# Patient Record
Sex: FEMALE | Race: WHITE | NOT HISPANIC OR LATINO | Employment: PART TIME | URBAN - NONMETROPOLITAN AREA
[De-identification: names, ages, dates, MRNs, and addresses within clinical notes are randomized per-mention and may not be internally consistent; named-entity substitution may affect disease eponyms.]

---

## 2017-01-09 DIAGNOSIS — Z12.31 VISIT FOR SCREENING MAMMOGRAM: Primary | ICD-10-CM

## 2017-02-10 ENCOUNTER — OFFICE VISIT (OUTPATIENT)
Dept: FAMILY MEDICINE | Facility: OTHER | Age: 62
End: 2017-02-10
Attending: PHYSICIAN ASSISTANT
Payer: COMMERCIAL

## 2017-02-10 VITALS
TEMPERATURE: 98.4 F | HEIGHT: 65 IN | SYSTOLIC BLOOD PRESSURE: 108 MMHG | OXYGEN SATURATION: 98 % | DIASTOLIC BLOOD PRESSURE: 66 MMHG | BODY MASS INDEX: 25.66 KG/M2 | WEIGHT: 154 LBS | RESPIRATION RATE: 16 BRPM | HEART RATE: 90 BPM

## 2017-02-10 DIAGNOSIS — N95.2 ATROPHIC VAGINITIS: ICD-10-CM

## 2017-02-10 DIAGNOSIS — Z23 NEED FOR SHINGLES VACCINE: ICD-10-CM

## 2017-02-10 DIAGNOSIS — Z12.31 ENCOUNTER FOR SCREENING MAMMOGRAM FOR BREAST CANCER: ICD-10-CM

## 2017-02-10 DIAGNOSIS — F51.01 PRIMARY INSOMNIA: ICD-10-CM

## 2017-02-10 DIAGNOSIS — Z23 NEED FOR PROPHYLACTIC VACCINATION AND INOCULATION AGAINST INFLUENZA: ICD-10-CM

## 2017-02-10 DIAGNOSIS — Z71.89 ACP (ADVANCE CARE PLANNING): Chronic | ICD-10-CM

## 2017-02-10 DIAGNOSIS — Z01.419 WELL WOMAN EXAM: Primary | ICD-10-CM

## 2017-02-10 PROCEDURE — 90686 IIV4 VACC NO PRSV 0.5 ML IM: CPT | Performed by: PHYSICIAN ASSISTANT

## 2017-02-10 PROCEDURE — 99396 PREV VISIT EST AGE 40-64: CPT | Mod: 25 | Performed by: PHYSICIAN ASSISTANT

## 2017-02-10 PROCEDURE — 90471 IMMUNIZATION ADMIN: CPT | Performed by: PHYSICIAN ASSISTANT

## 2017-02-10 RX ORDER — AMITRIPTYLINE HYDROCHLORIDE 10 MG/1
TABLET ORAL
Qty: 90 TABLET | Refills: 0 | Status: ON HOLD | OUTPATIENT
Start: 2017-02-10 | End: 2017-05-15

## 2017-02-10 RX ORDER — ESTRADIOL 10 UG/1
10 INSERT VAGINAL
Qty: 8 TABLET | Refills: 12 | Status: SHIPPED | OUTPATIENT
Start: 2017-02-13 | End: 2018-09-27

## 2017-02-10 ASSESSMENT — PATIENT HEALTH QUESTIONNAIRE - PHQ9: 5. POOR APPETITE OR OVEREATING: SEVERAL DAYS

## 2017-02-10 ASSESSMENT — ANXIETY QUESTIONNAIRES
7. FEELING AFRAID AS IF SOMETHING AWFUL MIGHT HAPPEN: NOT AT ALL
6. BECOMING EASILY ANNOYED OR IRRITABLE: NOT AT ALL
IF YOU CHECKED OFF ANY PROBLEMS ON THIS QUESTIONNAIRE, HOW DIFFICULT HAVE THESE PROBLEMS MADE IT FOR YOU TO DO YOUR WORK, TAKE CARE OF THINGS AT HOME, OR GET ALONG WITH OTHER PEOPLE: NOT DIFFICULT AT ALL
2. NOT BEING ABLE TO STOP OR CONTROL WORRYING: SEVERAL DAYS
GAD7 TOTAL SCORE: 4
5. BEING SO RESTLESS THAT IT IS HARD TO SIT STILL: NOT AT ALL
1. FEELING NERVOUS, ANXIOUS, OR ON EDGE: SEVERAL DAYS
3. WORRYING TOO MUCH ABOUT DIFFERENT THINGS: SEVERAL DAYS

## 2017-02-10 ASSESSMENT — PAIN SCALES - GENERAL: PAINLEVEL: NO PAIN (0)

## 2017-02-10 NOTE — MR AVS SNAPSHOT
After Visit Summary   2/10/2017    Laney Guzman    MRN: [id number]           Patient Information     Date Of Birth          1955        Visit Information        Provider Department      2/10/2017 2:15 PM Lazara Tate PA [facility] [place]        Today's Diagnoses     Well woman exam    -  1     ACP (advance care planning)         Need for prophylactic vaccination and inoculation against influenza         Primary insomnia         Atrophic vaginitis         Need for shingles vaccine         Encounter for screening mammogram for breast cancer            Follow-ups after your visit        Future tests that were ordered for you today     Open Future Orders        Priority Expected Expires Ordered    Lipid Profile Routine  2/10/2018 2/10/2017    TSH Routine  2/10/2018 2/10/2017            Who to contact     If you have questions or need follow up information about today's clinic visit or your schedule please contact [facility] [place] directly at [phone].  Normal or non-critical lab and imaging results will be communicated to you by MyChart, letter or phone within 4 business days after the clinic has received the results. If you do not hear from us within 7 days, please contact the clinic through [employer] or phone. If you have a critical or abnormal lab result, we will notify you by phone as soon as possible.  Submit refill requests through [employer] or call your pharmacy and they will forward the refill request to us. Please allow 3 business days for your refill to be completed.          Additional Information About Your Visit        MyChart Information     [employer] gives you secure access to your electronic health record. If you see a primary care provider, you can also send messages to your care team and make appointments. If you have questions, please call your primary care clinic.  If you do not have a primary care provider, please call [phone] and they will assist  "you.        Care EveryWhere ID     This is your Care EveryWhere ID. This could be used by other organizations to access your [place] medical records  [id number]        Your Vitals Were     Pulse Temperature Respirations Height BMI (Body Mass Index) Pulse Oximetry    90 98.4  F (36.9  C) (Tympanic) 16 5' 4.5\" (1.638 m) 26.04 kg/m2 98%       Blood Pressure from Last 3 Encounters:   02/10/17 108/66   09/26/16 116/68   02/11/16 117/70    Weight from Last 3 Encounters:   02/10/17 154 lb (69.854 kg)   09/26/16 143 lb (64.864 kg)   02/11/16 142 lb (64.411 kg)              We Performed the Following     HC FLU VAC PRESRV FREE QUAD SPLIT VIR 3+YRS IM     MA Screening Digital Bilateral     Vaccine Administration, Initial [73467]          Today's Medication Changes          These changes are accurate as of: 2/10/17  2:40 PM.  If you have any questions, ask your nurse or doctor.               Start taking these medicines.        Dose/Directions    amitriptyline 10 MG tablet   Commonly known as:  ELAVIL   Used for:  Primary insomnia   Started by:  Lazara Tate PA        One by mouth at bedtime   Quantity:  90 tablet   Refills:  0       zoster vaccine live (PF) injection   Commonly known as:  ZOSTAVAX   Used for:  Need for shingles vaccine   Started by:  Lazara Tate PA        Dose:  1 each   Inject 0.65 mLs Subcutaneous once for 1 dose   Quantity:  0.65 mL   Refills:  0            Where to get your medicines      These medications were sent to [facility]  [address]  [address] [place] [address]     Phone:  [phone]    - amitriptyline 10 MG tablet  - estradiol 10 MCG Tabs vaginal tablet      Some of these will need a paper prescription and others can be bought over the counter.  Ask your nurse if you have questions.     Bring a paper prescription for each of these medications    - zoster vaccine live (PF) injection             Primary Care Provider Office Phone # Fax # "    EZEQUIEL Denson [phone]       [facility] [address]  [place] [address]        Thank you!     [facility]. Our goal is always to provide you with excellent care. Hearing back from our patients is one way we can continue to improve our services. Please take a few minutes to complete the written survey that you may receive in the mail after your visit with us. Thank you!             Your Updated Medication List - Protect others around you: Learn how to safely use, store and throw away your medicines at www.disposemymeds.org.          This list is accurate as of: 2/10/17  2:40 PM.  Always use your most recent med list.                   Brand Name Dispense Instructions for use    amitriptyline 10 MG tablet    ELAVIL    90 tablet    One by mouth at bedtime       CRANBERRY      Take 1 by mouth daily       DAILY MULTIVITAMIN PO      Take 1 tablet by oral route every day with food       diphenoxylate-atropine 2.5-0.025 MG per tablet    LOMOTIL    30 tablet    Take 2 tablets by mouth 4 times daily as needed for diarrhea       estradiol 10 MCG Tabs vaginal tablet   Start taking on:  2/13/2017    VAGIFEM    8 tablet    Place 1 tablet (10 mcg) vaginally twice a week Insert 1 tablet (10mcg) by vaginal route 2 times every week       fish oil-omega-3 fatty acids 1000 MG capsule      Take 1 by oral route every day       ginger root 550 MG Caps capsule      Take 550 mg by mouth daily       IMODIUM A-D PO      Take by mouth as needed       L-FORMULA LYSINE HCL PO      Take 1 tablet by mouth daily       UNABLE TO FIND      Take 1 tablet by mouth daily MEDICATION NAME: tumeric       UNKNOWN TO PATIENT      Eye drops for Glaucoma. Name unknown. 1 drop to each eye daily       VITAMIN D PO      Take 1 tablet by mouth daily       zoster vaccine live (PF) injection    ZOSTAVAX    0.65 mL    Inject 0.65 mLs Subcutaneous once for 1 dose

## 2017-02-11 ASSESSMENT — PATIENT HEALTH QUESTIONNAIRE - PHQ9: SUM OF ALL RESPONSES TO PHQ QUESTIONS 1-9: 0

## 2017-02-11 ASSESSMENT — ANXIETY QUESTIONNAIRES: GAD7 TOTAL SCORE: 4

## 2017-02-13 ENCOUNTER — ALLIED HEALTH/NURSE VISIT (OUTPATIENT)
Dept: FAMILY MEDICINE | Facility: OTHER | Age: 62
End: 2017-02-13
Attending: PHYSICIAN ASSISTANT
Payer: COMMERCIAL

## 2017-02-13 DIAGNOSIS — Z01.419 WELL WOMAN EXAM: ICD-10-CM

## 2017-02-13 DIAGNOSIS — Z23 NEED FOR SHINGLES VACCINE: Primary | ICD-10-CM

## 2017-02-13 LAB
CHOLEST SERPL-MCNC: 216 MG/DL
HDLC SERPL-MCNC: 82 MG/DL
LDLC SERPL CALC-MCNC: 108 MG/DL
NONHDLC SERPL-MCNC: 134 MG/DL
TRIGL SERPL-MCNC: 131 MG/DL
TSH SERPL DL<=0.05 MIU/L-ACNC: 2.61 MU/L (ref 0.4–4)

## 2017-02-13 PROCEDURE — 84443 ASSAY THYROID STIM HORMONE: CPT | Performed by: PHYSICIAN ASSISTANT

## 2017-02-13 PROCEDURE — 80061 LIPID PANEL: CPT | Performed by: PHYSICIAN ASSISTANT

## 2017-02-13 PROCEDURE — 36415 COLL VENOUS BLD VENIPUNCTURE: CPT | Performed by: PHYSICIAN ASSISTANT

## 2017-02-13 PROCEDURE — 90471 IMMUNIZATION ADMIN: CPT

## 2017-02-13 NOTE — NURSING NOTE
The following medication was given:     MEDICATION: Zostavax, patient brings own medication from the pharmacy.  ROUTE: SQ  SITE: Arm - Left  DOSE: 0.5 ml  LOT #: [id number]  :  [employer], [employer], [employer]  EXPIRATION DATE:  11/07/2017  Marely Serrano

## 2017-02-13 NOTE — MR AVS SNAPSHOT
After Visit Summary   2/13/2017    Laney Guzman    MRN: [id number]           Patient Information     Date Of Birth          1955        Visit Information        Provider Department      2/13/2017 3:15 PM HC FP NURSE [facility] [place]        Today's Diagnoses     Need for shingles vaccine    -  1       Follow-ups after your visit        Your next 10 appointments already scheduled     Feb 13, 2017  3:15 PM CST   (Arrive by 3:00 PM)   Nurse Only with HC FP NURSE   [facility] [place] ([facility] [place] [facility])    [address]  [place] [address]   [phone]            Feb 20, 2017  1:00 PM CST   MAMMOGRAM with HC MAMMOGRAM RM   [facility] [place] ([facility] [place] [facility])    [address]  [place] [address]   [phone]           Do not wear any body powder, lotions, deodorant or perfume the day of the exam. Bring a list of all medications, especially hormones.  If your last mammogram was not done at [place], please bring your mammogram films. We will need the name of your MD/PA to send a copy of your report.              Who to contact     If you have questions or need follow up information about today's clinic visit or your schedule please contact [facility] directly at [phone].  Normal or non-critical lab and imaging results will be communicated to you by B[employer], letter or phone within 4 business days after the clinic has received the results. If you do not hear from us within 7 days, please contact the clinic through [employer] or phone. If you have a critical or abnormal lab result, we will notify you by phone as soon as possible.  Submit refill requests through [employer] or call your pharmacy and they will forward the refill request to us. Please allow 3 business days for your refill to be completed.          Additional Information About Your Visit        [employer] Information     [employer] gives you secure access to your electronic health record. If you  see a primary care provider, you can also send messages to your care team and make appointments. If you have questions, please call your primary care clinic.  If you do not have a primary care provider, please call [phone] and they will assist you.        Care EveryWhere ID     This is your Care EveryWhere ID. This could be used by other organizations to access your [place] medical records  [id number]         Blood Pressure from Last 3 Encounters:   02/10/17 108/66   09/26/16 116/68   02/11/16 117/70    Weight from Last 3 Encounters:   02/10/17 154 lb (69.9 kg)   09/26/16 143 lb (64.9 kg)   02/11/16 142 lb (64.4 kg)              We Performed the Following     ADMIN 1st VACCINE        Primary Care Provider Office Phone # Fax #    EZEQUIEL Denson [phone]       [facility] [address] [facility] [address]  [facility] [address]        Thank you!     Thank you for choosing [facility]  for your care. Our goal is always to provide you with excellent care. Hearing back from our patients is one way we can continue to improve our services. Please take a few minutes to complete the written survey that you may receive in the mail after your visit with us. Thank you!             Your Updated Medication List - Protect others around you: Learn how to safely use, store and throw away your medicines at www.disposemymeds.org.          This list is accurate as of: 2/13/17  2:55 PM.  Always use your most recent med list.                   Brand Name Dispense Instructions for use    amitriptyline 10 MG tablet    ELAVIL    90 tablet    One by mouth at bedtime       CRANBERRY      Take 1 by mouth daily       DAILY MULTIVITAMIN PO      Take 1 tablet by oral route every day with food       diphenoxylate-atropine 2.5-0.025 MG per tablet    LOMOTIL    30 tablet    Take 2 tablets by mouth 4 times daily as needed for diarrhea       estradiol 10 MCG Tabs vaginal tablet    VAGIFEM    8 tablet    Place 1  tablet (10 mcg) vaginally twice a week Insert 1 tablet (10mcg) by vaginal route 2 times every week       fish oil-omega-3 fatty acids 1000 MG capsule      Take 1 by oral route every day       ginger root 550 MG Caps capsule      Take 550 mg by mouth daily       IMODIUM A-D PO      Take by mouth as needed       L-FORMULA LYSINE HCL PO      Take 1 tablet by mouth daily       UNABLE TO FIND      Take 1 tablet by mouth daily MEDICATION NAME: tumeric       UNKNOWN TO PATIENT      Eye drops for Glaucoma. Name unknown. 1 drop to each eye daily       VITAMIN D PO      Take 1 tablet by mouth daily

## 2017-02-13 NOTE — PROGRESS NOTES
The following medication was given:     MEDICATION: Zostavax, patient brings own medication from the pharmacy.  ROUTE: SQ  SITE: Arm - Left  DOSE: 0.5 ml  LOT #: [id number]  :  [employer], [employer], [employer]  EXPIRATION DATE:  11/07/2017  Marely Serrano

## 2017-02-22 DIAGNOSIS — Z12.31 VISIT FOR SCREENING MAMMOGRAM: Primary | ICD-10-CM

## 2017-02-22 PROCEDURE — 77063 BREAST TOMOSYNTHESIS BI: CPT | Mod: TC | Performed by: RADIOLOGY

## 2017-02-22 PROCEDURE — G0202 SCR MAMMO BI INCL CAD: HCPCS | Mod: TC | Performed by: RADIOLOGY

## 2017-02-23 ENCOUNTER — TELEPHONE (OUTPATIENT)
Dept: FAMILY MEDICINE | Facility: OTHER | Age: 62
End: 2017-02-23

## 2017-02-23 NOTE — TELEPHONE ENCOUNTER
8:26 AM    Reason for Call: Phone Call    Description: Laney had a mammogram yesterday and was told that tyeeds to get the results from her nurse. There was some type of problem. Please call Laney to advise.    Was an appointment offered for this call?  No    Preferred method for responding to this message: [phone] or [phone]    If we cannot reach you directly, may we leave a detailed response at the number you provided? yes        Martha Fong

## 2017-05-15 ENCOUNTER — ANESTHESIA (OUTPATIENT)
Dept: SURGERY | Facility: HOSPITAL | Age: 62
End: 2017-05-15
Payer: COMMERCIAL

## 2017-05-15 ENCOUNTER — TELEPHONE (OUTPATIENT)
Dept: FAMILY MEDICINE | Facility: OTHER | Age: 62
End: 2017-05-15

## 2017-05-15 ENCOUNTER — ANESTHESIA EVENT (OUTPATIENT)
Dept: SURGERY | Facility: HOSPITAL | Age: 62
End: 2017-05-15
Payer: COMMERCIAL

## 2017-05-15 ENCOUNTER — SURGERY (OUTPATIENT)
Age: 62
End: 2017-05-15

## 2017-05-15 ENCOUNTER — HOSPITAL ENCOUNTER (OUTPATIENT)
Facility: HOSPITAL | Age: 62
Discharge: HOME | End: 2017-05-15
Attending: INTERNAL MEDICINE | Admitting: INTERNAL MEDICINE
Payer: COMMERCIAL

## 2017-05-15 VITALS
OXYGEN SATURATION: 97 % | SYSTOLIC BLOOD PRESSURE: 144 MMHG | DIASTOLIC BLOOD PRESSURE: 96 MMHG | HEART RATE: 112 BPM | HEIGHT: 67 IN | BODY MASS INDEX: 21.5 KG/M2 | WEIGHT: 137 LBS | RESPIRATION RATE: 16 BRPM | TEMPERATURE: 97.5 F

## 2017-05-15 DIAGNOSIS — F51.01 PRIMARY INSOMNIA: ICD-10-CM

## 2017-05-15 PROCEDURE — 37000008 ZZH ANESTHESIA TECHNICAL FEE, 1ST 30 MIN: Performed by: INTERNAL MEDICINE

## 2017-05-15 PROCEDURE — 25000125 ZZHC RX 250: Performed by: NURSE ANESTHETIST, CERTIFIED REGISTERED

## 2017-05-15 PROCEDURE — 40000306 ZZH STATISTIC PRE PROC ASSESS II: Performed by: INTERNAL MEDICINE

## 2017-05-15 PROCEDURE — 27210794 ZZH OR GENERAL SUPPLY STERILE: Performed by: INTERNAL MEDICINE

## 2017-05-15 PROCEDURE — 71000027 ZZH RECOVERY PHASE 2 EACH 15 MINS: Performed by: INTERNAL MEDICINE

## 2017-05-15 PROCEDURE — 45380 COLONOSCOPY AND BIOPSY: CPT | Performed by: ANESTHESIOLOGY

## 2017-05-15 PROCEDURE — 25800025 ZZH RX 258: Performed by: ANESTHESIOLOGY

## 2017-05-15 PROCEDURE — 25000128 H RX IP 250 OP 636: Performed by: NURSE ANESTHETIST, CERTIFIED REGISTERED

## 2017-05-15 PROCEDURE — 36000050 ZZH SURGERY LEVEL 2 1ST 30 MIN: Performed by: INTERNAL MEDICINE

## 2017-05-15 PROCEDURE — 88305 TISSUE EXAM BY PATHOLOGIST: CPT | Mod: TC | Performed by: INTERNAL MEDICINE

## 2017-05-15 PROCEDURE — 01999 UNLISTED ANES PROCEDURE: CPT | Performed by: NURSE ANESTHETIST, CERTIFIED REGISTERED

## 2017-05-15 RX ORDER — SODIUM CHLORIDE, SODIUM LACTATE, POTASSIUM CHLORIDE, CALCIUM CHLORIDE 600; 310; 30; 20 MG/100ML; MG/100ML; MG/100ML; MG/100ML
INJECTION, SOLUTION INTRAVENOUS CONTINUOUS
Status: DISCONTINUED | OUTPATIENT
Start: 2017-05-15 | End: 2017-05-15 | Stop reason: HOSPADM

## 2017-05-15 RX ORDER — MEPERIDINE HYDROCHLORIDE 25 MG/ML
12.5 INJECTION INTRAMUSCULAR; INTRAVENOUS; SUBCUTANEOUS
Status: DISCONTINUED | OUTPATIENT
Start: 2017-05-15 | End: 2017-05-15 | Stop reason: HOSPADM

## 2017-05-15 RX ORDER — PROPOFOL 10 MG/ML
INJECTION, EMULSION INTRAVENOUS PRN
Status: DISCONTINUED | OUTPATIENT
Start: 2017-05-15 | End: 2017-05-15

## 2017-05-15 RX ORDER — FENTANYL CITRATE 50 UG/ML
25-50 INJECTION, SOLUTION INTRAMUSCULAR; INTRAVENOUS
Status: DISCONTINUED | OUTPATIENT
Start: 2017-05-15 | End: 2017-05-15 | Stop reason: HOSPADM

## 2017-05-15 RX ORDER — FENTANYL CITRATE 50 UG/ML
INJECTION, SOLUTION INTRAMUSCULAR; INTRAVENOUS PRN
Status: DISCONTINUED | OUTPATIENT
Start: 2017-05-15 | End: 2017-05-15

## 2017-05-15 RX ORDER — PROMETHAZINE HYDROCHLORIDE 25 MG/ML
12.5 INJECTION, SOLUTION INTRAMUSCULAR; INTRAVENOUS
Status: DISCONTINUED | OUTPATIENT
Start: 2017-05-15 | End: 2017-05-15 | Stop reason: HOSPADM

## 2017-05-15 RX ORDER — LABETALOL HYDROCHLORIDE 5 MG/ML
10 INJECTION, SOLUTION INTRAVENOUS
Status: DISCONTINUED | OUTPATIENT
Start: 2017-05-15 | End: 2017-05-15 | Stop reason: HOSPADM

## 2017-05-15 RX ORDER — KETOROLAC TROMETHAMINE 30 MG/ML
30 INJECTION, SOLUTION INTRAMUSCULAR; INTRAVENOUS EVERY 6 HOURS PRN
Status: DISCONTINUED | OUTPATIENT
Start: 2017-05-15 | End: 2017-05-15 | Stop reason: HOSPADM

## 2017-05-15 RX ORDER — DEXAMETHASONE SODIUM PHOSPHATE 4 MG/ML
4 INJECTION, SOLUTION INTRA-ARTICULAR; INTRALESIONAL; INTRAMUSCULAR; INTRAVENOUS; SOFT TISSUE EVERY 10 MIN PRN
Status: DISCONTINUED | OUTPATIENT
Start: 2017-05-15 | End: 2017-05-15 | Stop reason: HOSPADM

## 2017-05-15 RX ORDER — ONDANSETRON 2 MG/ML
4 INJECTION INTRAMUSCULAR; INTRAVENOUS EVERY 30 MIN PRN
Status: DISCONTINUED | OUTPATIENT
Start: 2017-05-15 | End: 2017-05-15 | Stop reason: HOSPADM

## 2017-05-15 RX ORDER — ONDANSETRON 4 MG/1
4 TABLET, ORALLY DISINTEGRATING ORAL EVERY 30 MIN PRN
Status: DISCONTINUED | OUTPATIENT
Start: 2017-05-15 | End: 2017-05-15 | Stop reason: HOSPADM

## 2017-05-15 RX ORDER — ONDANSETRON 2 MG/ML
4 INJECTION INTRAMUSCULAR; INTRAVENOUS
Status: DISCONTINUED | OUTPATIENT
Start: 2017-05-15 | End: 2017-05-15 | Stop reason: HOSPADM

## 2017-05-15 RX ORDER — NALOXONE HYDROCHLORIDE 0.4 MG/ML
.1-.4 INJECTION, SOLUTION INTRAMUSCULAR; INTRAVENOUS; SUBCUTANEOUS
Status: DISCONTINUED | OUTPATIENT
Start: 2017-05-15 | End: 2017-05-15 | Stop reason: HOSPADM

## 2017-05-15 RX ORDER — ALBUTEROL SULFATE 0.83 MG/ML
2.5 SOLUTION RESPIRATORY (INHALATION) EVERY 4 HOURS PRN
Status: DISCONTINUED | OUTPATIENT
Start: 2017-05-15 | End: 2017-05-15 | Stop reason: HOSPADM

## 2017-05-15 RX ORDER — HYDRALAZINE HYDROCHLORIDE 20 MG/ML
2.5-5 INJECTION INTRAMUSCULAR; INTRAVENOUS EVERY 10 MIN PRN
Status: DISCONTINUED | OUTPATIENT
Start: 2017-05-15 | End: 2017-05-15 | Stop reason: HOSPADM

## 2017-05-15 RX ORDER — LIDOCAINE 40 MG/G
CREAM TOPICAL
Status: DISCONTINUED | OUTPATIENT
Start: 2017-05-15 | End: 2017-05-15 | Stop reason: HOSPADM

## 2017-05-15 RX ADMIN — SODIUM CHLORIDE, POTASSIUM CHLORIDE, SODIUM LACTATE AND CALCIUM CHLORIDE: 600; 310; 30; 20 INJECTION, SOLUTION INTRAVENOUS at 09:40

## 2017-05-15 RX ADMIN — FENTANYL CITRATE 50 MCG: 50 INJECTION, SOLUTION INTRAMUSCULAR; INTRAVENOUS at 10:01

## 2017-05-15 RX ADMIN — PROPOFOL 20 MG: 10 INJECTION, EMULSION INTRAVENOUS at 10:14

## 2017-05-15 RX ADMIN — FENTANYL CITRATE 50 MCG: 50 INJECTION, SOLUTION INTRAMUSCULAR; INTRAVENOUS at 09:57

## 2017-05-15 RX ADMIN — MIDAZOLAM HYDROCHLORIDE 1 MG: 1 INJECTION, SOLUTION INTRAMUSCULAR; INTRAVENOUS at 10:01

## 2017-05-15 RX ADMIN — PROPOFOL 20 MG: 10 INJECTION, EMULSION INTRAVENOUS at 10:08

## 2017-05-15 RX ADMIN — PROPOFOL 20 MG: 10 INJECTION, EMULSION INTRAVENOUS at 10:11

## 2017-05-15 RX ADMIN — PROPOFOL 30 MG: 10 INJECTION, EMULSION INTRAVENOUS at 10:05

## 2017-05-15 RX ADMIN — MIDAZOLAM HYDROCHLORIDE 1 MG: 1 INJECTION, SOLUTION INTRAMUSCULAR; INTRAVENOUS at 09:57

## 2017-05-15 RX ADMIN — PROPOFOL 50 MG: 10 INJECTION, EMULSION INTRAVENOUS at 10:02

## 2017-05-15 NOTE — ANESTHESIA CARE TRANSFER NOTE
Patient: Laney Guzman    Procedure(s):  COLONOSCOPY WITH BIOPSIES - Wound Class: II-Clean Contaminated    Diagnosis: DIARRHEA, ABD PAIN  Diagnosis Additional Information: No value filed.    Anesthesia Type:   MAC     Note:  Airway :Nasal Cannula  Patient transferred to:Phase II        Vitals: (Last set prior to Anesthesia Care Transfer)    CRNA VITALS  5/15/2017 0952 - 5/15/2017 1024      5/15/2017             Resp Rate (set): 8                Electronically Signed By: KEO David CRNA  May 15, 2017  10:24 AM

## 2017-05-15 NOTE — OP NOTE
Brief Operative Note - Endoscopy    Pre-operative diagnosis: DIARRHEA, ABD PAIN   Post-operative diagnosis Diverticulosis coli   Procedure: Procedure(s):  COLONOSCOPY - Wound Class: II-Clean Contaminated   Surgeon: David Vieira MD, MD   Assistants(s): None   Estimated blood loss: Minimal    Specimens: Random biopsies for history of diarrhea   Findings: See dictation.

## 2017-05-15 NOTE — ANESTHESIA POSTPROCEDURE EVALUATION
Patient: Laney Guzman    Procedure(s):  COLONOSCOPY WITH BIOPSIES - Wound Class: II-Clean Contaminated    Diagnosis:DIARRHEA, ABD PAIN  Diagnosis Additional Information: No value filed.    Anesthesia Type:  MAC    Note:  Anesthesia Post Evaluation    Patient location during evaluation: Phase 2 and Bedside  Patient participation: Able to fully participate in evaluation  Level of consciousness: awake and alert  Pain management: adequate  Airway patency: patent  Cardiovascular status: acceptable  Respiratory status: acceptable  Hydration status: stable  PONV: none     Anesthetic complications: None          Last vitals:  Vitals:    05/15/17 0932 05/15/17 1025 05/15/17 1045   BP: 144/96     Pulse: 112     Resp: 18 16 16   Temp: 97.5  F (36.4  C)     SpO2: 97%           Electronically Signed By: Rivrea Ty MD  May 15, 2017  11:48 AM

## 2017-05-15 NOTE — IP AVS SNAPSHOT
[facility] Preop/Phase II    [address] [address]    Phone:  [phone]                                       After Visit Summary   5/15/2017    Laney Guzman    MRN: [id number]           After Visit Summary Signature Page     I have received my discharge instructions, and my questions have been answered. I have discussed any challenges I see with this plan with the nurse or doctor.    ..........................................................................................................................................  Patient/Patient Representative Signature      ..........................................................................................................................................  Patient Representative Print Name and Relationship to Patient    ..................................................               ................................................  Date                                            Time    ..........................................................................................................................................  Reviewed by Signature/Title    ...................................................              ..............................................  Date                                                            Time

## 2017-05-15 NOTE — TELEPHONE ENCOUNTER
Reason for call:  RESULTS    1. What is the test that was ordered? Celiacs disease  2. Who ordered your test? Lazara Tate  3. When was the test performed?  Oct 2016  Description: Pt states she has test for celiacs disease in Oct 2016 and would like call back. She states she doesn't remember getting the results.  Was an appointment offered for this a call? No  Preferred method for responding to this message: Telephone Call - can call cell [phone] or home [phone]  If we cannot reach you directly, may we leave a detailed response at the number you provided? Yes  Can this message wait until your PCP/Provider returns if not available today? N/a provider is in today

## 2017-05-15 NOTE — ANESTHESIA PREPROCEDURE EVALUATION
Anesthesia Evaluation     . Pt has had prior anesthetic.     No history of anesthetic complications          ROS/MED HX    ENT/Pulmonary:  - neg pulmonary ROS     Neurologic:     (+)other neuro Glaucoma    Cardiovascular:  - neg cardiovascular ROS       METS/Exercise Tolerance:     Hematologic:  - neg hematologic  ROS       Musculoskeletal:  - neg musculoskeletal ROS       GI/Hepatic:     (+) bowel prep,       Renal/Genitourinary:  - ROS Renal section negative       Endo:  - neg endo ROS       Psychiatric:  - neg psychiatric ROS       Infectious Disease:  - neg infectious disease ROS       Malignancy:      - no malignancy   Other:                     Physical Exam  Normal systems: cardiovascular and pulmonary    Airway   Mallampati: II  TM distance: <3 FB  Neck ROM: full    Dental   Comment: Crowns, Bridgework    Cardiovascular   Rhythm and rate: regular and normal      Pulmonary    breath sounds clear to auscultation                    Anesthesia Plan      History & Physical Review  History and physical reviewed and following examination; no interval change.    ASA Status:  2 .    NPO Status:  > 8 hours    Plan for MAC with Intravenous and Propofol induction. Maintenance will be TIVA.    PONV prophylaxis:  Ondansetron (or other 5HT-3)       Postoperative Care  Postoperative pain management:  Multi-modal analgesia.      Consents  Anesthetic plan, risks, benefits and alternatives discussed with:  Patient..                          .

## 2017-05-15 NOTE — OR NURSING
Patient and responsible adult given discharge instructions with no questions regarding instructions. Marj score 20. Pain level 0/10.  Discharged from unit via amb. Patient discharged to home.

## 2017-05-15 NOTE — CONSULTS
Pre-Endoscopy History and Physical     Laney Guzman MRN# [id number]   YOB: 1955 Age: 62 year old     Date of Procedure: (Not on file)  Primary care provider: Lazara Tate  Type of Endoscopy: Colonoscopy with possible biopsy, possible polypectomy  Reason for Procedure: Diarrhea and abdominal pain  Type of Anesthesia Anticipated: MAC    HPI:    Laney is a 62 year old female who will be undergoing the above procedure.      A history and physical has been performed. The patient's medications and allergies have been reviewed. The risks and benefits of the procedure and the sedation options and risks were discussed with the patient.  All questions were answered and informed consent was obtained.      She denies a personal or family history of anesthesia complications or bleeding disorders.     Patient Active Problem List   Diagnosis     ACP (advance care planning)        Past Medical History:   Diagnosis Date     Glaucoma      Menopausal or female climacteric states 08/21/2000     Routine general medical examination at [facility] 08/18/2000        Past Surgical History:   Procedure Laterality Date     COLONOSCOPY  3-     excision of ganglion cyst       phlebectomies x11 R leg,x12 L leg      Bilateral, varicose veins       Social History   Substance Use Topics     Smoking status: Never Smoker     Smokeless tobacco: Not on file      Comment: no passive exposure     Alcohol use Yes      Comment: socially       Family History   Problem Relation Age of Onset     CANCER Father      Other - See Comments Father 82     Emphysema, cause of death     Arthritis Mother 62     Rheumatoid Arthritis, cause of death     Other - See Comments Sister      Stomach aneurysm       Prior to Admission medications    Medication Sig Start Date End Date Taking? Authorizing Provider   Loperamide HCl (IMODIUM A-D PO) Take by mouth as needed    Reported, Patient   amitriptyline (ELAVIL) 10 MG tablet One by mouth at  "bedtime 2/10/17   Lazara Tate PA   estradiol (VAGIFEM) 10 MCG TABS vaginal tablet Place 1 tablet (10 mcg) vaginally twice a week Insert 1 tablet (10mcg) by vaginal route 2 times every week 2/13/17   Lazara Tate PA   diphenoxylate-atropine (LOMOTIL) 2.5-0.025 MG per tablet Take 2 tablets by mouth 4 times daily as needed for diarrhea 10/19/16   Lazara Tate PA   L-FORMULA LYSINE HCL PO Take 1 tablet by mouth daily    Reported, Patient   Ginger, Zingiber officinalis, (GINGER ROOT) 550 MG CAPS Take 550 mg by mouth daily    Reported, Patient   UNABLE TO FIND Take 1 tablet by mouth daily MEDICATION NAME: tumeric    Reported, Patient   UNKNOWN TO PATIENT Eye drops for Glaucoma. Name unknown. 1 drop to each eye daily    Reported, Patient   Cholecalciferol (VITAMIN D PO) Take 1 tablet by mouth daily    Reported, Patient   CRANBERRY Take 1 by mouth daily    Reported, Patient   fish oil-omega-3 fatty acids (FISH OIL) 1000 MG capsule Take 1 by oral route every day    Reported, Patient   Multiple Vitamin (DAILY MULTIVITAMIN PO) Take 1 tablet by oral route every day with food    Reported, Patient       Allergies   Allergen Reactions     Augmentin Cramps        REVIEW OF SYSTEMS:   5 point ROS negative except as noted above in HPI, including Gen., Resp., CV, GI &  system review.    PHYSICAL EXAM:   There were no vitals taken for this visit. Estimated body mass index is 26.03 kg/(m^2) as calculated from the following:    Height as of 2/10/17: 1.638 m (5' 4.5\").    Weight as of 2/10/17: 69.9 kg (154 lb).   GENERAL APPEARANCE: alert, and oriented  MENTAL STATUS: alert  AIRWAY EXAM: Mallampatti Class II (visualization of the soft palate, fauces, and uvula)  RESP: lungs clear to auscultation - no rales, rhonchi or wheezes  CV: regular rates and rhythm  DIAGNOSTICS:    Not indicated    IMPRESSION   ASA Class 2 - Mild systemic disease    PLAN:   Plan for Colonoscopy with possible biopsy, possible polypectomy. We " discussed the risks, benefits and alternatives and the patient wished to proceed.    The above has been forwarded to the consulting provider.      Signed Electronically by: David Vieira MD  May 15, 2017

## 2017-05-15 NOTE — PROCEDURES
DATE OF SERVICE:  05/15/2017      PROCEDURE:  Colonoscopy.      PREOPERATIVE DIAGNOSIS:  Chronic diarrhea.      POSTOPERATIVE DIAGNOSIS:  Essentially normal colonoscopy with diverticulosis coli scattered throughout the colon.      ANESTHESIA:  See anesthesia notes for details of monitored anesthesia care.      DESCRIPTION OF PROCEDURE:  After informed consent was obtained from the patient, he was brought to the operative suite and placed in the left lateral decubitus position.  All appropriate cardiopulmonary monitoring was put in place.  After adequate sedation with propofol, careful digital rectal exam was performed which was normal.  After this an Olympus GIF-190 colonoscope was introduced through the anorectum and advanced to the level of the terminal ileum.  Slow careful withdrawal was performed.  The terminal ileal mucosa is normal.  Cecum is normal.  The colon through the rectum is normal.  Patient has scattered diverticulosis coli present.  No polyps were seen on today's exam.  Random colon biopsies were taken to rule out microscopic colitis.      FINAL DIAGNOSIS:  Normal colonoscopy.      RECOMMENDATIONS:  The patient currently on amitriptyline.  We will increase to 25 mg at bedtime from 10 mg.  Consider if biopsies negative, treatment for IBS/D.  Possibilities include viberzi, cholestyramine, etc.  Follow up in the office in 2-4 weeks.         DANIEL CARVER MD             D: 05/15/2017 10:24   T: 05/15/2017 10:40   MT: SK      Name:     DEEPTI LIVINGSTON   MRN:      [id number]        Account:        [id number]   :      1955           Procedure Date: 05/15/2017      Document: [id number]

## 2017-05-15 NOTE — DISCHARGE INSTRUCTIONS
INSTRUCTIONS AFTER COLONOSCOPY    WHEN YOU ARE BACK HOME:    Plan to rest for an hour or two after you get home.    You may have some cramping or pressure until you pass gas.    You may resume your regular medications.    Eat a small, light meal at first, and then gradually return to normal meal sizes.  If you had a polyp removed:    Slight bleeding may occur.  You may have a slight blood stain on the toilet paper after a bowel movement.    To lessen the chance of bleeding, avoid heavy exercise for ONE WEEK.  This includes heavy lifting, vigorous sport activities, and heavy physical labor.  You may resume your normal sexual activity.      Avoid aspirin or aspirin products if instructed by your doctor.    WHAT TO WATCH FOR:  Problems rarely occur after the exam; however, it is important for you to watch for early signs of possible problems.  If you have     Unusual pain in your abdomen    Nausea and vomiting that persists    Excessive bleeding    Black or bloody bowel movements    Fever or temperature above 100.6 F  Please call your doctor ([facility] [phone]) or go to the nearest hospital emergency room.    Post-Anesthesia Patient Instructions    IMMEDIATELY FOLLOWING SURGERY:  Do not drive or operate machinery for the first twenty four hours after surgery.  Do not make any important decisions for twenty four hours after surgery or while taking narcotic pain medications or sedatives.  If you develop intractable nausea and vomiting or a severe headache please notify your doctor immediately.    FOLLOW-UP:  Please make an appointment with your surgeon as instructed. You do not need to follow up with anesthesia unless specifically instructed to do so.    WOUND CARE INSTRUCTIONS (if applicable):  Keep a dry clean dressing on the anesthesia/puncture wound site if there is drainage.  Once the wound has quit draining you may leave it open to air.  Generally you should leave the bandage intact for twenty four  hours unless there is drainage.  If the epidural site drains for more than 36-48 hours please call the anesthesia department.    QUESTIONS?:  Please feel free to call your physician or the hospital  if you have any questions, and they will be happy to assist you.

## 2017-05-16 LAB — COPATH REPORT: NORMAL

## 2017-05-26 ENCOUNTER — TELEPHONE (OUTPATIENT)
Dept: FAMILY MEDICINE | Facility: OTHER | Age: 62
End: 2017-05-26

## 2017-05-26 DIAGNOSIS — B37.31 CANDIDIASIS OF VULVA AND VAGINA: Primary | ICD-10-CM

## 2017-05-26 RX ORDER — FLUCONAZOLE 150 MG/1
150 TABLET ORAL
Qty: 4 TABLET | Refills: 0 | Status: SHIPPED | OUTPATIENT
Start: 2017-05-26 | End: 2018-03-12

## 2017-05-26 NOTE — TELEPHONE ENCOUNTER
12:55 PM    Reason for Call: Phone Call    Description: Juve is now taking 25 mg of Amitriptyline instead of 10 mg.  Laney has been getting a yeast infection on the 25 mg dosage. Could the increase been causing this and is there a pill she can take for the yeast infection. Please call Laney to advise    Was an appointment offered for this call?  No    Preferred method for responding to this message: [phone]    If we cannot reach you directly, may we leave a detailed response at the number you provided?   Yes    Martha Fong

## 2018-01-28 ENCOUNTER — HEALTH MAINTENANCE LETTER (OUTPATIENT)
Age: 63
End: 2018-01-28

## 2018-02-02 ENCOUNTER — OFFICE VISIT (OUTPATIENT)
Dept: FAMILY MEDICINE | Facility: OTHER | Age: 63
End: 2018-02-02
Attending: PHYSICIAN ASSISTANT
Payer: COMMERCIAL

## 2018-02-02 VITALS
TEMPERATURE: 98.8 F | HEART RATE: 99 BPM | SYSTOLIC BLOOD PRESSURE: 128 MMHG | DIASTOLIC BLOOD PRESSURE: 84 MMHG | BODY MASS INDEX: 24.59 KG/M2 | WEIGHT: 157 LBS | OXYGEN SATURATION: 99 %

## 2018-02-02 DIAGNOSIS — J01.00 ACUTE MAXILLARY SINUSITIS, RECURRENCE NOT SPECIFIED: Primary | ICD-10-CM

## 2018-02-02 PROCEDURE — 99213 OFFICE O/P EST LOW 20 MIN: CPT | Performed by: PHYSICIAN ASSISTANT

## 2018-02-02 RX ORDER — CEFPROZIL 500 MG/1
500 TABLET, FILM COATED ORAL 2 TIMES DAILY
Qty: 28 TABLET | Refills: 0 | Status: SHIPPED | OUTPATIENT
Start: 2018-02-02 | End: 2018-02-16

## 2018-02-02 ASSESSMENT — ANXIETY QUESTIONNAIRES
4. TROUBLE RELAXING: NOT AT ALL
6. BECOMING EASILY ANNOYED OR IRRITABLE: NOT AT ALL
1. FEELING NERVOUS, ANXIOUS, OR ON EDGE: NOT AT ALL
GAD7 TOTAL SCORE: 0
3. WORRYING TOO MUCH ABOUT DIFFERENT THINGS: NOT AT ALL
7. FEELING AFRAID AS IF SOMETHING AWFUL MIGHT HAPPEN: NOT AT ALL
5. BEING SO RESTLESS THAT IT IS HARD TO SIT STILL: NOT AT ALL
2. NOT BEING ABLE TO STOP OR CONTROL WORRYING: NOT AT ALL

## 2018-02-02 ASSESSMENT — PAIN SCALES - GENERAL: PAINLEVEL: SEVERE PAIN (6)

## 2018-02-02 NOTE — NURSING NOTE
"Chief Complaint   Patient presents with     Sinus Problem     cough and congestion. started out 8 days ago with slight fever and body aches. has not had a fever since saturday. just facial pressure and cough and congestion now.       Initial /84  Pulse 99  Temp 98.8  F (37.1  C) (Tympanic)  Wt 157 lb (71.2 kg)  SpO2 99%  BMI 24.59 kg/m2 Estimated body mass index is 24.59 kg/(m^2) as calculated from the following:    Height as of 5/15/17: 5' 7\" (1.702 m).    Weight as of this encounter: 157 lb (71.2 kg).  Medication Reconciliation: complete     Kelly Alvarez  "

## 2018-02-02 NOTE — PROGRESS NOTES
Chief complaint:   Chief Complaint   Patient presents with     Sinus Problem     cough and congestion. started out 8 days ago with slight fever and body aches. has not had a fever since saturday. just facial pressure and cough and congestion now.       Subjective: Laney Guzman is a 62 year old female with a 8 day history of nasal congestion, PND, sore throat, earache, cough, fever. Denies nausea, vomiting positive for diarrhea    Past Medical History:   Diagnosis Date     Glaucoma      Menopausal or female climacteric states 08/21/2000     Routine general medical examination at [facility] 08/18/2000     Past Surgical History:   Procedure Laterality Date     COLONOSCOPY  3-     COLONOSCOPY N/A 5/15/2017    Procedure: COLONOSCOPY;  COLONOSCOPY WITH BIOPSIES;  Surgeon: David Vieira MD;  Location: [facility]     excision of ganglion cyst       phlebectomies x11 R leg,x12 L leg      Bilateral, varicose veins     Current Outpatient Prescriptions   Medication Sig Dispense Refill     fluconazole (DIFLUCAN) 150 MG tablet Take 1 tablet (150 mg) by mouth every 3 days 4 tablet 0     amitriptyline (ELAVIL) 25 MG tablet One by mouth at bedtime 90 tablet 1     Loperamide HCl (IMODIUM A-D PO) Take by mouth as needed       estradiol (VAGIFEM) 10 MCG TABS vaginal tablet Place 1 tablet (10 mcg) vaginally twice a week Insert 1 tablet (10mcg) by vaginal route 2 times every week 8 tablet 12     diphenoxylate-atropine (LOMOTIL) 2.5-0.025 MG per tablet Take 2 tablets by mouth 4 times daily as needed for diarrhea 30 tablet 0     L-FORMULA LYSINE HCL PO Take 1 tablet by mouth daily       Ginger, Zingiber officinalis, (GINGER ROOT) 550 MG CAPS Take 550 mg by mouth daily       UNABLE TO FIND Take 1 tablet by mouth daily MEDICATION NAME: tumeric       UNKNOWN TO PATIENT Eye drops for Glaucoma. Name unknown. 1 drop to each eye daily       Cholecalciferol (VITAMIN D PO) Take 1 tablet by mouth daily       CRANBERRY Take 1 by mouth  daily       fish oil-omega-3 fatty acids (FISH OIL) 1000 MG capsule Take 1 by oral route every day       Multiple Vitamin (DAILY MULTIVITAMIN PO) Take 1 tablet by oral route every day with food        Allergies   Allergen Reactions     Augmentin Cramps       Family and Social History are reviewed.    Review Of Systems  Skin: Denies rash  Eyes: Denies redness or discharge   Ears/Nose/Throat: as above  Respiratory: as above  Cardiovascular: Denies chest pain or palpitations   Gastrointestinal:Denies nausea, vomiting, diarrhea   Musculoskeletal: No myalgias    Objective:   B/P: 128/84, T: 98.8, P: 99, R: Data Unavailable    Physical Exam  General: Alert orientated. NAD  Skin is unremarkable.  HEENT:Posterior pharynx erythematous. EAC's clear. TM's intact. Nasal mucosa edematous, erythematous. TTP maxillary sinuses. Neck supple. No lymphadenopathy  Lungs: Clear to auscultation  Cardiac: RRR    Assessment:   (J01.00) Acute maxillary sinusitis, recurrence not specified  (primary encounter diagnosis)  Plan: cefPROZIL (CEFZIL) 500 MG tablet              Rest. Increase fluids. Tylenol for fever or discomfort. Return if symptoms persist or worsen.    Kimberly Forrester PA-C

## 2018-02-02 NOTE — MR AVS SNAPSHOT
After Visit Summary   2/2/2018    Laney Guzman    MRN: [id number]           Patient Information     Date Of Birth          1955        Visit Information        Provider Department      2/2/2018 2:15 PM Kimberly Forrester PA [facility]        Today's Diagnoses     Acute maxillary sinusitis, recurrence not specified    -  1       Follow-ups after your visit        Who to contact     If you have questions or need follow up information about today's clinic visit or your schedule please contact [facility] directly at [phone].  Normal or non-critical lab and imaging results will be communicated to you by [employer] Outdoor[employer], letter or phone within 4 business days after the clinic has received the results. If you do not hear from us within 7 days, please contact the clinic through [employer]door[employer] or phone. If you have a critical or abnormal lab result, we will notify you by phone as soon as possible.  Submit refill requests through MymCart or call your pharmacy and they will forward the refill request to us. Please allow 3 business days for your refill to be completed.          Additional Information About Your Visit        MyChart Information     M[employer] gives you secure access to your electronic health record. If you see a primary care provider, you can also send messages to your care team and make appointments. If you have questions, please call your primary care clinic.  If you do not have a primary care provider, please call [phone] and they will assist you.        Care EveryWhere ID     This is your Care EveryWhere ID. This could be used by other organizations to access your [place] medical records  [id number]        Your Vitals Were     Pulse Temperature Pulse Oximetry BMI (Body Mass Index)          99 98.8  F (37.1  C) (Tympanic) 99% 24.59 kg/m2         Blood Pressure from Last 3 Encounters:   02/02/18 128/84   05/15/17 144/96   02/10/17 108/66    Weight from Last 3  Encounters:   02/02/18 157 lb (71.2 kg)   05/15/17 137 lb (62.1 kg)   02/10/17 154 lb (69.9 kg)              Today, you had the following     No orders found for display         Today's Medication Changes          These changes are accurate as of 2/2/18  2:23 PM.  If you have any questions, ask your nurse or doctor.               Start taking these medicines.        Dose/Directions    cefPROZIL 500 MG tablet   Commonly known as:  CEFZIL   Used for:  Acute maxillary sinusitis, recurrence not specified   Started by:  Kimberly Forrester PA        Dose:  500 mg   Take 1 tablet (500 mg) by mouth 2 times daily for 14 days   Quantity:  28 tablet   Refills:  0            Where to get your medicines      These medications were sent to [facility] - [facility] - [address]  [address] [place] [address]     Phone:  [phone]     cefPROZIL 500 MG tablet                Primary Care Provider Office Phone # Fax #    EZEQUIEL Denson [phone]       [facility] [address]  [place] [address]        Equal Access to Services     [facility] HospitalSUHAIL AH: Hadii aad ku hadasho Soomaali, waaxda luqadaha, qaybta kaalmada adeegyada, waxay idiin hayaan adesoto franklinarakaycee ramirez . So [facility] [phone].    ATENCIÓN: Si habla español, tiene a delacruz disposición servicios gratuitos de asistencia lingüística. Llame al [phone].    We comply with applicable federal civil rights laws and [place] laws. We do not discriminate on the basis of race, color, national origin, age, disability, sex, sexual orientation, or gender identity.            Thank you!     Thank you for choosing [facility]  for your care. Our goal is always to provide you with excellent care. Hearing back from our patients is one way we can continue to improve our services. Please take a few minutes to complete the written survey that you may receive in the mail after your visit with us. Thank you!              Your Updated Medication List - Protect others around you: Learn how to safely use, store and throw away your medicines at www.disposemymeds.org.          This list is accurate as of 2/2/18  2:23 PM.  Always use your most recent med list.                   Brand Name Dispense Instructions for use Diagnosis    amitriptyline 25 MG tablet    ELAVIL    90 tablet    One by mouth at bedtime    Primary insomnia       cefPROZIL 500 MG tablet    CEFZIL    28 tablet    Take 1 tablet (500 mg) by mouth 2 times daily for 14 days    Acute maxillary sinusitis, recurrence not specified       CRANBERRY      Take 1 by mouth daily        DAILY MULTIVITAMIN PO      Take 1 tablet by oral route every day with food        diphenoxylate-atropine 2.5-0.025 MG per tablet    LOMOTIL    30 tablet    Take 2 tablets by mouth 4 times daily as needed for diarrhea    Functional diarrhea       estradiol 10 MCG Tabs vaginal tablet    VAGIFEM    8 tablet    Place 1 tablet (10 mcg) vaginally twice a week Insert 1 tablet (10mcg) by vaginal route 2 times every week    Atrophic vaginitis       fish oil-omega-3 fatty acids 1000 MG capsule      Take 1 by oral route every day        fluconazole 150 MG tablet    DIFLUCAN    4 tablet    Take 1 tablet (150 mg) by mouth every 3 days    Candidiasis of vulva and vagina       ginger root 550 MG Caps capsule      Take 550 mg by mouth daily        IMODIUM A-D PO      Take by mouth as needed        L-FORMULA LYSINE HCL PO      Take 1 tablet by mouth daily        UNABLE TO FIND      Take 1 tablet by mouth daily MEDICATION NAME: tumeric        UNKNOWN TO PATIENT      Eye drops for Glaucoma. Name unknown. 1 drop to each eye daily        VITAMIN D PO      Take 1 tablet by mouth daily    Routine gynecological examination

## 2018-02-03 ASSESSMENT — ANXIETY QUESTIONNAIRES: GAD7 TOTAL SCORE: 0

## 2018-02-03 ASSESSMENT — PATIENT HEALTH QUESTIONNAIRE - PHQ9: SUM OF ALL RESPONSES TO PHQ QUESTIONS 1-9: 1

## 2018-03-12 ENCOUNTER — OFFICE VISIT (OUTPATIENT)
Dept: FAMILY MEDICINE | Facility: OTHER | Age: 63
End: 2018-03-12
Attending: PHYSICIAN ASSISTANT
Payer: COMMERCIAL

## 2018-03-12 VITALS
DIASTOLIC BLOOD PRESSURE: 64 MMHG | HEIGHT: 62 IN | BODY MASS INDEX: 29.3 KG/M2 | WEIGHT: 159.2 LBS | TEMPERATURE: 98.5 F | SYSTOLIC BLOOD PRESSURE: 110 MMHG | HEART RATE: 87 BPM | OXYGEN SATURATION: 98 %

## 2018-03-12 DIAGNOSIS — H40.89 OTHER GLAUCOMA OF BOTH EYES: ICD-10-CM

## 2018-03-12 DIAGNOSIS — Z01.419 WELL WOMAN EXAM WITH ROUTINE GYNECOLOGICAL EXAM: Primary | ICD-10-CM

## 2018-03-12 DIAGNOSIS — Z11.59 ENCOUNTER FOR HEPATITIS C SCREENING TEST FOR LOW RISK PATIENT: ICD-10-CM

## 2018-03-12 DIAGNOSIS — Z12.31 ENCOUNTER FOR SCREENING MAMMOGRAM FOR BREAST CANCER: ICD-10-CM

## 2018-03-12 DIAGNOSIS — Z78.0 ASYMPTOMATIC POSTMENOPAUSAL ESTROGEN DEFICIENCY: ICD-10-CM

## 2018-03-12 PROBLEM — K58.0 IRRITABLE BOWEL SYNDROME WITH DIARRHEA: Status: ACTIVE | Noted: 2018-03-12

## 2018-03-12 PROCEDURE — G0123 SCREEN CERV/VAG THIN LAYER: HCPCS | Performed by: PHYSICIAN ASSISTANT

## 2018-03-12 PROCEDURE — 99396 PREV VISIT EST AGE 40-64: CPT | Performed by: PHYSICIAN ASSISTANT

## 2018-03-12 PROCEDURE — 99000 SPECIMEN HANDLING OFFICE-LAB: CPT | Performed by: PHYSICIAN ASSISTANT

## 2018-03-12 PROCEDURE — 87624 HPV HI-RISK TYP POOLED RSLT: CPT | Mod: 90 | Performed by: PHYSICIAN ASSISTANT

## 2018-03-12 ASSESSMENT — ANXIETY QUESTIONNAIRES
7. FEELING AFRAID AS IF SOMETHING AWFUL MIGHT HAPPEN: NOT AT ALL
1. FEELING NERVOUS, ANXIOUS, OR ON EDGE: NOT AT ALL
3. WORRYING TOO MUCH ABOUT DIFFERENT THINGS: NOT AT ALL
GAD7 TOTAL SCORE: 0
5. BEING SO RESTLESS THAT IT IS HARD TO SIT STILL: NOT AT ALL
6. BECOMING EASILY ANNOYED OR IRRITABLE: NOT AT ALL
2. NOT BEING ABLE TO STOP OR CONTROL WORRYING: NOT AT ALL
4. TROUBLE RELAXING: NOT AT ALL

## 2018-03-12 ASSESSMENT — PAIN SCALES - GENERAL: PAINLEVEL: NO PAIN (0)

## 2018-03-12 NOTE — PROGRESS NOTES
SUBJECTIVE:   CC: Laney Guzman is an 62 year old woman who presents for preventive health visit.     Healthy Habits:    Do you get at least three servings of calcium containing foods daily (dairy, green leafy vegetables, etc.)? No Taking a Vit D Supplement    Amount of exercise or daily activities, outside of work: An hour and a half doing walking 6 out of 7 days a week    Problems taking medications regularly No    Medication side effects: No    Have you had an eye exam in the past two years? yes    Do you see a dentist twice per year? yes    Do you have sleep apnea, excessive snoring or daytime drowsiness?no          Today's PHQ-2 Score:   PHQ-2 ( 1999 Pfizer) 11/21/2013   Q1: Little interest or pleasure in doing things 0   Q2: Feeling down, depressed or hopeless 0   PHQ-2 Score 0       Abuse: Current or Past(Physical, Sexual or Emotional)- No  Do you feel safe in your environment - Yes    Social History   Substance Use Topics     Smoking status: Never Smoker     Smokeless tobacco: Never Used      Comment: no passive exposure     Alcohol use Yes      Comment: socially     If you drink alcohol do you typically have >3 drinks per day or >7 drinks per week? Yes - AUDIT SCORE:     No flowsheet data found. Social drinker-wine                     Reviewed orders with patient.  Reviewed health maintenance and updated orders accordingly - Yes  Labs reviewed in EPIC  BP Readings from Last 3 Encounters:   03/12/18 110/64   02/02/18 128/84   05/15/17 144/96    Wt Readings from Last 3 Encounters:   03/12/18 159 lb 3.2 oz (72.2 kg)   02/02/18 157 lb (71.2 kg)   05/15/17 137 lb (62.1 kg)                  Patient Active Problem List   Diagnosis     ACP (advance care planning)     Other specified glaucoma     Irritable bowel syndrome with diarrhea     Past Surgical History:   Procedure Laterality Date     COLONOSCOPY  3-     COLONOSCOPY N/A 5/15/2017    Procedure: COLONOSCOPY;  COLONOSCOPY WITH BIOPSIES;   Surgeon: David Vieira MD;  Location: [facility]     excision of ganglion cyst       phlebectomies x11 R leg,x12 L leg      Bilateral, varicose veins       Social History   Substance Use Topics     Smoking status: Never Smoker     Smokeless tobacco: Never Used      Comment: no passive exposure     Alcohol use Yes      Comment: socially     Family History   Problem Relation Age of Onset     CANCER Father      Other - See Comments Father 82     Emphysema, cause of death     Arthritis Mother 62     Rheumatoid Arthritis, cause of death     Other - See Comments Sister      Stomach aneurysm         Current Outpatient Prescriptions   Medication Sig Dispense Refill     amitriptyline (ELAVIL) 25 MG tablet One by mouth at bedtime 90 tablet 1     Loperamide HCl (IMODIUM A-D PO) Take by mouth as needed       estradiol (VAGIFEM) 10 MCG TABS vaginal tablet Place 1 tablet (10 mcg) vaginally twice a week Insert 1 tablet (10mcg) by vaginal route 2 times every week 8 tablet 12     diphenoxylate-atropine (LOMOTIL) 2.5-0.025 MG per tablet Take 2 tablets by mouth 4 times daily as needed for diarrhea 30 tablet 0     L-FORMULA LYSINE HCL PO Take 1 tablet by mouth daily       Ginger, Zingiber officinalis, (GINGER ROOT) 550 MG CAPS Take 550 mg by mouth daily       UNABLE TO FIND Take 1 tablet by mouth daily MEDICATION NAME: tumeric       UNKNOWN TO PATIENT Eye drops for Glaucoma. Name unknown. 1 drop to each eye daily       Cholecalciferol (VITAMIN D PO) Take 1 tablet by mouth daily       CRANBERRY Take 1 by mouth daily       fish oil-omega-3 fatty acids (FISH OIL) 1000 MG capsule Take 1 by oral route every day       Multiple Vitamin (DAILY MULTIVITAMIN PO) Take 1 tablet by oral route every day with food       Allergies   Allergen Reactions     Augmentin Cramps       Patient over age 50, mutual decision to screen reflected in health maintenance.    Pertinent mammograms are reviewed under the imaging tab.  History of abnormal Pap smear:  "  Last 3 Pap Results:   PAP (no units)   Date Value   11/25/2014 NIL       Reviewed and updated as needed this visit by clinical staff  Tobacco  Allergies  Meds  Med Hx  Surg Hx  Fam Hx  Soc Hx        Reviewed and updated as needed this visit by Provider          Past Medical History:   Diagnosis Date     Glaucoma      Menopausal or female climacteric states 08/21/2000     Routine general medical examination at [facility] 08/18/2000      Past Surgical History:   Procedure Laterality Date     COLONOSCOPY  3-     COLONOSCOPY N/A 5/15/2017    Procedure: COLONOSCOPY;  COLONOSCOPY WITH BIOPSIES;  Surgeon: David Vieira MD;  Location: [facility]     excision of ganglion cyst       phlebectomies x11 R leg,x12 L leg      Bilateral, varicose veins     Obstetric History     No data available          ROS:  C: NEGATIVE for fever, chills, change in weight  I: NEGATIVE for worrisome rashes, moles or lesions  E: NEGATIVE for vision changes or irritation  ENT: NEGATIVE for ear, mouth and throat problems  R: NEGATIVE for significant cough or SOB  B: NEGATIVE for masses, tenderness or discharge  CV: NEGATIVE for chest pain, palpitations or peripheral edema  GI: NEGATIVE for nausea, abdominal pain, heartburn, or change in bowel habits  : NEGATIVE for unusual urinary or vaginal symptoms. No vaginal bleeding.  M: NEGATIVE for significant arthralgias or myalgia  N: NEGATIVE for weakness, dizziness or paresthesias  NEURO: NEGATIVE for weakness, dizziness or paresthesias  P: NEGATIVE for changes in mood or affect     OBJECTIVE:   /64  Pulse 87  Temp 98.5  F (36.9  C)  Ht 5' 1.5\" (1.562 m)  Wt 159 lb 3.2 oz (72.2 kg)  SpO2 98%  Breastfeeding? No  BMI 29.59 kg/m2  EXAM:  GENERAL: healthy, alert and no distress  EYES: Eyes grossly normal to inspection, PERRL and conjunctivae and sclerae normal  HENT: ear canals and TM's normal, nose and mouth without ulcers or lesions  NECK: no adenopathy, no asymmetry, masses, or " scars and thyroid normal to palpation  RESP: lungs clear to auscultation - no rales, rhonchi or wheezes  BREAST: normal without masses, tenderness or nipple discharge and no palpable axillary masses or adenopathy  CV: regular rate and rhythm, normal S1 S2, no S3 or S4, no murmur, click or rub, no peripheral edema and peripheral pulses strong  ABDOMEN: soft, nontender, no hepatosplenomegaly, no masses and bowel sounds normal   (female): normal female external genitalia, normal urethral meatus, vaginal mucosa pink, moist, well rugated, and normal cervix/adnexa/uterus without masses or discharge  RECTAL: normal sphincter tone, no rectal masses  MS: no gross musculoskeletal defects noted, no edema  SKIN: no suspicious lesions or rashes  NEURO: Normal strength and tone, mentation intact and speech normal  PSYCH: mentation appears normal, affect normal/bright  LYMPH: no cervical, supraclavicular, axillary, or inguinal adenopathy  .labs    ASSESSMENT/PLAN:   1. Well woman exam with routine gynecological exam  She is due for pap smear and labs.  Mammogram is ordered. And DEXA up to date on immunizations and her Colon was done last year.  [facility] will be having fasting labs done in [place].   - TSH; Future  - MA Screening Digital Bilateral; Future  - Lipid Profile; Future  - CBC with platelets; Future  - Comprehensive metabolic panel; Future  - A pap thin layer screen with  HPV - recommended age 30 - 65 years (select HPV order below)  - HPV High Risk Types DNA Cervical    2. Encounter for screening mammogram for breast cancer    - MA Screening Digital Bilateral; Future    3. Other glaucoma of both eyes  Pressures around 15 now has been on eye drops over 2 years. No hypertension at all in our exam.   She is feeling well and seeing Dr. Noonan.     4. Asymptomatic postmenopausal estrogen deficiency  Due for dexa. Calcium intake is good with food.   - DX Hip/Pelvis/Spine; Future    COUNSELING:   Reviewed preventive health  "counseling, as reflected in patient instructions       Regular exercise       Healthy diet/nutrition       Vision screening       Hearing screening       Osteoporosis Prevention/Bone Health       Colon cancer screening       Advance Care Planning         reports that she has never smoked. She has never used smokeless tobacco.    Estimated body mass index is 29.59 kg/(m^2) as calculated from the following:    Height as of this encounter: 5' 1.5\" (1.562 m).    Weight as of this encounter: 159 lb 3.2 oz (72.2 kg).       Counseling Resources:  ATP IV Guidelines  Pooled Cohorts Equation Calculator  Breast Cancer Risk Calculator  FRAX Risk Assessment  ICSI Preventive Guidelines  Dietary Guidelines for Americans, 2010  USDA's MyPlate  ASA Prophylaxis  Lung CA Screening    EZEQUIEL Luther  [facility] [place]  "

## 2018-03-12 NOTE — MR AVS SNAPSHOT
After Visit Summary   3/12/2018    Laney Guzman    MRN: [id number]           Patient Information     Date Of Birth          1955        Visit Information        Provider Department      3/12/2018 1:15 PM Lazara Tate PA [facility]        Today's Diagnoses     Well woman exam with routine gynecological exam    -  1    Encounter for screening mammogram for breast cancer        Other glaucoma of both eyes        Asymptomatic postmenopausal estrogen deficiency        Encounter for hepatitis C screening test for low risk patient          Care Instructions      Preventive Health Recommendations  Female Ages 50 - 64    Yearly exam: See your health care provider every year in order to  o Review health changes.   o Discuss preventive care.    o Review your medicines if your doctor has prescribed any.      Get a Pap test every three years (unless you have an abnormal result and your provider advises testing more often).    If you get Pap tests with HPV test, you only need to test every 5 years, unless you have an abnormal result.     You do not need a Pap test if your uterus was removed (hysterectomy) and you have not had cancer.    You should be tested each year for STDs (sexually transmitted diseases) if you're at risk.     Have a mammogram every 1 to 2 years.    Have a colonoscopy at age 50, or have a yearly FIT test (stool test). These exams screen for colon cancer.      Have a cholesterol test every 5 years, or more often if advised.    Have a diabetes test (fasting glucose) every three years. If you are at risk for diabetes, you should have this test more often.     If you are at risk for osteoporosis (brittle bone disease), think about having a bone density scan (DEXA).    Shots: Get a flu shot each year. Get a tetanus shot every 10 years.    Nutrition:     Eat at least 5 servings of fruits and vegetables each day.    Eat whole-grain bread, whole-wheat pasta and brown rice  instead of white grains and rice.    Talk to your provider about Calcium and Vitamin D.     Lifestyle    Exercise at least 150 minutes a week (30 minutes a day, 5 days a week). This will help you control your weight and prevent disease.    Limit alcohol to one drink per day.    No smoking.     Wear sunscreen to prevent skin cancer.     See your dentist every six months for an exam and cleaning.    See your eye doctor every 1 to 2 years.            Follow-ups after your visit        Future tests that were ordered for you today     Open Future Orders        Priority Expected Expires Ordered    Hepatitis C Screen Reflex to HCV RNA Quant and Genotype Routine  3/12/2019 3/12/2018    DX Hip/Pelvis/Spine Routine  3/12/2019 3/12/2018    TSH Routine  3/12/2019 3/12/2018    MA Screening Digital Bilateral Routine  3/12/2019 3/12/2018    Lipid Profile Routine  3/12/2019 3/12/2018    CBC with platelets Routine  3/12/2019 3/12/2018    Comprehensive metabolic panel Routine  3/12/2019 3/12/2018            Who to contact     If you have questions or need follow up information about today's clinic visit or your schedule please contact [facility] directly at [phone].  Normal or non-critical lab and imaging results will be communicated to you by MyChart, letter or phone within 4 business days after the clinic has received the results. If you do not hear from us within 7 days, please contact the clinic through [employer] or phone. If you have a critical or abnormal lab result, we will notify you by phone as soon as possible.  Submit refill requests through [employer] or call your pharmacy and they will forward the refill request to us. Please allow 3 business days for your refill to be completed.          Additional Information About Your Visit        [employer] Information     [employer] gives you secure access to your electronic health record. If you see a primary care provider, you can also send messages to your care team and  "make appointments. If you have questions, please call your primary care clinic.  If you do not have a primary care provider, please call [phone] and they will assist you.        Care EveryWhere ID     This is your Care EveryWhere ID. This could be used by other organizations to access your [facility] medical records  [id number]        Your Vitals Were     Pulse Temperature Height Pulse Oximetry Breastfeeding? BMI (Body Mass Index)    87 98.5  F (36.9  C) 5' 1.5\" (1.562 m) 98% No 29.59 kg/m2       Blood Pressure from Last 3 Encounters:   03/12/18 110/64   02/02/18 128/84   05/15/17 144/96    Weight from Last 3 Encounters:   03/12/18 159 lb 3.2 oz (72.2 kg)   02/02/18 157 lb (71.2 kg)   05/15/17 137 lb (62.1 kg)              We Performed the Following     A pap thin layer screen with  HPV - recommended age 30 - 65 years (select HPV order below)     HPV High Risk Types DNA Cervical        Primary Care Provider Office Phone # Fax #    EZEQUIEL Denson [phone]       [facility] [address] [facility] [address]  [place] [address]        Equal Access to Services     JOSE MORROW AH: Hadii aad ku hadasho Soomaali, waaxda luqadaha, qaybta kaalmada adeegyada, waxay idiin haymichaeln ericeg ruperto lalouisa ah. So [facility] [phone].    ATENCIÓN: Si habla español, tiene a delacruz disposición servicios gratuitos de asistencia lingüística. Llame al [phone].    We comply with applicable federal civil rights laws and [place] laws. We do not discriminate on the basis of race, color, national origin, age, disability, sex, sexual orientation, or gender identity.            Thank you!     Thank you for choosing [facility] [place]  for your care. Our goal is always to provide you with excellent care. Hearing back from our patients is one way we can continue to improve our services. Please take a few minutes to complete the written survey that you may receive in the mail after your visit with us. Thank you!      "        Your Updated Medication List - Protect others around you: Learn how to safely use, store and throw away your medicines at www.disposemymeds.org.          This list is accurate as of 3/12/18  1:53 PM.  Always use your most recent med list.                   Brand Name Dispense Instructions for use Diagnosis    amitriptyline 25 MG tablet    ELAVIL    90 tablet    One by mouth at bedtime    Primary insomnia       CRANBERRY      Take 1 by mouth daily        DAILY MULTIVITAMIN PO      Take 1 tablet by oral route every day with food        diphenoxylate-atropine 2.5-0.025 MG per tablet    LOMOTIL    30 tablet    Take 2 tablets by mouth 4 times daily as needed for diarrhea    Functional diarrhea       estradiol 10 MCG Tabs vaginal tablet    VAGIFEM    8 tablet    Place 1 tablet (10 mcg) vaginally twice a week Insert 1 tablet (10mcg) by vaginal route 2 times every week    Atrophic vaginitis       fish oil-omega-3 fatty acids 1000 MG capsule      Take 1 by oral route every day        ginger root 550 MG Caps capsule      Take 550 mg by mouth daily        IMODIUM A-D PO      Take by mouth as needed        L-FORMULA LYSINE HCL PO      Take 1 tablet by mouth daily        UNABLE TO FIND      Take 1 tablet by mouth daily MEDICATION NAME: tumeric        UNKNOWN TO PATIENT      Eye drops for Glaucoma. Name unknown. 1 drop to each eye daily        VITAMIN D PO      Take 1 tablet by mouth daily    Routine gynecological examination

## 2018-03-12 NOTE — NURSING NOTE
"Chief Complaint   Patient presents with     Physical       Initial /64  Pulse 87  Temp 98.5  F (36.9  C)  Ht 5' 1.5\" (1.562 m)  Wt 159 lb 3.2 oz (72.2 kg)  SpO2 98%  Breastfeeding? No  BMI 29.59 kg/m2 Estimated body mass index is 29.59 kg/(m^2) as calculated from the following:    Height as of this encounter: 5' 1.5\" (1.562 m).    Weight as of this encounter: 159 lb 3.2 oz (72.2 kg).  Medication Reconciliation: complete   [facility]   Medical Assistant      "

## 2018-03-13 ASSESSMENT — ANXIETY QUESTIONNAIRES: GAD7 TOTAL SCORE: 0

## 2018-03-13 ASSESSMENT — PATIENT HEALTH QUESTIONNAIRE - PHQ9: SUM OF ALL RESPONSES TO PHQ QUESTIONS 1-9: 0

## 2018-03-14 DIAGNOSIS — Z01.419 WELL WOMAN EXAM WITH ROUTINE GYNECOLOGICAL EXAM: ICD-10-CM

## 2018-03-14 DIAGNOSIS — Z11.59 ENCOUNTER FOR HEPATITIS C SCREENING TEST FOR LOW RISK PATIENT: ICD-10-CM

## 2018-03-14 LAB
ALBUMIN SERPL-MCNC: 3.7 G/DL (ref 3.4–5)
ALP SERPL-CCNC: 64 U/L (ref 40–150)
ALT SERPL W P-5'-P-CCNC: 40 U/L (ref 0–50)
ANION GAP SERPL CALCULATED.3IONS-SCNC: 9 MMOL/L (ref 3–14)
AST SERPL W P-5'-P-CCNC: 25 U/L (ref 0–45)
BILIRUB SERPL-MCNC: 0.5 MG/DL (ref 0.2–1.3)
BUN SERPL-MCNC: 13 MG/DL (ref 7–30)
CALCIUM SERPL-MCNC: 8.9 MG/DL (ref 8.5–10.1)
CHLORIDE SERPL-SCNC: 103 MMOL/L (ref 94–109)
CHOLEST SERPL-MCNC: 246 MG/DL
CO2 SERPL-SCNC: 26 MMOL/L (ref 20–32)
CREAT SERPL-MCNC: 0.64 MG/DL (ref 0.52–1.04)
ERYTHROCYTE [DISTWIDTH] IN BLOOD BY AUTOMATED COUNT: 14.3 % (ref 10–15)
GFR SERPL CREATININE-BSD FRML MDRD: >90 ML/MIN/1.7M2
GLUCOSE SERPL-MCNC: 93 MG/DL (ref 70–99)
HCT VFR BLD AUTO: 40.1 % (ref 35–47)
HDLC SERPL-MCNC: 83 MG/DL
HGB BLD-MCNC: 13.3 G/DL (ref 11.7–15.7)
LDLC SERPL CALC-MCNC: 137 MG/DL
MCH RBC QN AUTO: 30.7 PG (ref 26.5–33)
MCHC RBC AUTO-ENTMCNC: 33.2 G/DL (ref 31.5–36.5)
MCV RBC AUTO: 93 FL (ref 78–100)
NONHDLC SERPL-MCNC: 163 MG/DL
PLATELET # BLD AUTO: 272 10E9/L (ref 150–450)
POTASSIUM SERPL-SCNC: 4 MMOL/L (ref 3.4–5.3)
PROT SERPL-MCNC: 7.5 G/DL (ref 6.8–8.8)
RBC # BLD AUTO: 4.33 10E12/L (ref 3.8–5.2)
SODIUM SERPL-SCNC: 138 MMOL/L (ref 133–144)
TRIGL SERPL-MCNC: 132 MG/DL
TSH SERPL DL<=0.005 MIU/L-ACNC: 6.57 MU/L (ref 0.4–4)
WBC # BLD AUTO: 7.4 10E9/L (ref 4–11)

## 2018-03-14 PROCEDURE — 80053 COMPREHEN METABOLIC PANEL: CPT | Performed by: PHYSICIAN ASSISTANT

## 2018-03-14 PROCEDURE — 86803 HEPATITIS C AB TEST: CPT | Mod: 90 | Performed by: PHYSICIAN ASSISTANT

## 2018-03-14 PROCEDURE — 99000 SPECIMEN HANDLING OFFICE-LAB: CPT | Performed by: PHYSICIAN ASSISTANT

## 2018-03-14 PROCEDURE — 84443 ASSAY THYROID STIM HORMONE: CPT | Performed by: PHYSICIAN ASSISTANT

## 2018-03-14 PROCEDURE — 36415 COLL VENOUS BLD VENIPUNCTURE: CPT | Performed by: PHYSICIAN ASSISTANT

## 2018-03-14 PROCEDURE — 85027 COMPLETE CBC AUTOMATED: CPT | Performed by: PHYSICIAN ASSISTANT

## 2018-03-14 PROCEDURE — 80061 LIPID PANEL: CPT | Performed by: PHYSICIAN ASSISTANT

## 2018-03-15 LAB — HCV AB SERPL QL IA: NONREACTIVE

## 2018-03-16 LAB
COPATH REPORT: NORMAL
PAP: NORMAL

## 2018-03-20 LAB
FINAL DIAGNOSIS: NORMAL
HPV HR 12 DNA CVX QL NAA+PROBE: NEGATIVE
HPV16 DNA SPEC QL NAA+PROBE: NEGATIVE
HPV18 DNA SPEC QL NAA+PROBE: NEGATIVE
SPECIMEN DESCRIPTION: NORMAL
SPECIMEN SOURCE CVX/VAG CYTO: NORMAL

## 2018-03-21 ENCOUNTER — HOSPITAL ENCOUNTER (OUTPATIENT)
Dept: BONE DENSITY | Facility: HOSPITAL | Age: 63
Discharge: HOME | End: 2018-03-21
Attending: PHYSICIAN ASSISTANT | Admitting: PHYSICIAN ASSISTANT
Payer: COMMERCIAL

## 2018-03-21 ENCOUNTER — RADIANT APPOINTMENT (OUTPATIENT)
Dept: MAMMOGRAPHY | Facility: OTHER | Age: 63
End: 2018-03-21
Attending: PHYSICIAN ASSISTANT
Payer: COMMERCIAL

## 2018-03-21 DIAGNOSIS — Z01.419 WELL WOMAN EXAM WITH ROUTINE GYNECOLOGICAL EXAM: ICD-10-CM

## 2018-03-21 DIAGNOSIS — Z12.31 ENCOUNTER FOR SCREENING MAMMOGRAM FOR BREAST CANCER: ICD-10-CM

## 2018-03-21 DIAGNOSIS — Z78.0 ASYMPTOMATIC POSTMENOPAUSAL ESTROGEN DEFICIENCY: ICD-10-CM

## 2018-03-21 PROCEDURE — 77067 SCR MAMMO BI INCL CAD: CPT | Mod: TC

## 2018-03-21 PROCEDURE — 77080 DXA BONE DENSITY AXIAL: CPT | Mod: TC

## 2018-03-21 PROCEDURE — 77063 BREAST TOMOSYNTHESIS BI: CPT | Mod: TC

## 2018-03-23 ENCOUNTER — OFFICE VISIT (OUTPATIENT)
Dept: FAMILY MEDICINE | Facility: OTHER | Age: 63
End: 2018-03-23
Attending: PHYSICIAN ASSISTANT
Payer: COMMERCIAL

## 2018-03-23 VITALS
OXYGEN SATURATION: 95 % | WEIGHT: 158.2 LBS | DIASTOLIC BLOOD PRESSURE: 80 MMHG | BODY MASS INDEX: 29.11 KG/M2 | TEMPERATURE: 98.5 F | HEART RATE: 76 BPM | HEIGHT: 62 IN | SYSTOLIC BLOOD PRESSURE: 100 MMHG

## 2018-03-23 DIAGNOSIS — R79.89 ELEVATED TSH: Primary | ICD-10-CM

## 2018-03-23 DIAGNOSIS — L57.0 AK (ACTINIC KERATOSIS): ICD-10-CM

## 2018-03-23 LAB
T4 FREE SERPL-MCNC: 0.92 NG/DL (ref 0.76–1.46)
TSH SERPL DL<=0.005 MIU/L-ACNC: 8.87 MU/L (ref 0.4–4)

## 2018-03-23 PROCEDURE — 84439 ASSAY OF FREE THYROXINE: CPT | Performed by: PHYSICIAN ASSISTANT

## 2018-03-23 PROCEDURE — 17000 DESTRUCT PREMALG LESION: CPT | Performed by: PHYSICIAN ASSISTANT

## 2018-03-23 PROCEDURE — 99000 SPECIMEN HANDLING OFFICE-LAB: CPT | Performed by: PHYSICIAN ASSISTANT

## 2018-03-23 PROCEDURE — 17003 DESTRUCT PREMALG LES 2-14: CPT | Performed by: PHYSICIAN ASSISTANT

## 2018-03-23 PROCEDURE — 84481 FREE ASSAY (FT-3): CPT | Mod: 90 | Performed by: PHYSICIAN ASSISTANT

## 2018-03-23 PROCEDURE — 99213 OFFICE O/P EST LOW 20 MIN: CPT | Mod: 25 | Performed by: PHYSICIAN ASSISTANT

## 2018-03-23 PROCEDURE — 84443 ASSAY THYROID STIM HORMONE: CPT | Performed by: PHYSICIAN ASSISTANT

## 2018-03-23 PROCEDURE — 36415 COLL VENOUS BLD VENIPUNCTURE: CPT | Performed by: PHYSICIAN ASSISTANT

## 2018-03-23 PROCEDURE — 86800 THYROGLOBULIN ANTIBODY: CPT | Mod: 90 | Performed by: PHYSICIAN ASSISTANT

## 2018-03-23 ASSESSMENT — PAIN SCALES - GENERAL: PAINLEVEL: NO PAIN (0)

## 2018-03-23 ASSESSMENT — ANXIETY QUESTIONNAIRES
1. FEELING NERVOUS, ANXIOUS, OR ON EDGE: NOT AT ALL
4. TROUBLE RELAXING: NOT AT ALL
5. BEING SO RESTLESS THAT IT IS HARD TO SIT STILL: NOT AT ALL
3. WORRYING TOO MUCH ABOUT DIFFERENT THINGS: NOT AT ALL
2. NOT BEING ABLE TO STOP OR CONTROL WORRYING: NOT AT ALL
6. BECOMING EASILY ANNOYED OR IRRITABLE: NOT AT ALL
GAD7 TOTAL SCORE: 0
7. FEELING AFRAID AS IF SOMETHING AWFUL MIGHT HAPPEN: NOT AT ALL

## 2018-03-23 NOTE — PATIENT INSTRUCTIONS
Symptoms of too little thyroid hormone:  Fatigue  Weight gain  Fluid in extremities  Constipation  Irregular menses  Depression  Coarseness or loss of hair  Decreased concentration  Hoarseness    Symptoms of too much thyroid hormone:  Weight loss  Sweating  Tremor  Diarrhea  Irregular menses  Nervousness, irritability  Insomnia

## 2018-03-23 NOTE — MR AVS SNAPSHOT
After Visit Summary   3/23/2018    Laney Guzman    MRN: [id number]           Patient Information     Date Of Birth          1955        Visit Information        Provider Department      3/23/2018 2:30 PM Lazara Tate PA [facility]        Today's Diagnoses     Elevated TSH    -  1    AK (actinic keratosis)          Care Instructions    Symptoms of too little thyroid hormone:  Fatigue  Weight gain  Fluid in extremities  Constipation  Irregular menses  Depression  Coarseness or loss of hair  Decreased concentration  Hoarseness    Symptoms of too much thyroid hormone:  Weight loss  Sweating  Tremor  Diarrhea  Irregular menses  Nervousness, irritability  Insomnia            Follow-ups after your visit        Who to contact     If you have questions or need follow up information about today's clinic visit or your schedule please contact [facility] directly at [phone].  Normal or non-critical lab and imaging results will be communicated to you by MyChart, letter or phone within 4 business days after the clinic has received the results. If you do not hear from us within 7 days, please contact the clinic through MyChart or phone. If you have a critical or abnormal lab result, we will notify you by phone as soon as possible.  Submit refill requests through "[employer]" or call your pharmacy and they will forward the refill request to us. Please allow 3 business days for your refill to be completed.          Additional Information About Your Visit        MyChart Information     "[employer]" gives you secure access to your electronic health record. If you see a primary care provider, you can also send messages to your care team and make appointments. If you have questions, please call your primary care clinic.  If you do not have a primary care provider, please call [phone] and they will assist you.        Care EveryWhere ID     This is your Care EveryWhere ID. This  "could be used by other organizations to access your [facility] medical records  [id number]        Your Vitals Were     Pulse Temperature Height Pulse Oximetry BMI (Body Mass Index)       76 98.5  F (36.9  C) 5' 1.5\" (1.562 m) 95% 29.41 kg/m2        Blood Pressure from Last 3 Encounters:   03/23/18 100/80   03/12/18 110/64   02/02/18 128/84    Weight from Last 3 Encounters:   03/23/18 158 lb 3.2 oz (71.8 kg)   03/12/18 159 lb 3.2 oz (72.2 kg)   02/02/18 157 lb (71.2 kg)              We Performed the Following     Anti thyroglobulin antibody     DESTRUCT PREMALIGNANT LESION, 2-14     DESTRUCT PREMALIGNANT LESION, FIRST     T3 Free     T4, free     TSH        Primary Care Provider Office Phone # Fax #    EZEQUIEL Denson [phone]       [facility] [address] [place] [address]  [place] [address]        Equal Access to Services     [facility] : Hadii aad ku hadasho Soomaali, waaxda luqadaha, qaybta kaalmada adeegyada, waxay vishalin yoselin ramirez . So [facility] [phone].    ATENCIÓN: Si habla español, tiene a delacruz disposición servicios gratuitos de asistencia lingüística. Llame al [phone].    We comply with applicable federal civil rights laws and [place] laws. We do not discriminate on the basis of race, color, national origin, age, disability, sex, sexual orientation, or gender identity.            Thank you!     Thank you for choosing [facility] [place]  for your care. Our goal is always to provide you with excellent care. Hearing back from our patients is one way we can continue to improve our services. Please take a few minutes to complete the written survey that you may receive in the mail after your visit with us. Thank you!             Your Updated Medication List - Protect others around you: Learn how to safely use, store and throw away your medicines at www.disposemymeds.org.          This list is accurate as of 3/23/18  3:33 PM.  Always use your most recent " med list.                   Brand Name Dispense Instructions for use Diagnosis    amitriptyline 25 MG tablet    ELAVIL    90 tablet    One by mouth at bedtime    Primary insomnia       CRANBERRY      Take 1 by mouth daily        DAILY MULTIVITAMIN PO      Take 1 tablet by oral route every day with food        diphenoxylate-atropine 2.5-0.025 MG per tablet    LOMOTIL    30 tablet    Take 2 tablets by mouth 4 times daily as needed for diarrhea    Functional diarrhea       estradiol 10 MCG Tabs vaginal tablet    VAGIFEM    8 tablet    Place 1 tablet (10 mcg) vaginally twice a week Insert 1 tablet (10mcg) by vaginal route 2 times every week    Atrophic vaginitis       fish oil-omega-3 fatty acids 1000 MG capsule      Take 1 by oral route every day        ginger root 550 MG Caps capsule      Take 550 mg by mouth daily        IMODIUM A-D PO      Take by mouth as needed        L-FORMULA LYSINE HCL PO      Take 1 tablet by mouth daily        UNABLE TO FIND      Take 1 tablet by mouth daily MEDICATION NAME: tumeric        UNKNOWN TO PATIENT      Eye drops for Glaucoma. Name unknown. 1 drop to each eye daily        VITAMIN D PO      Take 1 tablet by mouth daily    Routine gynecological examination

## 2018-03-23 NOTE — NURSING NOTE
"Chief Complaint   Patient presents with     Derm Problem       Initial /80  Pulse 76  Temp 98.5  F (36.9  C)  Ht 5' 1.5\" (1.562 m)  Wt 158 lb 3.2 oz (71.8 kg)  SpO2 95%  BMI 29.41 kg/m2 Estimated body mass index is 29.41 kg/(m^2) as calculated from the following:    Height as of this encounter: 5' 1.5\" (1.562 m).    Weight as of this encounter: 158 lb 3.2 oz (71.8 kg).  Medication Reconciliation: complete   [facility]   Medical Assistant      "

## 2018-03-23 NOTE — PROGRESS NOTES
SUBJECTIVE:   Laney Guzman is a 63 year old female who presents to clinic today for the following health issues:      Moles      Duration: For a long time    Description (location/character/radiation): Chest ,Back,Left groin Area    Intensity:  0/10    Accompanying signs and symptoms: Some raised,,brown in color.    History (similar episodes/previous evaluation): Going on for a long time    Precipitating or alleviating factors: None    Therapies tried and outcome: Had some removed a long time ago    Dr Moeller and some removed from [place].-that went fine per pt.           Abnormal thyroid labs.       Duration: has fatigue     Description (location/character/radiation): elevated TSH    Intensity:  moderate    Accompanying signs and symptoms: none.     History (similar episodes/previous evaluation): None    Precipitating or alleviating factors: doing further testing today.     Therapies tried and outcome: once we get testing back we will be going to add in some levothyroxine.        Problem list and histories reviewed & adjusted, as indicated.  Additional history: as documented    Patient Active Problem List   Diagnosis     ACP (advance care planning)     Other specified glaucoma     Irritable bowel syndrome with diarrhea     Past Surgical History:   Procedure Laterality Date     COLONOSCOPY  3-     COLONOSCOPY N/A 5/15/2017    Procedure: COLONOSCOPY;  COLONOSCOPY WITH BIOPSIES;  Surgeon: David Vieira MD;  Location: [facility] OR     excision of ganglion cyst       phlebectomies x11 R leg,x12 L leg      Bilateral, varicose veins       Social History   Substance Use Topics     Smoking status: Never Smoker     Smokeless tobacco: Never Used      Comment: no passive exposure     Alcohol use Yes      Comment: socially     Family History   Problem Relation Age of Onset     CANCER Father      Other - See Comments Father 82     Emphysema, cause of death     Arthritis Mother 62     Rheumatoid Arthritis, cause of  death     Other - See Comments Sister      Stomach aneurysm         Current Outpatient Prescriptions   Medication Sig Dispense Refill     amitriptyline (ELAVIL) 25 MG tablet One by mouth at bedtime 90 tablet 1     Loperamide HCl (IMODIUM A-D PO) Take by mouth as needed       estradiol (VAGIFEM) 10 MCG TABS vaginal tablet Place 1 tablet (10 mcg) vaginally twice a week Insert 1 tablet (10mcg) by vaginal route 2 times every week 8 tablet 12     diphenoxylate-atropine (LOMOTIL) 2.5-0.025 MG per tablet Take 2 tablets by mouth 4 times daily as needed for diarrhea 30 tablet 0     L-FORMULA LYSINE HCL PO Take 1 tablet by mouth daily       Ginger, Zingiber officinalis, (GINGER ROOT) 550 MG CAPS Take 550 mg by mouth daily       UNABLE TO FIND Take 1 tablet by mouth daily MEDICATION NAME: tumeric       UNKNOWN TO PATIENT Eye drops for Glaucoma. Name unknown. 1 drop to each eye daily       Cholecalciferol (VITAMIN D PO) Take 1 tablet by mouth daily       CRANBERRY Take 1 by mouth daily       fish oil-omega-3 fatty acids (FISH OIL) 1000 MG capsule Take 1 by oral route every day       Multiple Vitamin (DAILY MULTIVITAMIN PO) Take 1 tablet by oral route every day with food       Allergies   Allergen Reactions     Augmentin Cramps     Recent Labs   Lab Test  03/14/18   0819  02/13/17   0903  09/26/16   1619  02/05/16   0839  11/26/14   0808   LDL  137*  108*   --   110*  142*   HDL  83  82   --   94  70   TRIG  132  131   --   85  136   ALT  40   --    --   31  27   CR  0.64   --   0.68  0.63  0.62   GFRESTIMATED  >90   --   88  >90  Non  GFR Calc    >90  Non  GFR Calc     GFRESTBLACK  >90   --   >90   GFR Calc    >90   GFR Calc    >90   GFR Calc     POTASSIUM  4.0   --   3.7  3.9  3.8   TSH  6.57*  2.61   --   3.72  3.77      BP Readings from Last 3 Encounters:   03/23/18 100/80   03/12/18 110/64   02/02/18 128/84    Wt Readings from Last 3 Encounters:  "  03/23/18 158 lb 3.2 oz (71.8 kg)   03/12/18 159 lb 3.2 oz (72.2 kg)   02/02/18 157 lb (71.2 kg)                    Reviewed and updated as needed this visit by clinical staff  Tobacco  Allergies  Meds  Med Hx  Surg Hx  Fam Hx  Soc Hx      Reviewed and updated as needed this visit by Provider         ROS:  Constitutional, HEENT, cardiovascular, pulmonary, gi and gu systems are negative, except as otherwise noted.    OBJECTIVE:                                                    /80  Pulse 76  Temp 98.5  F (36.9  C)  Ht 5' 1.5\" (1.562 m)  Wt 158 lb 3.2 oz (71.8 kg)  SpO2 95%  BMI 29.41 kg/m2  Body mass index is 29.41 kg/(m^2).  GENERAL APPEARANCE: healthy, alert and no distress  SKIN: there are 15 moles on her neck upper back mid back and anterior chest taht are frozen and thaw x3.  They were all over 6 mm and largest was 15mm.   Varied in size and ware waxy and irritated along bra and neck line.  Frozen and thaw x3 per protocol.   PSYCH: mentation appears normal and affect normal/bright         ASSESSMENT/PLAN:                                                    1. Elevated TSH  Check labs was elevated.   - TSH  - T4, free  - T3 Free  - Anti thyroglobulin antibody        2. AK (actinic keratosis)  Frozen per protocol and band aides.  Will use triple abx ointment on this for cover.  Notify if any  concern and we will recheck in one month.   - DESTRUCT PREMALIGNANT LESION, FIRST  - DESTRUCT PREMALIGNANT LESION, 2-14    See Patient Instructions    EZEQUIEL Luther  [facility] [place]  "

## 2018-03-24 ASSESSMENT — ANXIETY QUESTIONNAIRES: GAD7 TOTAL SCORE: 0

## 2018-03-24 ASSESSMENT — PATIENT HEALTH QUESTIONNAIRE - PHQ9: SUM OF ALL RESPONSES TO PHQ QUESTIONS 1-9: 0

## 2018-03-26 LAB — T3FREE SERPL-MCNC: 3.1 PG/ML (ref 2.3–4.2)

## 2018-03-27 LAB — THYROGLOB AB SERPL IA-ACNC: <20 IU/ML (ref 0–40)

## 2018-03-29 ENCOUNTER — TELEPHONE (OUTPATIENT)
Dept: FAMILY MEDICINE | Facility: OTHER | Age: 63
End: 2018-03-29

## 2018-03-29 DIAGNOSIS — R79.89 ELEVATED TSH: Primary | ICD-10-CM

## 2018-03-29 NOTE — TELEPHONE ENCOUNTER
Reason for call:  RESULTS    1. What is the test that was ordered? Thyroid  2. Who ordered your test? Dr. Tate  3. When was the test performed?  3/23/18  Description: Patient would like results from the thyroid tests.   Was an appointment offered for this a call? No  If Yes :  Appointment type                 Date    Preferred method for responding to this message: Telephone Call  What is your phone number ? [phone] or [phone]    If we cannot reach you directly, may we leave a detailed response at the number you provided? Yes  Can this message wait until your PCP/Provider returns if not available today? N/A

## 2018-03-29 NOTE — TELEPHONE ENCOUNTER
She is not in favor of taking medication. Can let her know that will having to continue to follow us or see Endocrine to see what they recommend in [place]

## 2018-03-29 NOTE — TELEPHONE ENCOUNTER
Lab results sent via [employer]; pt questions the TSH.  Start on thyroid rx?- or continue to watch?  Called pt; she has never taken thyroid rx, is not symptomatic.  Wait for a 3 month recheck?  Message to Cande.  Francy Mathis

## 2018-03-30 NOTE — TELEPHONE ENCOUNTER
Pt would like to be referred to endocrinology.  She would prefer to see someone in the area, if available.  Not sure if Dr Alves goes to [facility].  Francy Mathis

## 2018-03-30 NOTE — TELEPHONE ENCOUNTER
Pt called and stated nurse was suppose to call her back regarding test results. Please call her back at [phone]

## 2018-04-02 NOTE — TELEPHONE ENCOUNTER
Returned pt phone call.  Informed her of endocrinology referral pending with Dr Alves at [facility] in [facility], if possible.  They will call her with appt.  Pt verbalized understanding.  Francy Mathis

## 2018-04-03 ENCOUNTER — TELEPHONE (OUTPATIENT)
Dept: FAMILY MEDICINE | Facility: OTHER | Age: 63
End: 2018-04-03

## 2018-04-03 NOTE — TELEPHONE ENCOUNTER
8:07 AM    Reason for Call: Phone Call    Description: Pt has a question about her referral. She was supposed to be referred to an endocrinologist that visits [facility], but it has not been done yet. Please review and see if you can get that done for her. Thank you!    Was an appointment offered for this call? No  If yes : Appointment type              Date    Preferred method for responding to this message: Telephone Call  What is your phone number ? [phone]    If we cannot reach you directly, may we leave a detailed response at the number you provided? Yes    Can this message wait until your PCP/provider returns, if available today? YES, aware provider is out today    Jong Matthew

## 2018-04-04 ENCOUNTER — TELEPHONE (OUTPATIENT)
Dept: FAMILY MEDICINE | Facility: OTHER | Age: 63
End: 2018-04-04

## 2018-04-04 DIAGNOSIS — R94.6 THYROID FUNCTION TEST ABNORMAL: Primary | ICD-10-CM

## 2018-04-04 NOTE — TELEPHONE ENCOUNTER
Pt called back; states that she called [facility] regarding referral to see Dr Alves- and if he goes to the [facility].  They informed her that they did not get a referral yet.  Pt would like scheduled as soon as possible so she may schedule her vacation around it.  Order pended.  Francy Mathis

## 2018-09-27 ENCOUNTER — TELEPHONE (OUTPATIENT)
Dept: FAMILY MEDICINE | Facility: OTHER | Age: 63
End: 2018-09-27

## 2018-09-27 DIAGNOSIS — N95.2 ATROPHIC VAGINITIS: ICD-10-CM

## 2018-09-27 RX ORDER — ESTRADIOL 10 UG/1
10 INSERT VAGINAL
Qty: 8 TABLET | Refills: 12 | Status: SHIPPED | OUTPATIENT
Start: 2018-09-27 | End: 2019-03-26

## 2019-01-01 NOTE — TELEPHONE ENCOUNTER
Patient called and is needing Vagifem medication. Patient states she has not has the medication for a while but would like to have a supply on hand. Please advise.  [place], CMA   
Yes

## 2019-01-04 DIAGNOSIS — E03.9 HYPOTHYROIDISM, ADULT: Primary | ICD-10-CM

## 2019-01-04 PROCEDURE — 36415 COLL VENOUS BLD VENIPUNCTURE: CPT | Performed by: INTERNAL MEDICINE

## 2019-01-04 PROCEDURE — 84443 ASSAY THYROID STIM HORMONE: CPT | Performed by: INTERNAL MEDICINE

## 2019-01-07 ENCOUNTER — TRANSFERRED RECORDS (OUTPATIENT)
Dept: HEALTH INFORMATION MANAGEMENT | Facility: CLINIC | Age: 64
End: 2019-01-07

## 2019-01-07 LAB — TSH SERPL DL<=0.005 MIU/L-ACNC: 3.99 MU/L (ref 0.4–4)

## 2019-03-21 NOTE — PROGRESS NOTES
SUBJECTIVE:   CC: Laney Guzman is an 64 year old woman who presents for preventive health visit.     Healthy Habits:    Do you get at least three servings of calcium containing foods daily (dairy, green leafy vegetables, etc.)? yes    Amount of exercise or daily activities, outside of work: 5 day(s) per week    Problems taking medications regularly No    Medication side effects: No    Have you had an eye exam in the past two years? yes    Do you see a dentist twice per year? yes    Do you have sleep apnea, excessive snoring or daytime drowsiness?no          Today's PHQ-2 Score:   PHQ-2 ( 1999 Pfizer) 3/26/2019 11/21/2013   Q1: Little interest or pleasure in doing things 0 0   Q2: Feeling down, depressed or hopeless 0 0   PHQ-2 Score 0 0       Abuse: Current or Past(Physical, Sexual or Emotional)- No  Do you feel safe in your environment? Yes    Social History     Tobacco Use     Smoking status: Never Smoker     Smokeless tobacco: Never Used     Tobacco comment: no passive exposure   Substance Use Topics     Alcohol use: Yes     Comment: socially     If you drink alcohol do you typically have >3 drinks per day or >7 drinks per week? No                     Reviewed orders with patient.  Reviewed health maintenance and updated orders accordingly - Yes  Labs reviewed in EPIC  BP Readings from Last 3 Encounters:   03/26/19 110/70   03/23/18 100/80   03/12/18 110/64    Wt Readings from Last 3 Encounters:   03/26/19 68.9 kg (152 lb)   03/23/18 71.8 kg (158 lb 3.2 oz)   03/12/18 72.2 kg (159 lb 3.2 oz)                  Patient Active Problem List   Diagnosis     ACP (advance care planning)     Other specified glaucoma     Irritable bowel syndrome with diarrhea     Atrophic vaginitis     Past Surgical History:   Procedure Laterality Date     COLONOSCOPY  3-     COLONOSCOPY N/A 5/15/2017    Procedure: COLONOSCOPY;  COLONOSCOPY WITH BIOPSIES;  Surgeon: David Vieira MD;  Location: [facility]     excision of  ganglion cyst       phlebectomies x11 R leg,x12 L leg      Bilateral, varicose veins       Social History     Tobacco Use     Smoking status: Never Smoker     Smokeless tobacco: Never Used     Tobacco comment: no passive exposure   Substance Use Topics     Alcohol use: Yes     Comment: socially     Family History   Problem Relation Age of Onset     Cancer Father      Other - See Comments Father 82        Emphysema, cause of death     Arthritis Mother 62        Rheumatoid Arthritis, cause of death     Other - See Comments Sister         Stomach aneurysm         Current Outpatient Medications   Medication Sig Dispense Refill     Cholecalciferol (VITAMIN D PO) Take 1 tablet by mouth daily       CRANBERRY Take 1 by mouth daily       diphenoxylate-atropine (LOMOTIL) 2.5-0.025 MG per tablet Take 2 tablets by mouth 4 times daily as needed for diarrhea 30 tablet 0     fish oil-omega-3 fatty acids (FISH OIL) 1000 MG capsule Take 1 by oral route every day       Ginger, Zingiber officinalis, (GINGER ROOT) 550 MG CAPS Take 550 mg by mouth daily       L-FORMULA LYSINE HCL PO Take 1 tablet by mouth daily       Loperamide HCl (IMODIUM A-D PO) Take by mouth as needed       Multiple Vitamin (DAILY MULTIVITAMIN PO) Take 1 tablet by oral route every day with food       UNABLE TO FIND Take 1 tablet by mouth daily MEDICATION NAME: tumeric       UNKNOWN TO PATIENT Eye drops for Glaucoma. Name unknown. 1 drop to each eye daily       Allergies   Allergen Reactions     Augmentin Cramps       Mammogram Screening: Patient over age 50, mutual decision to screen reflected in health maintenance.    Pertinent mammograms are reviewed under the imaging tab.  History of abnormal Pap smear:   Last 3 Pap and HPV Results:   PAP / HPV Latest Ref Rng & Units 3/12/2018 11/25/2014   PAP - NIL NIL   HPV 16 DNA NEG:Negative Negative -   HPV 18 DNA NEG:Negative Negative -   OTHER HR HPV NEG:Negative Negative -     PAP / HPV Latest Ref Rng & Units 3/12/2018  "11/25/2014   PAP - NIL NIL   HPV 16 DNA NEG:Negative Negative -   HPV 18 DNA NEG:Negative Negative -   OTHER HR HPV NEG:Negative Negative -     Reviewed and updated as needed this visit by clinical staff  Tobacco  Allergies         Reviewed and updated as needed this visit by Provider          Past Medical History:   Diagnosis Date     Glaucoma      Menopausal or female climacteric states 08/21/2000     Routine general medical examination at [facility] 08/18/2000      Past Surgical History:   Procedure Laterality Date     COLONOSCOPY  3-     COLONOSCOPY N/A 5/15/2017    Procedure: COLONOSCOPY;  COLONOSCOPY WITH BIOPSIES;  Surgeon: David Vieira MD;  Location: [facility]     excision of ganglion cyst       phlebectomies x11 R leg,x12 L leg      Bilateral, varicose veins     Obstetric History     No data available          ROS:  CONSTITUTIONAL: NEGATIVE for fever, chills, change in weight  INTEGUMENTARY/SKIN: NEGATIVE for worrisome rashes, moles or lesions  EYES: NEGATIVE for vision changes or irritation  ENT: NEGATIVE for ear, mouth and throat problems  RESP: NEGATIVE for significant cough or SOB  BREAST: NEGATIVE for masses, tenderness or discharge  CV: NEGATIVE for chest pain, palpitations or peripheral edema  GI: NEGATIVE for nausea, abdominal pain, heartburn, or change in bowel habits  : NEGATIVE for unusual urinary or vaginal symptoms. No vaginal bleeding.  MUSCULOSKELETAL: NEGATIVE for significant arthralgias or myalgia  NEURO: NEGATIVE for weakness, dizziness or paresthesias  ENDOCRINE: NEGATIVE for temperature intolerance, skin/hair changes  PSYCHIATRIC: NEGATIVE for changes in mood or affect     OBJECTIVE:   /70 (BP Location: Left arm, Patient Position: Sitting, Cuff Size: Adult Regular)   Pulse 73   Temp 98.4  F (36.9  C) (Tympanic)   Ht 1.562 m (5' 1.5\")   Wt 68.9 kg (152 lb)   SpO2 98%   BMI 28.26 kg/m    EXAM:  GENERAL: healthy, alert and no distress  EYES: Eyes grossly normal to " "inspection, PERRL and conjunctivae and sclerae normal  HENT: ear canals and TM's normal, nose and mouth without ulcers or lesions  NECK: no adenopathy, no asymmetry, masses, or scars and thyroid normal to palpation  RESP: lungs clear to auscultation - no rales, rhonchi or wheezes  BREAST: normal without masses, tenderness or nipple discharge and no palpable axillary masses or adenopathy  CV: regular rate and rhythm, normal S1 S2, no S3 or S4, no murmur, click or rub, no peripheral edema and peripheral pulses strong  ABDOMEN: soft, nontender, no hepatosplenomegaly, no masses and bowel sounds normal. Labia are pale and thin. Vaginal walls are thin,  MS: no gross musculoskeletal defects noted, no edema  SKIN: no suspicious lesions or rashes  NEURO: Normal strength and tone, mentation intact and speech normal  PSYCH: mentation appears normal, affect normal/bright    Diagnostic Test Results:  No results found for this or any previous visit (from the past 24 hour(s)).  Orders Only on 2019   Component Date Value Ref Range Status     TSH 2019 3.99  0.40 - 4.00 mU/L Final       ASSESSMENT/PLAN:   1. Well woman exam  She is doing well. No need for pap smear. Mammogram done. Immunizations reviewed. Feeling well. Has worked on weight loss and exercise.     2. Atrophic vaginitis  She is going to change to a formulary alternative. Let me know how this is working.       COUNSELING:   Reviewed preventive health counseling, as reflected in patient instructions       Regular exercise       Healthy diet/nutrition       Vision screening       HIV screeninx in teen years, 1x in adult years, and at intervals if high risk       Advance Care Planning    BP Readings from Last 1 Encounters:   19 110/70     Estimated body mass index is 28.26 kg/m  as calculated from the following:    Height as of this encounter: 1.562 m (5' 1.5\").    Weight as of this encounter: 68.9 kg (152 lb).     reports that  has never smoked. she " has never used smokeless tobacco.      Counseling Resources:  ATP IV Guidelines  Pooled Cohorts Equation Calculator  Breast Cancer Risk Calculator  FRAX Risk Assessment  ICSI Preventive Guidelines  Dietary Guidelines for Americans, 2010  USDA's MyPlate  ASA Prophylaxis  Lung CA Screening    Lazara Tate PA-C  [facility] - [place]

## 2019-03-26 ENCOUNTER — OFFICE VISIT (OUTPATIENT)
Dept: FAMILY MEDICINE | Facility: OTHER | Age: 64
End: 2019-03-26
Attending: PHYSICIAN ASSISTANT
Payer: COMMERCIAL

## 2019-03-26 VITALS
HEIGHT: 62 IN | OXYGEN SATURATION: 98 % | BODY MASS INDEX: 27.97 KG/M2 | SYSTOLIC BLOOD PRESSURE: 110 MMHG | DIASTOLIC BLOOD PRESSURE: 70 MMHG | HEART RATE: 73 BPM | WEIGHT: 152 LBS | TEMPERATURE: 98.4 F

## 2019-03-26 DIAGNOSIS — Z01.419 WELL WOMAN EXAM: Primary | ICD-10-CM

## 2019-03-26 DIAGNOSIS — N95.2 ATROPHIC VAGINITIS: ICD-10-CM

## 2019-03-26 DIAGNOSIS — Z12.31 VISIT FOR SCREENING MAMMOGRAM: ICD-10-CM

## 2019-03-26 PROCEDURE — 99396 PREV VISIT EST AGE 40-64: CPT | Performed by: PHYSICIAN ASSISTANT

## 2019-03-26 RX ORDER — ESTRADIOL 10 UG/1
10 INSERT VAGINAL
Qty: 24 TABLET | Refills: 3 | Status: SHIPPED | OUTPATIENT
Start: 2019-03-28 | End: 2020-08-19

## 2019-03-26 ASSESSMENT — ANXIETY QUESTIONNAIRES
7. FEELING AFRAID AS IF SOMETHING AWFUL MIGHT HAPPEN: NOT AT ALL
5. BEING SO RESTLESS THAT IT IS HARD TO SIT STILL: NOT AT ALL
2. NOT BEING ABLE TO STOP OR CONTROL WORRYING: NOT AT ALL
6. BECOMING EASILY ANNOYED OR IRRITABLE: NOT AT ALL
1. FEELING NERVOUS, ANXIOUS, OR ON EDGE: NOT AT ALL
3. WORRYING TOO MUCH ABOUT DIFFERENT THINGS: NOT AT ALL
GAD7 TOTAL SCORE: 0

## 2019-03-26 ASSESSMENT — PAIN SCALES - GENERAL: PAINLEVEL: NO PAIN (0)

## 2019-03-26 ASSESSMENT — MIFFLIN-ST. JEOR: SCORE: 1194.78

## 2019-03-26 ASSESSMENT — PATIENT HEALTH QUESTIONNAIRE - PHQ9
SUM OF ALL RESPONSES TO PHQ QUESTIONS 1-9: 0
5. POOR APPETITE OR OVEREATING: NOT AT ALL

## 2019-03-26 NOTE — NURSING NOTE
"Chief Complaint   Patient presents with     Physical       Initial /70 (BP Location: Left arm, Patient Position: Sitting, Cuff Size: Adult Regular)   Pulse 73   Temp 98.4  F (36.9  C) (Tympanic)   Ht 1.562 m (5' 1.5\")   Wt 68.9 kg (152 lb)   SpO2 98%   BMI 28.26 kg/m   Estimated body mass index is 28.26 kg/m  as calculated from the following:    Height as of this encounter: 1.562 m (5' 1.5\").    Weight as of this encounter: 68.9 kg (152 lb).  Medication Reconciliation: complete    Renuka Ansari LPN  "

## 2019-03-27 ENCOUNTER — TELEPHONE (OUTPATIENT)
Dept: FAMILY MEDICINE | Facility: OTHER | Age: 64
End: 2019-03-27

## 2019-03-27 ASSESSMENT — ANXIETY QUESTIONNAIRES: GAD7 TOTAL SCORE: 0

## 2019-03-27 NOTE — TELEPHONE ENCOUNTER
03/27/2019 - received PA request from [facility] for vagifem.  Submitted thru [facility].  Waiting for response.  Lazara Berry, [facility] Specialist.

## 2019-03-28 ENCOUNTER — TELEPHONE (OUTPATIENT)
Dept: FAMILY MEDICINE | Facility: OTHER | Age: 64
End: 2019-03-28

## 2019-03-28 DIAGNOSIS — Z01.419 WELL WOMAN EXAM: ICD-10-CM

## 2019-03-28 LAB
ALBUMIN SERPL-MCNC: 4.2 G/DL (ref 3.4–5)
ALBUMIN UR-MCNC: NEGATIVE MG/DL
ALP SERPL-CCNC: 58 U/L (ref 40–150)
ALT SERPL W P-5'-P-CCNC: 30 U/L (ref 0–50)
ANION GAP SERPL CALCULATED.3IONS-SCNC: 6 MMOL/L (ref 3–14)
APPEARANCE UR: CLEAR
AST SERPL W P-5'-P-CCNC: 21 U/L (ref 0–45)
BILIRUB SERPL-MCNC: 0.5 MG/DL (ref 0.2–1.3)
BILIRUB UR QL STRIP: NEGATIVE
BUN SERPL-MCNC: 10 MG/DL (ref 7–30)
CALCIUM SERPL-MCNC: 8.9 MG/DL (ref 8.5–10.1)
CHLORIDE SERPL-SCNC: 102 MMOL/L (ref 94–109)
CHOLEST SERPL-MCNC: 238 MG/DL
CO2 SERPL-SCNC: 27 MMOL/L (ref 20–32)
COLOR UR AUTO: YELLOW
CREAT SERPL-MCNC: 0.69 MG/DL (ref 0.52–1.04)
ERYTHROCYTE [DISTWIDTH] IN BLOOD BY AUTOMATED COUNT: 13.1 % (ref 10–15)
GFR SERPL CREATININE-BSD FRML MDRD: >90 ML/MIN/{1.73_M2}
GLUCOSE SERPL-MCNC: 100 MG/DL (ref 70–99)
GLUCOSE UR STRIP-MCNC: NEGATIVE MG/DL
HCT VFR BLD AUTO: 41.7 % (ref 35–47)
HDLC SERPL-MCNC: 81 MG/DL
HGB BLD-MCNC: 13.8 G/DL (ref 11.7–15.7)
HGB UR QL STRIP: ABNORMAL
KETONES UR STRIP-MCNC: NEGATIVE MG/DL
LDLC SERPL CALC-MCNC: 127 MG/DL
LEUKOCYTE ESTERASE UR QL STRIP: NEGATIVE
MCH RBC QN AUTO: 30.7 PG (ref 26.5–33)
MCHC RBC AUTO-ENTMCNC: 33.1 G/DL (ref 31.5–36.5)
MCV RBC AUTO: 93 FL (ref 78–100)
NITRATE UR QL: NEGATIVE
NON-SQ EPI CELLS #/AREA URNS LPF: ABNORMAL /LPF
NONHDLC SERPL-MCNC: 157 MG/DL
PH UR STRIP: 6.5 PH (ref 5–7)
PLATELET # BLD AUTO: 276 10E9/L (ref 150–450)
POTASSIUM SERPL-SCNC: 4.3 MMOL/L (ref 3.4–5.3)
PROT SERPL-MCNC: 7.5 G/DL (ref 6.8–8.8)
RBC # BLD AUTO: 4.49 10E12/L (ref 3.8–5.2)
RBC #/AREA URNS AUTO: ABNORMAL /HPF
SODIUM SERPL-SCNC: 135 MMOL/L (ref 133–144)
SOURCE: ABNORMAL
SP GR UR STRIP: <=1.005 (ref 1–1.03)
TRIGL SERPL-MCNC: 150 MG/DL
UROBILINOGEN UR STRIP-ACNC: 0.2 EU/DL (ref 0.2–1)
WBC # BLD AUTO: 6.6 10E9/L (ref 4–11)
WBC #/AREA URNS AUTO: ABNORMAL /HPF

## 2019-03-28 PROCEDURE — 80061 LIPID PANEL: CPT | Performed by: PHYSICIAN ASSISTANT

## 2019-03-28 PROCEDURE — 80053 COMPREHEN METABOLIC PANEL: CPT | Performed by: PHYSICIAN ASSISTANT

## 2019-03-28 PROCEDURE — 85027 COMPLETE CBC AUTOMATED: CPT | Performed by: PHYSICIAN ASSISTANT

## 2019-03-28 PROCEDURE — 36415 COLL VENOUS BLD VENIPUNCTURE: CPT | Performed by: PHYSICIAN ASSISTANT

## 2019-03-28 PROCEDURE — 81001 URINALYSIS AUTO W/SCOPE: CPT | Performed by: PHYSICIAN ASSISTANT

## 2019-03-28 NOTE — TELEPHONE ENCOUNTER
DENIAL - 03/28/2019 - received denial from [facility] for vagifem 10 mg.  Denial reason:  Patient should use the preferred bioequivalent generic product.  Pharmacy advised.  Documentation scanned to Epic.  Lazara Berry, [facility] Specialist.

## 2019-04-11 ENCOUNTER — ANCILLARY PROCEDURE (OUTPATIENT)
Dept: MAMMOGRAPHY | Facility: OTHER | Age: 64
End: 2019-04-11
Attending: PHYSICIAN ASSISTANT
Payer: COMMERCIAL

## 2019-04-11 DIAGNOSIS — Z12.31 VISIT FOR SCREENING MAMMOGRAM: ICD-10-CM

## 2019-04-11 PROCEDURE — 77063 BREAST TOMOSYNTHESIS BI: CPT | Mod: TC

## 2019-04-11 PROCEDURE — 77067 SCR MAMMO BI INCL CAD: CPT | Mod: TC

## 2019-09-19 ENCOUNTER — TELEPHONE (OUTPATIENT)
Dept: FAMILY MEDICINE | Facility: OTHER | Age: 64
End: 2019-09-19

## 2019-09-19 DIAGNOSIS — F51.01 PRIMARY INSOMNIA: Primary | ICD-10-CM

## 2019-09-19 NOTE — TELEPHONE ENCOUNTER
Pt calls, is taking a road trip soon, requesting order for Amitriptyline 25mg at [facility]  Seems to calm her stomach   Received from , GI in the past  Will make follow-up appt after the trip.  Pt thinks primary has also ordered in the past.    Please call pt.    Francy Capellan LPN

## 2020-01-13 ENCOUNTER — TELEPHONE (OUTPATIENT)
Dept: FAMILY MEDICINE | Facility: OTHER | Age: 65
End: 2020-01-13

## 2020-01-13 DIAGNOSIS — R79.89 ELEVATED TSH: ICD-10-CM

## 2020-01-13 DIAGNOSIS — R79.89 ELEVATED TSH: Primary | ICD-10-CM

## 2020-01-13 LAB
T4 FREE SERPL-MCNC: 0.85 NG/DL (ref 0.76–1.46)
TSH SERPL DL<=0.005 MIU/L-ACNC: 5.34 MU/L (ref 0.4–4)

## 2020-01-13 PROCEDURE — 84443 ASSAY THYROID STIM HORMONE: CPT | Performed by: PHYSICIAN ASSISTANT

## 2020-01-13 PROCEDURE — 84439 ASSAY OF FREE THYROXINE: CPT | Performed by: PHYSICIAN ASSISTANT

## 2020-01-13 PROCEDURE — 36415 COLL VENOUS BLD VENIPUNCTURE: CPT | Performed by: PHYSICIAN ASSISTANT

## 2020-01-13 NOTE — TELEPHONE ENCOUNTER
Pt's endocrinologist retired, , sent letter to provider to order thyroid labs for pt.    Please call pt.      Francy Capellan LPN

## 2020-03-10 ENCOUNTER — TRANSFERRED RECORDS (OUTPATIENT)
Dept: HEALTH INFORMATION MANAGEMENT | Facility: CLINIC | Age: 65
End: 2020-03-10

## 2020-07-31 ENCOUNTER — ANCILLARY PROCEDURE (OUTPATIENT)
Dept: MAMMOGRAPHY | Facility: OTHER | Age: 65
End: 2020-07-31
Attending: PHYSICIAN ASSISTANT
Payer: COMMERCIAL

## 2020-07-31 DIAGNOSIS — Z12.31 VISIT FOR SCREENING MAMMOGRAM: ICD-10-CM

## 2020-07-31 PROCEDURE — 77067 SCR MAMMO BI INCL CAD: CPT | Mod: TC

## 2020-07-31 PROCEDURE — 77063 BREAST TOMOSYNTHESIS BI: CPT | Mod: TC

## 2020-08-17 NOTE — PROGRESS NOTES
SUBJECTIVE:   CC: Laney Guzman is an 65 year old woman who presents for preventive health visit.     Healthy Habits:    Do you get at least three servings of calcium containing foods daily (dairy, green leafy vegetables, etc.)? yes    Amount of exercise or daily activities, outside of work: 7 day(s) per week    Problems taking medications regularly No    Medication side effects: No    Have you had an eye exam in the past two years? yes    Do you see a dentist twice per year? yes    Do you have sleep apnea, excessive snoring or daytime drowsiness?no          Today's PHQ-2 Score:   PHQ-2 ( 1999 Pfizer) 3/26/2019 11/21/2013   Q1: Little interest or pleasure in doing things 0 0   Q2: Feeling down, depressed or hopeless 0 0   PHQ-2 Score 0 0       Abuse: Current or Past(Physical, Sexual or Emotional)- No  Do you feel safe in your environment? Yes        Social History     Tobacco Use     Smoking status: Never Smoker     Smokeless tobacco: Never Used     Tobacco comment: no passive exposure   Substance Use Topics     Alcohol use: Yes     Comment: socially     If you drink alcohol do you typically have >3 drinks per day or >7 drinks per week? No                     Reviewed orders with patient.  Reviewed health maintenance and updated orders accordingly - No  Lab work is in process  Labs reviewed in EPIC  BP Readings from Last 3 Encounters:   08/19/20 102/60   03/26/19 110/70   03/23/18 100/80    Wt Readings from Last 3 Encounters:   08/19/20 68.9 kg (152 lb)   03/26/19 68.9 kg (152 lb)   03/23/18 71.8 kg (158 lb 3.2 oz)                  Patient Active Problem List   Diagnosis     ACP (advance care planning)     Other specified glaucoma     Irritable bowel syndrome with diarrhea     Atrophic vaginitis     Past Surgical History:   Procedure Laterality Date     COLONOSCOPY  3-     COLONOSCOPY N/A 5/15/2017    Procedure: COLONOSCOPY;  COLONOSCOPY WITH BIOPSIES;  Surgeon: David Vieira MD;  Location: [facility]      excision of ganglion cyst       phlebectomies x11 R leg,x12 L leg      Bilateral, varicose veins       Social History     Tobacco Use     Smoking status: Never Smoker     Smokeless tobacco: Never Used     Tobacco comment: no passive exposure   Substance Use Topics     Alcohol use: Yes     Comment: socially     Family History   Problem Relation Age of Onset     Cancer Father      Other - See Comments Father 82        Emphysema, cause of death     Arthritis Mother 62        Rheumatoid Arthritis, cause of death     Other - See Comments Sister         Stomach aneurysm         Current Outpatient Medications   Medication Sig Dispense Refill     amitriptyline (ELAVIL) 25 MG tablet Take 1 tablet (25 mg) by mouth At Bedtime 30 tablet 0     Cholecalciferol (VITAMIN D PO) Take 1 tablet by mouth daily       CRANBERRY Take 1 by mouth daily       diphenoxylate-atropine (LOMOTIL) 2.5-0.025 MG per tablet Take 2 tablets by mouth 4 times daily as needed for diarrhea 30 tablet 0     estradiol (VAGIFEM) 10 MCG TABS vaginal tablet Place 1 tablet (10 mcg) vaginally twice a week 24 tablet 3     fish oil-omega-3 fatty acids (FISH OIL) 1000 MG capsule Take 1 by oral route every day       Ginger, Zingiber officinalis, (GINGER ROOT) 550 MG CAPS Take 550 mg by mouth daily       L-FORMULA LYSINE HCL PO Take 1 tablet by mouth daily       Loperamide HCl (IMODIUM A-D PO) Take by mouth as needed       Multiple Vitamin (DAILY MULTIVITAMIN PO) Take 1 tablet by oral route every day with food       UNABLE TO FIND Take 1 tablet by mouth daily MEDICATION NAME: tumeric       UNKNOWN TO PATIENT Eye drops for Glaucoma. Name unknown. 1 drop to each eye daily       colestipol (COLESTID) 1 g tablet        Allergies   Allergen Reactions     Augmentin Cramps     Recent Labs   Lab Test 01/13/20  1457 03/28/19  0839 01/04/19  1324  03/14/18  0819 02/13/17  0903  02/05/16  0839   LDL  --  127*  --   --  137* 108*  --  110*   HDL  --  81  --   --  83 82  --  94    TRIG  --  150*  --   --  132 131  --  85   ALT  --  30  --   --  40  --   --  31   CR  --  0.69  --   --  0.64  --    < > 0.63   GFRESTIMATED  --  >90  --   --  >90  --    < > >90  Non  GFR Calc     GFRESTBLACK  --  >90  --   --  >90  --    < > >90   GFR Calc     POTASSIUM  --  4.3  --   --  4.0  --    < > 3.9   TSH 5.34*  --  3.99   < > 6.57* 2.61  --  3.72    < > = values in this interval not displayed.        Mammogram Screening: Patient over age 50, mutual decision to screen reflected in health maintenance.    Pertinent mammograms are reviewed under the imaging tab.  History of abnormal Pap smear:   Last 3 Pap and HPV Results:   PAP / HPV Latest Ref Rng & Units 3/12/2018 11/25/2014   PAP - NIL NIL   HPV 16 DNA NEG:Negative Negative -   HPV 18 DNA NEG:Negative Negative -   OTHER HR HPV NEG:Negative Negative -     PAP / HPV Latest Ref Rng & Units 3/12/2018 11/25/2014   PAP - NIL NIL   HPV 16 DNA NEG:Negative Negative -   HPV 18 DNA NEG:Negative Negative -   OTHER HR HPV NEG:Negative Negative -     Reviewed and updated as needed this visit by clinical staff  Tobacco  Allergies  Meds  Med Hx  Surg Hx  Fam Hx  Soc Hx        Reviewed and updated as needed this visit by Provider          Past Medical History:   Diagnosis Date     Glaucoma      Menopausal or female climacteric states 08/21/2000     Routine general medical examination at [facility] 08/18/2000      Past Surgical History:   Procedure Laterality Date     COLONOSCOPY  3-     COLONOSCOPY N/A 5/15/2017    Procedure: COLONOSCOPY;  COLONOSCOPY WITH BIOPSIES;  Surgeon: David Vieira MD;  Location: [facility]     excision of ganglion cyst       phlebectomies x11 R leg,x12 L leg      Bilateral, varicose veins     OB History   No obstetric history on file.       ROS:  CONSTITUTIONAL: NEGATIVE for fever, chills, change in weight  INTEGUMENTARU/SKIN: NEGATIVE for worrisome rashes, moles or lesions  EYES: NEGATIVE for  vision changes or irritation  ENT: NEGATIVE for ear, mouth and throat problems  RESP: NEGATIVE for significant cough or SOB  BREAST: NEGATIVE for masses, tenderness or discharge  CV: NEGATIVE for chest pain, palpitations or peripheral edema  GI: NEGATIVE for nausea, abdominal pain, heartburn, or change in bowel habits  : NEGATIVE for unusual urinary or vaginal symptoms. Periods are regular.  MUSCULOSKELETAL: NEGATIVE for significant arthralgias or myalgia  NEURO: NEGATIVE for weakness, dizziness or paresthesias  ENDOCRINE: NEGATIVE for temperature intolerance, skin/hair changes  HEME/ALLERGY/IMMUNE: NEGATIVE for bleeding problems  PSYCHIATRIC: NEGATIVE for changes in mood or affect    OBJECTIVE:   /60   Pulse 79   Temp 97.7  F (36.5  C)   Wt 68.9 kg (152 lb)   SpO2 99%   BMI 28.26 kg/m    EXAM:  GENERAL APPEARANCE: healthy, alert and no distress  EYES: Eyes grossly normal to inspection, PERRL and conjunctivae and sclerae normal  HENT: ear canals and TM's normal, nose and mouth without ulcers or lesions, oropharynx clear and oral mucous membranes moist  NECK: no adenopathy, no asymmetry, masses, or scars and thyroid normal to palpation  RESP: lungs clear to auscultation - no rales, rhonchi or wheezes  BREAST: normal without masses, tenderness or nipple discharge and no palpable axillary masses or adenopathy  CV: regular rate and rhythm, normal S1 S2, no S3 or S4, no murmur, click or rub, no peripheral edema and peripheral pulses strong  ABDOMEN: soft, nontender, no hepatosplenomegaly, no masses and bowel sounds normal  MS: no musculoskeletal defects are noted and gait is age appropriate without ataxia  SKIN: no suspicious lesions or rashes  NEURO: Normal strength and tone, sensory exam grossly normal, mentation intact and speech normal  PSYCH: mentation appears normal and affect normal/bright    Diagnostic Test Results:  Labs reviewed in Epic  No results found for this or any previous visit (from the  "past 24 hour(s)).    ASSESSMENT/PLAN:   1. Routine general medical examination at a health care facility  She has hx of glaucoma.  Is seeing her eye  Every 6 months.  She is seeing well. Has mild eye injection.  No issues with her bladder and TSH is managed by her Endocrine.   - Lipid Profile; Future  - Comprehensive metabolic panel; Future  - CBC with platelets differential; Future  - UA with Microscopic reflex to Culture; Future    COUNSELING:   Reviewed preventive health counseling, as reflected in patient instructions       Regular exercise       Healthy diet/nutrition       Vision screening       Hearing screening       Osteoporosis Prevention/Bone Health       ([place])menopause management       Advance Care Planning    Estimated body mass index is 28.26 kg/m  as calculated from the following:    Height as of 3/26/19: 1.562 m (5' 1.5\").    Weight as of this encounter: 68.9 kg (152 lb).         reports that she has never smoked. She has never used smokeless tobacco.      Counseling Resources:  ATP IV Guidelines  Pooled Cohorts Equation Calculator  Breast Cancer Risk Calculator  FRAX Risk Assessment  ICSI Preventive Guidelines  Dietary Guidelines for Americans, 2010  USDA's MyPlate  ASA Prophylaxis  Lung CA Screening    EZEQUIEL Luther  [facility] - [place]  "

## 2020-08-19 ENCOUNTER — OFFICE VISIT (OUTPATIENT)
Dept: FAMILY MEDICINE | Facility: OTHER | Age: 65
End: 2020-08-19
Attending: PHYSICIAN ASSISTANT
Payer: COMMERCIAL

## 2020-08-19 VITALS
BODY MASS INDEX: 28.26 KG/M2 | SYSTOLIC BLOOD PRESSURE: 102 MMHG | WEIGHT: 152 LBS | DIASTOLIC BLOOD PRESSURE: 60 MMHG | OXYGEN SATURATION: 99 % | TEMPERATURE: 97.7 F | HEART RATE: 79 BPM

## 2020-08-19 DIAGNOSIS — Z00.00 ROUTINE GENERAL MEDICAL EXAMINATION AT A HEALTH CARE FACILITY: Primary | ICD-10-CM

## 2020-08-19 DIAGNOSIS — N95.2 ATROPHIC VAGINITIS: ICD-10-CM

## 2020-08-19 DIAGNOSIS — Z86.19 H/O COLD SORES: ICD-10-CM

## 2020-08-19 PROCEDURE — 99396 PREV VISIT EST AGE 40-64: CPT | Performed by: PHYSICIAN ASSISTANT

## 2020-08-19 RX ORDER — MONTELUKAST SODIUM 4 MG/1
TABLET, CHEWABLE ORAL
COMMUNITY
Start: 2019-10-15 | End: 2021-09-15

## 2020-08-19 RX ORDER — ESTRADIOL 10 UG/1
10 INSERT VAGINAL
Qty: 24 TABLET | Refills: 3 | Status: SHIPPED | OUTPATIENT
Start: 2020-08-20 | End: 2021-09-15

## 2020-08-19 RX ORDER — VALACYCLOVIR HYDROCHLORIDE 1 G/1
2000 TABLET, FILM COATED ORAL 2 TIMES DAILY
Qty: 12 TABLET | Refills: 4 | Status: SHIPPED | OUTPATIENT
Start: 2020-08-19 | End: 2021-09-15

## 2020-08-19 ASSESSMENT — PAIN SCALES - GENERAL: PAINLEVEL: NO PAIN (0)

## 2020-08-19 NOTE — NURSING NOTE
"Chief Complaint   Patient presents with     Physical       Initial /60   Pulse 79   Temp 97.7  F (36.5  C)   Wt 68.9 kg (152 lb)   SpO2 99%   BMI 28.26 kg/m   Estimated body mass index is 28.26 kg/m  as calculated from the following:    Height as of 3/26/19: 1.562 m (5' 1.5\").    Weight as of this encounter: 68.9 kg (152 lb).  Medication Reconciliation: complete  Mitzi Gates LPN    "

## 2020-08-20 DIAGNOSIS — Z00.00 ROUTINE GENERAL MEDICAL EXAMINATION AT A HEALTH CARE FACILITY: ICD-10-CM

## 2020-08-20 LAB
ALBUMIN SERPL-MCNC: 3.8 G/DL (ref 3.4–5)
ALBUMIN UR-MCNC: NEGATIVE MG/DL
ALP SERPL-CCNC: 68 U/L (ref 40–150)
ALT SERPL W P-5'-P-CCNC: 30 U/L (ref 0–50)
ANION GAP SERPL CALCULATED.3IONS-SCNC: 3 MMOL/L (ref 3–14)
APPEARANCE UR: CLEAR
AST SERPL W P-5'-P-CCNC: 15 U/L (ref 0–45)
BACTERIA #/AREA URNS HPF: ABNORMAL /HPF
BASOPHILS # BLD AUTO: 0.1 10E9/L (ref 0–0.2)
BASOPHILS NFR BLD AUTO: 0.9 %
BILIRUB SERPL-MCNC: 0.6 MG/DL (ref 0.2–1.3)
BILIRUB UR QL STRIP: NEGATIVE
BUN SERPL-MCNC: 9 MG/DL (ref 7–30)
CALCIUM SERPL-MCNC: 9 MG/DL (ref 8.5–10.1)
CHLORIDE SERPL-SCNC: 105 MMOL/L (ref 94–109)
CHOLEST SERPL-MCNC: 247 MG/DL
CO2 SERPL-SCNC: 29 MMOL/L (ref 20–32)
COLOR UR AUTO: ABNORMAL
CREAT SERPL-MCNC: 0.7 MG/DL (ref 0.52–1.04)
DIFFERENTIAL METHOD BLD: NORMAL
EOSINOPHIL # BLD AUTO: 0.2 10E9/L (ref 0–0.7)
EOSINOPHIL NFR BLD AUTO: 2.1 %
ERYTHROCYTE [DISTWIDTH] IN BLOOD BY AUTOMATED COUNT: 13.2 % (ref 10–15)
GFR SERPL CREATININE-BSD FRML MDRD: >90 ML/MIN/{1.73_M2}
GLUCOSE SERPL-MCNC: 99 MG/DL (ref 70–99)
GLUCOSE UR STRIP-MCNC: NEGATIVE MG/DL
HCT VFR BLD AUTO: 40.5 % (ref 35–47)
HDLC SERPL-MCNC: 70 MG/DL
HGB BLD-MCNC: 13.3 G/DL (ref 11.7–15.7)
HGB UR QL STRIP: ABNORMAL
IMM GRANULOCYTES # BLD: 0 10E9/L (ref 0–0.4)
IMM GRANULOCYTES NFR BLD: 0.4 %
KETONES UR STRIP-MCNC: NEGATIVE MG/DL
LDLC SERPL CALC-MCNC: 140 MG/DL
LEUKOCYTE ESTERASE UR QL STRIP: NEGATIVE
LYMPHOCYTES # BLD AUTO: 3.3 10E9/L (ref 0.8–5.3)
LYMPHOCYTES NFR BLD AUTO: 44 %
MCH RBC QN AUTO: 30 PG (ref 26.5–33)
MCHC RBC AUTO-ENTMCNC: 32.8 G/DL (ref 31.5–36.5)
MCV RBC AUTO: 91 FL (ref 78–100)
MONOCYTES # BLD AUTO: 0.6 10E9/L (ref 0–1.3)
MONOCYTES NFR BLD AUTO: 8.6 %
NEUTROPHILS # BLD AUTO: 3.3 10E9/L (ref 1.6–8.3)
NEUTROPHILS NFR BLD AUTO: 44 %
NITRATE UR QL: NEGATIVE
NONHDLC SERPL-MCNC: 177 MG/DL
NRBC # BLD AUTO: 0 10*3/UL
NRBC BLD AUTO-RTO: 0 /100
PH UR STRIP: 7 PH (ref 4.7–8)
PLATELET # BLD AUTO: 258 10E9/L (ref 150–450)
POTASSIUM SERPL-SCNC: 4.1 MMOL/L (ref 3.4–5.3)
PROT SERPL-MCNC: 7.3 G/DL (ref 6.8–8.8)
RBC # BLD AUTO: 4.44 10E12/L (ref 3.8–5.2)
RBC #/AREA URNS AUTO: 1 /HPF (ref 0–2)
SODIUM SERPL-SCNC: 137 MMOL/L (ref 133–144)
SOURCE: ABNORMAL
SP GR UR STRIP: 1 (ref 1–1.03)
SQUAMOUS #/AREA URNS AUTO: 1 /HPF (ref 0–1)
TRIGL SERPL-MCNC: 183 MG/DL
UROBILINOGEN UR STRIP-MCNC: NORMAL MG/DL (ref 0–2)
WBC # BLD AUTO: 7.5 10E9/L (ref 4–11)
WBC #/AREA URNS AUTO: 1 /HPF (ref 0–5)

## 2020-08-20 PROCEDURE — 81001 URINALYSIS AUTO W/SCOPE: CPT | Performed by: PHYSICIAN ASSISTANT

## 2020-08-20 PROCEDURE — 80061 LIPID PANEL: CPT | Performed by: PHYSICIAN ASSISTANT

## 2020-08-20 PROCEDURE — 80053 COMPREHEN METABOLIC PANEL: CPT | Performed by: PHYSICIAN ASSISTANT

## 2020-08-20 PROCEDURE — 85025 COMPLETE CBC W/AUTO DIFF WBC: CPT | Performed by: PHYSICIAN ASSISTANT

## 2020-08-20 PROCEDURE — 36415 COLL VENOUS BLD VENIPUNCTURE: CPT | Performed by: PHYSICIAN ASSISTANT

## 2020-11-08 ENCOUNTER — HOSPITAL ENCOUNTER (EMERGENCY)
Facility: HOSPITAL | Age: 65
Discharge: HOME | End: 2020-11-08
Attending: NURSE PRACTITIONER | Admitting: NURSE PRACTITIONER
Payer: COMMERCIAL

## 2020-11-08 VITALS
SYSTOLIC BLOOD PRESSURE: 141 MMHG | RESPIRATION RATE: 16 BRPM | OXYGEN SATURATION: 98 % | DIASTOLIC BLOOD PRESSURE: 89 MMHG | HEART RATE: 93 BPM | TEMPERATURE: 98.1 F

## 2020-11-08 DIAGNOSIS — Z86.19 HX OF LYME DISEASE: ICD-10-CM

## 2020-11-08 DIAGNOSIS — S40.861A TICK BITE OF RIGHT AXILLARY REGION, INITIAL ENCOUNTER: ICD-10-CM
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
## 2020-11-08 DIAGNOSIS — S40.869A: Primary | ICD-10-CM
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
## 2020-11-08 PROCEDURE — 99213 OFFICE O/P EST LOW 20 MIN: CPT | Performed by: NURSE PRACTITIONER

## 2020-11-08 PROCEDURE — G0463 HOSPITAL OUTPT CLINIC VISIT: HCPCS

## 2020-11-08 RX ORDER — DOXYCYCLINE HYCLATE 100 MG
200 TABLET ORAL ONCE
Qty: 2 TABLET | Refills: 0 | Status: SHIPPED | OUTPATIENT
Start: 2020-11-08 | End: 2020-11-08

## 2020-11-08 ASSESSMENT — ENCOUNTER SYMPTOMS: WOUND: 1

## 2020-11-08 NOTE — DISCHARGE INSTRUCTIONS
Take antibiotic as prescribed for 1 dose.     Continue to observe for signs of worsening redness, swelling or abnormal discharge and return to emergency department.    Follow up with your doctor as needed.     Return to emergency department for worsening or concerning symptoms.

## 2020-11-08 NOTE — ED TRIAGE NOTES
Pt is here with c/o tick bite noticed this am   But is unsure of how long it was attached   Pt deneis any muscle aches, nausea, vomiting, fevers

## 2020-11-08 NOTE — ED AVS SNAPSHOT
[facility]  [address] [address]  Phone: [phone]                                    Laney Guzman   MRN: [id number]    Department: [facility]   Date of Visit: 11/8/2020           After Visit Summary Signature Page    I have received my discharge instructions, and my questions have been answered. I have discussed any challenges I see with this plan with the nurse or doctor.    ..........................................................................................................................................  Patient/Patient Representative Signature      ..........................................................................................................................................  Patient Representative Print Name and Relationship to Patient    ..................................................               ................................................  Date                                   Time    ..........................................................................................................................................  Reviewed by Signature/Title    ...................................................              ..............................................  Date                                               Time          22EPIC Rev 08/18

## 2020-11-08 NOTE — ED PROVIDER NOTES
History     Chief Complaint   Patient presents with     Insect Bite     tic bite rt shoulder     HPI  Laney Guzman is a 65 year old female who presents to urgent care for concerns of a tick bite.  She noticed a deer tick embedded to her right axillary this morning and her  pulled of the tick.  She reports a history of Lyme disease.  Denies fever, body aches, vomiting or diarrhea.    Allergies:  Allergies   Allergen Reactions     Augmentin Cramps       Problem List:    Patient Active Problem List    Diagnosis Date Noted     Atrophic vaginitis 03/26/2019     Priority: Medium     Other specified glaucoma 03/12/2018     Priority: Medium     Jefry Sipola in [place] cares for her.        Irritable bowel syndrome with diarrhea 03/12/2018     Priority: Medium     ACP (advance care planning) 02/10/2017     Priority: Medium     Advance Care Planning 2/10/2017: ACP Review of Chart / Resources Provided:  Reviewed chart for advance care plan.  Laney Guzman has been provided information and resources to begin or update their advance care plan.  Added by Trish Dupree  Advance Care Planning 9/26/2016: ACP Review of Chart / Resources Provided:  Reviewed chart for advance care plan.  Laney Guzman has been provided information and resources to begin or update their advance care plan.  Added by Trish Dupree                Past Medical History:    Past Medical History:   Diagnosis Date     Glaucoma      Menopausal or female climacteric states 08/21/2000     Routine general medical examination at [facility] 08/18/2000       Past Surgical History:    Past Surgical History:   Procedure Laterality Date     COLONOSCOPY  3-     COLONOSCOPY N/A 5/15/2017    Procedure: COLONOSCOPY;  COLONOSCOPY WITH BIOPSIES;  Surgeon: David Vieira MD;  Location: [facility]     excision of ganglion cyst       phlebectomies x11 R leg,x12 L leg      Bilateral, varicose veins       Family History:    Family History    Problem Relation Age of Onset     Cancer Father      Other - See Comments Father 82        Emphysema, cause of death     Arthritis Mother 62        Rheumatoid Arthritis, cause of death     Other - See Comments Sister         Stomach aneurysm       Social History:  Marital Status:   [2]  Social History     Tobacco Use     Smoking status: Never Smoker     Smokeless tobacco: Never Used     Tobacco comment: no passive exposure   Substance Use Topics     Alcohol use: Yes     Comment: socially     Drug use: No        Medications:         amitriptyline (ELAVIL) 25 MG tablet       Cholecalciferol (VITAMIN D PO)       colestipol (COLESTID) 1 g tablet       CRANBERRY       diphenoxylate-atropine (LOMOTIL) 2.5-0.025 MG per tablet       doxycycline hyclate (VIBRA-TABS) 100 MG tablet       estradiol (VAGIFEM) 10 MCG TABS vaginal tablet       fish oil-omega-3 fatty acids (FISH OIL) 1000 MG capsule       Concepcion, Zingiber officinalis, (CONCEPCION ROOT) 550 MG CAPS       L-FORMULA LYSINE HCL PO       Loperamide HCl (IMODIUM A-D PO)       Multiple Vitamin (DAILY MULTIVITAMIN PO)       UNABLE TO FIND       UNKNOWN TO PATIENT       valACYclovir (VALTREX) 1000 mg tablet          Review of Systems   Skin: Positive for wound.   All other systems reviewed and are negative.      Physical Exam   BP: 141/89  Pulse: 93  Temp: 98.1  F (36.7  C)  Resp: 16  SpO2: 98 %      Physical Exam  Vitals signs and nursing note reviewed.   Constitutional:       Appearance: Normal appearance. She is not ill-appearing or toxic-appearing.   HENT:      Head: Normocephalic and atraumatic.   Eyes:      Pupils: Pupils are equal, round, and reactive to light.   Neck:      Musculoskeletal: Neck supple.   Cardiovascular:      Rate and Rhythm: Normal rate.   Pulmonary:      Effort: Pulmonary effort is normal.   Musculoskeletal: Normal range of motion.   Skin:     General: Skin is warm and dry.      Capillary Refill: Capillary refill takes less than 2 seconds.       Findings: Erythema and wound present.             Comments: Wound noted to right axillary with mild erythema measuring about half an inch around the wound.  No fluctuance or discharge.   Neurological:      Mental Status: She is alert and oriented to person, place, and time.         ED Course        Procedures               No results found for this or any previous visit (from the past 24 hour(s)).    Medications - No data to display    Assessments & Plan (with Medical Decision Making)     I have reviewed the nursing notes.    I have reviewed the findings, diagnosis, plan and need for follow up with the patient.  65-year-old female that noticed a tick embedded to right axillary earlier today unsure of how long it was embedded.  Spouse removed the tick earlier this morning.  Patient is asymptomatic.  Vital signs are stable.  She does have a wound to right axillary with mild erythema of about a half an inch around the wound.  No fluctuance or erythema.  Doxycycline prophylactic dose as prescribed for possible Lyme disease.  Advised patient to continue observing the site for increased redness, swelling or discharge and return to emergency department.  Follow-up with PCP as needed.  Return to ED/UC for worsening or concerning symptoms.  Patient verbalized understanding.    This document was prepared using a combination of typing and voice generated software.  While every attempt was made for accuracy, spelling and grammatical errors may exist.    New Prescriptions    DOXYCYCLINE HYCLATE (VIBRA-TABS) 100 MG TABLET    Take 2 tablets (200 mg) by mouth once for 1 dose       Final diagnoses:   Tick bite of axillary region   Hx of Lyme disease       11/8/2020   [facility]     Mpofu, Prudence, CNP  11/08/20 1207

## 2021-01-18 ENCOUNTER — TRANSFERRED RECORDS (OUTPATIENT)
Dept: HEALTH INFORMATION MANAGEMENT | Facility: CLINIC | Age: 66
End: 2021-01-18

## 2021-03-31 DIAGNOSIS — E03.9 HYPOTHYROIDISM: Primary | ICD-10-CM

## 2021-04-06 DIAGNOSIS — E03.9 HYPOTHYROIDISM: ICD-10-CM

## 2021-04-06 LAB
T4 FREE SERPL-MCNC: 0.74 NG/DL (ref 0.76–1.46)
TSH SERPL DL<=0.005 MIU/L-ACNC: 5.46 MU/L (ref 0.4–4)

## 2021-04-06 PROCEDURE — 36415 COLL VENOUS BLD VENIPUNCTURE: CPT | Performed by: INTERNAL MEDICINE

## 2021-04-06 PROCEDURE — 84443 ASSAY THYROID STIM HORMONE: CPT | Performed by: INTERNAL MEDICINE

## 2021-04-06 PROCEDURE — 84439 ASSAY OF FREE THYROXINE: CPT | Performed by: INTERNAL MEDICINE

## 2021-06-03 NOTE — PROGRESS NOTES
SUBJECTIVE:                                                    Laney Guzman is a 61 year old female who presents to clinic today for the following health issues:      Diarrhea      Duration: ongoing issue    Description:       Consistency of stool: varies       Blood in stool: no        Number of loose stools past 24 hours: ongoing issues    Intensity:  moderate    Accompanying signs and symptoms:       Fever: no        Nausea/vomitting: no        Abdominal pain: no        Weight loss: no     History (recent antibiotics or travel/ill contacts/med changes/testing done): has seen Gastro in the past    Precipitating or alleviating factors: Imodium PRN    Therapies tried and outcome: as above    Wondering about getting Rx for Amitriptyline to help relax stomach muscles. Brother in law said this helped him.             Problem list and histories reviewed & adjusted, as indicated.  Additional history: as documented    Patient Active Problem List   Diagnosis     ACP (advance care planning)     Past Surgical History   Procedure Laterality Date     Phlebectomies x11 r leg,x12 l leg       Bilateral, varicose veins     Colonoscopy  3-     Excision of ganglion cyst         Social History   Substance Use Topics     Smoking status: Never Smoker      Smokeless tobacco: Not on file      Comment: no passive exposure     Alcohol Use: Yes      Comment: socially     Family History   Problem Relation Age of Onset     CANCER Father      Other - See Comments Father 82     Emphysema, cause of death     Arthritis Mother 62     Rheumatoid Arthritis, cause of death     Other - See Comments Sister      Stomach aneurysm         Allergies   Allergen Reactions     Augmentin Cramps     BP Readings from Last 3 Encounters:   02/10/17 108/66   09/26/16 116/68   02/11/16 117/70    Wt Readings from Last 3 Encounters:   02/10/17 154 lb (69.854 kg)   09/26/16 143 lb (64.864 kg)   02/11/16 142 lb (64.411 kg)                  Problem list,  She can try Metformin extended release or Glucophage XR.   Same strength.   "Medication list, Allergies, and Medical/Social/Surgical histories reviewed in [facility] and updated as appropriate.    SUBJECTIVE:    Laney Guzman is a 61 year old female who presents for general examination today.    Menopausal? Yes, started age 44. No hot flashes.  Feels better now. Occasional vaginal dryness and irritation. Uses Vagifem.    Self Breast exams? No self exam and no hx of breast cancer.   Last cholesterol: one year.    Servings of calcium/day: Paleo  diet with good leafy greens.  [place] is recommended.    Exercise: regular.   Stress: internal and self inflicted due to her anxiety disorder.    Immunizations up to date (Tetanus)? Zoster vaccine. Flu given today.   Fasting Status: no  Family history of Colon cancer? None.  Colon screening in May of 2017. Vieira.   Abnormal paps in past? None.   Risk factors for osteoporosis (white/SE marce, low BMI, h/o fx after age 40, steroids)? 2016.  Good dietary sources. No one in family.   Concerns: nervous all the time.             Past Medical History, surgical history, medications and allergies were reviewed and updated in electronic medical record.     ROS: No TIA's or unusual headaches,, has jaw clenching and tension,  no dysphagia.  No prolonged cough. No dyspnea or chest pain on exertion.  No abdominal pain, change in bowel habits, black or bloody stools.  No urinary tract symptoms.  No new or unusual musculoskeletal symptoms.  No menses, no abnormal vaginal bleeding, discharge or unexpected pelvic pain. No new breast lumps, breast pain or nipple discharge.      OBJECTIVE:  /66 mmHg  Pulse 90  Temp(Src) 98.4  F (36.9  C) (Tympanic)  Resp 16  Ht 5' 4.5\" (1.638 m)  Wt 154 lb (69.854 kg)  BMI 26.04 kg/m2  SpO2 98%  General:  Healthy appearing individual in no apparent distress.    HEENT:  Normocephalic, atraumatic head.  Eyelids, conjunctiva, sclera normal. PERRL.  Fundal exam normal.  Tympanic membranes shiny without retraction.  Canals " unremarkable.  Hearing grossly intact.  No abnormality of the nose or sinuses noted.  Normal oropharynx  Neck:  Supple, no lymphadenopathy noted.  No thyroid enlargement or thyroid nodules appreciated.   Respiratory:  Lungs clear to auscultation.  Good air exchange noted.   Cardiovascular:  Rhythm regular, normal S1 and S2.  No murmur, gallop or clicks noted.   No edema noted in lower extremities.  Peripheral pulses are normal.   Abdomen:  Soft, nontender, bowel sounds present in all four quadrants. No rigidity or guarding.  No hepatosplenomegaly noted.   Musculoskeletal:  Gait normal.  5/5 motor strength and full ROM bilateral upper and lower extremities.   Neurological:  Deep tendon reflexes 2+/4 and symmetrical.    Skin:  Normal without any suspicious lesions.   Breasts:  Breasts symmetric without masses, no nipple discharge, no axillary lymphadopathy.  I discussed the importance of self breast exam with patient and showed her how to perform them.       ASSESSMENT/PLAN:    1. Well woman exam  She is not needing pap and is doing well.   No vaginal concerns.  Mammogram ordered. DEXA one year old.  Good intake of calcium.  Immunizations are addressed.  Lipids and TSH also addressed.   Poor sleep and that is addressed.  Colon screening is scheduled now for May of this year in [place].  That was the first available for us here with GI specialist.     - Lipid Profile; Future  - TSH; Future    2. ACP (advance care planning)  Given.     3. Need for prophylactic vaccination and inoculation against influenza  She is given script for injection.   - HC FLU VAC PRESRV FREE QUAD SPLIT VIR 3+YRS IM  - Vaccine Administration, Initial [60706]    4. Primary insomnia  Try this and might also help her cramping and abd pain.  She will let us know if helpful.     - amitriptyline (ELAVIL) 10 MG tablet; One by mouth at bedtime  Dispense: 90 tablet; Refill: 0    5. Atrophic vaginitis  Chronic and cream does seem to help her.  Refill ok.    - estradiol (VAGIFEM) 10 MCG TABS vaginal tablet; Place 1 tablet (10 mcg) vaginally twice a week Insert 1 tablet (10mcg) by vaginal route 2 times every week  Dispense: 8 tablet; Refill: 12    6. Need for shingles vaccine  As above.   - zoster vaccine live, PF, (ZOSTAVAX) injection; Inject 0.65 mLs Subcutaneous once for 1 dose  Dispense: 0.65 mL; Refill: 0    7. Encounter for screening mammogram for breast cancer  No concerning findings on exam.   Mammogram for screening.   - MA Screening Digital Bilateral; Future  - MA Screening Digital Bilateral                Injectable Influenza Immunization Documentation    1.  Is the person to be vaccinated sick today? no  2. Does the person to be vaccinated have an allergy to eggs or to a component of the vaccine?  No    3. Has the person to be vaccinated today ever had a serious reaction to influenza vaccine in the past?  No    4. Has the person to be vaccinated ever had Guillain-[place] syndrome?  No     Form completed by Trish Dupree LPN

## 2021-06-16 ENCOUNTER — TRANSFERRED RECORDS (OUTPATIENT)
Dept: HEALTH INFORMATION MANAGEMENT | Facility: CLINIC | Age: 66
End: 2021-06-16

## 2021-06-17 DIAGNOSIS — E03.8 SUBCLINICAL HYPOTHYROIDISM: Primary | ICD-10-CM

## 2021-06-23 DIAGNOSIS — E03.8 SUBCLINICAL HYPOTHYROIDISM: ICD-10-CM

## 2021-06-23 LAB
T4 FREE SERPL-MCNC: 0.86 NG/DL (ref 0.76–1.46)
TSH SERPL DL<=0.005 MIU/L-ACNC: 2.45 MU/L (ref 0.4–4)

## 2021-06-23 PROCEDURE — 84439 ASSAY OF FREE THYROXINE: CPT | Performed by: INTERNAL MEDICINE

## 2021-06-23 PROCEDURE — 84443 ASSAY THYROID STIM HORMONE: CPT | Performed by: INTERNAL MEDICINE

## 2021-06-23 PROCEDURE — 84481 FREE ASSAY (FT-3): CPT | Performed by: INTERNAL MEDICINE

## 2021-06-23 PROCEDURE — 36415 COLL VENOUS BLD VENIPUNCTURE: CPT | Performed by: INTERNAL MEDICINE

## 2021-06-24 LAB — T3FREE SERPL-MCNC: 2.6 PG/ML (ref 2.3–4.2)

## 2021-07-28 DIAGNOSIS — Z12.31 VISIT FOR SCREENING MAMMOGRAM: Primary | ICD-10-CM

## 2021-08-05 ENCOUNTER — ANCILLARY PROCEDURE (OUTPATIENT)
Dept: MAMMOGRAPHY | Facility: OTHER | Age: 66
End: 2021-08-05
Attending: PHYSICIAN ASSISTANT
Payer: COMMERCIAL

## 2021-08-05 ENCOUNTER — TELEPHONE (OUTPATIENT)
Dept: MAMMOGRAPHY | Facility: OTHER | Age: 66
End: 2021-08-05

## 2021-08-05 DIAGNOSIS — Z12.31 VISIT FOR SCREENING MAMMOGRAM: ICD-10-CM

## 2021-08-05 PROCEDURE — 77063 BREAST TOMOSYNTHESIS BI: CPT | Mod: TC | Performed by: RADIOLOGY

## 2021-08-05 PROCEDURE — 77067 SCR MAMMO BI INCL CAD: CPT | Mod: TC | Performed by: RADIOLOGY

## 2021-09-14 NOTE — PROGRESS NOTES
SUBJECTIVE:   CC: Laney Guzman is an 66 year old woman who presents for preventive health visit.       Patient has been advised of split billing requirements and indicates understanding: Yes  HPI  She is here for her routine exam.           Today's PHQ-2 Score:   PHQ-2 ( 1999 Pfizer) 9/15/2021   Q1: Little interest or pleasure in doing things 0   Q2: Feeling down, depressed or hopeless 0   PHQ-2 Score 0       Abuse: Current or Past (Physical, Sexual or Emotional) - No  Do you feel safe in your environment? Yes        Social History     Tobacco Use     Smoking status: Never Smoker     Smokeless tobacco: Never Used     Tobacco comment: no passive exposure   Substance Use Topics     Alcohol use: Yes     Comment: socially           Reviewed orders with patient.  Reviewed health maintenance and updated orders accordingly - Yes  Lab work is in process  Labs reviewed in EPIC  BP Readings from Last 3 Encounters:   09/15/21 120/80   11/08/20 141/89   08/19/20 102/60    Wt Readings from Last 3 Encounters:   09/15/21 72.6 kg (160 lb)   08/19/20 68.9 kg (152 lb)   03/26/19 68.9 kg (152 lb)                  Patient Active Problem List   Diagnosis     ACP (advance care planning)     Other specified glaucoma     Irritable bowel syndrome with diarrhea     Atrophic vaginitis     Past Surgical History:   Procedure Laterality Date     COLONOSCOPY  3-     COLONOSCOPY N/A 5/15/2017    Procedure: COLONOSCOPY;  COLONOSCOPY WITH BIOPSIES;  Surgeon: David Vieira MD;  Location: [facility]     excision of ganglion cyst       phlebectomies x11 R leg,x12 L leg      Bilateral, varicose veins       Social History     Tobacco Use     Smoking status: Never Smoker     Smokeless tobacco: Never Used     Tobacco comment: no passive exposure   Substance Use Topics     Alcohol use: Yes     Comment: socially     Family History   Problem Relation Age of Onset     Cancer Father      Other - See Comments Father 82        Emphysema, cause  of death     Arthritis Mother 62        Rheumatoid Arthritis, cause of death     Other - See Comments Sister         Stomach aneurysm         Current Outpatient Medications   Medication Sig Dispense Refill     amitriptyline (ELAVIL) 25 MG tablet Take 1 tablet (25 mg) by mouth At Bedtime 30 tablet 0     Cholecalciferol (VITAMIN D PO) Take 1 tablet by mouth daily       CRANBERRY Take 1 by mouth daily       diphenoxylate-atropine (LOMOTIL) 2.5-0.025 MG per tablet Take 2 tablets by mouth 4 times daily as needed for diarrhea 30 tablet 0     [START ON 9/16/2021] estradiol (VAGIFEM) 10 MCG TABS vaginal tablet Place 1 tablet (10 mcg) vaginally twice a week 24 tablet 3     fish oil-omega-3 fatty acids (FISH OIL) 1000 MG capsule Take 1 by oral route every day       Ginger, Zingiber officinalis, (GINGER ROOT) 550 MG CAPS Take 550 mg by mouth daily       L-FORMULA LYSINE HCL PO Take 1 tablet by mouth daily       Loperamide HCl (IMODIUM A-D PO) Take by mouth as needed       Multiple Vitamin (DAILY MULTIVITAMIN PO) Take 1 tablet by oral route every day with food       UNABLE TO FIND Take 1 tablet by mouth daily MEDICATION NAME: tumeric       UNKNOWN TO PATIENT Eye drops for Glaucoma. Name unknown. 1 drop to each eye daily       valACYclovir (VALTREX) 1000 mg tablet Take 2 tablets (2,000 mg) by mouth 2 times daily as needed (when she has cold sore for 1 dose) 2 tablet 0     Allergies   Allergen Reactions     Augmentin Cramps     Recent Labs   Lab Test 06/23/21  1356 04/06/21  1551 08/20/20  0800 01/13/20  1457 03/28/19  0839 03/23/18  1439 03/14/18  0819   LDL  --   --  140*  --  127*  --  137*   HDL  --   --  70  --  81  --  83   TRIG  --   --  183*  --  150*  --  132   ALT  --   --  30  --  30  --  40   CR  --   --  0.70  --  0.69  --  0.64   GFRESTIMATED  --   --  >90  --  >90  --  >90   GFRESTBLACK  --   --  >90  --  >90  --  >90   POTASSIUM  --   --  4.1  --  4.3  --  4.0   TSH 2.45 5.46*  --    < >  --    < > 6.57*    < > =  values in this interval not displayed.        Breast Cancer Screening:  Any new diagnosis of family breast, ovarian, or bowel cancer? No    FHS-7: No flowsheet data found.    Mammogram Screening: Recommended mammography every 1-2 years with patient discussion and risk factor consideration  Pertinent mammograms are reviewed under the imaging tab.    History of abnormal Pap smear:   Last 3 Pap and HPV Results:   PAP / HPV Latest Ref Rng & Units 3/12/2018 11/25/2014   PAP (Historical) - NIL NIL   HPV16 NEG:Negative Negative -   HPV18 NEG:Negative Negative -   HRHPV NEG:Negative Negative -     PAP / HPV Latest Ref Rng & Units 3/12/2018 11/25/2014   PAP (Historical) - NIL NIL   HPV16 NEG:Negative Negative -   HPV18 NEG:Negative Negative -   HRHPV NEG:Negative Negative -     Reviewed and updated as needed this visit by clinical staff  Tobacco  Allergies  Meds   Med Hx  Surg Hx  Fam Hx          Reviewed and updated as needed this visit by Provider    Meds               Past Medical History:   Diagnosis Date     Glaucoma      Menopausal or female climacteric states 08/21/2000     Routine general medical examination at [facility] 08/18/2000      Past Surgical History:   Procedure Laterality Date     COLONOSCOPY  3-     COLONOSCOPY N/A 5/15/2017    Procedure: COLONOSCOPY;  COLONOSCOPY WITH BIOPSIES;  Surgeon: David Vieira MD;  Location: [facility]     excision of ganglion cyst       phlebectomies x11 R leg,x12 L leg      Bilateral, varicose veins     OB History   No obstetric history on file.       Review of Systems  CONSTITUTIONAL: NEGATIVE for fever, chills, change in weight  INTEGUMENTARY/SKIN: NEGATIVE for worrisome rashes, moles or lesions  EYES: NEGATIVE for vision changes or irritation  ENT: NEGATIVE for ear, mouth and throat problems  RESP: NEGATIVE for significant cough or SOB  BREAST: NEGATIVE for masses, tenderness or discharge  CV: NEGATIVE for chest pain, palpitations or peripheral edema  GI: has  "IBSD and  Is working with Dr. Vieira.  Taking Imodium,    : NEGATIVE for unusual urinary or vaginal symptoms. No vaginal bleeding.  MUSCULOSKELETAL:joint stiffness of both knees. , muscle cramps-nocturnal and myalgia  NEURO: NEGATIVE for weakness, dizziness or paresthesias  PSYCHIATRIC: NEGATIVE for changes in mood or affect      OBJECTIVE:   /80 (BP Location: Left arm, Patient Position: Sitting, Cuff Size: Adult Regular)   Pulse 88   Temp 98.1  F (36.7  C) (Tympanic)   Ht 1.588 m (5' 2.5\")   Wt 72.6 kg (160 lb)   SpO2 100%   BMI 28.80 kg/m    Physical Exam  GENERAL: healthy, alert and no distress  EYES: Eyes grossly normal to inspection, PERRL and conjunctivae and sclerae normal  HENT: ear canals and TM's normal, nose and mouth without ulcers or lesions  NECK: no adenopathy, no asymmetry, masses, or scars and thyroid normal to palpation  RESP: lungs clear to auscultation - no rales, rhonchi or wheezes  BREAST: normal without masses, tenderness or nipple discharge and no palpable axillary masses or adenopathy  CV: regular rate and rhythm, normal S1 S2, no S3 or S4, no murmur, click or rub, no peripheral edema and peripheral pulses strong  ABDOMEN: bloated scattered tympany.   MS: no gross musculoskeletal defects noted, no edema  SKIN: no suspicious lesions or rashes  NEURO: Normal strength and tone, mentation intact and speech normal    Diagnostic Test Results:  Labs reviewed in Epic  No results found for this or any previous visit (from the past 24 hour(s)).    ASSESSMENT/PLAN:   (Z00.00) Routine general medical examination at a health care facility  (primary encounter diagnosis)  Comment: She is going  To  Be given labs in a future date  And is fasting.  Has been doing ok with Covid and is immunized.   Plan: Lipid Profile (Chol, Trig, HDL, LDL calc), CBC         with platelets and differential, Comprehensive         metabolic panel (BMP + Alb, Alk Phos, ALT, AST,        Total. Bili, TP)        " "    (N95.2) Atrophic vaginitis  Comment: refill of her vagifem.   Plan: estradiol (VAGIFEM) 10 MCG TABS vaginal tablet            (Z86.19) H/O cold sores  Comment: she is given a refill. See us back as recommended.    Plan: valACYclovir (VALTREX) 1000 mg tablet            (Z78.0) Asymptomatic postmenopausal estrogen deficiency  Comment: getting her DEXA reviewed requirements for vit D and Calcium.   Has IBSD so  Going to see how  Her GI system will handle this supplement.   Plan: DX Hip/Pelvis/Spine            (Z11.4) Screening for HIV (human immunodeficiency virus)  Comment: willing to do this  Plan: HIV Antigen Antibody Combo              Patient has been advised of split billing requirements and indicates understanding: Yes  COUNSELING:  Reviewed preventive health counseling, as reflected in patient instructions       Regular exercise       Healthy diet/nutrition       Vision screening       Hearing screening       Immunizations    Vaccinated for: TDAP and Zoster             Aspirin prophylaxis       Osteoporosis prevention/bone health       Advance Care Planning    Estimated body mass index is 28.8 kg/m  as calculated from the following:    Height as of this encounter: 1.588 m (5' 2.5\").    Weight as of this encounter: 72.6 kg (160 lb).        She reports that she has never smoked. She has never used smokeless tobacco.      Counseling Resources:  ATP IV Guidelines  Pooled Cohorts Equation Calculator  Breast Cancer Risk Calculator  BRCA-Related Cancer Risk Assessment: FHS-7 Tool  FRAX Risk Assessment  ICSI Preventive Guidelines  Dietary Guidelines for Americans, 2010  USDA's MyPlate  ASA Prophylaxis  Lung CA Screening    EZEQUIEL Luther  [facility] - [place]  "

## 2021-09-15 ENCOUNTER — OFFICE VISIT (OUTPATIENT)
Dept: FAMILY MEDICINE | Facility: OTHER | Age: 66
End: 2021-09-15
Attending: PHYSICIAN ASSISTANT
Payer: COMMERCIAL

## 2021-09-15 VITALS
HEIGHT: 63 IN | TEMPERATURE: 98.1 F | HEART RATE: 88 BPM | DIASTOLIC BLOOD PRESSURE: 80 MMHG | SYSTOLIC BLOOD PRESSURE: 120 MMHG | OXYGEN SATURATION: 100 % | BODY MASS INDEX: 28.35 KG/M2 | WEIGHT: 160 LBS

## 2021-09-15 DIAGNOSIS — Z00.00 ROUTINE GENERAL MEDICAL EXAMINATION AT A HEALTH CARE FACILITY: Primary | ICD-10-CM

## 2021-09-15 DIAGNOSIS — Z23 NEED FOR ZOSTER VACCINATION: ICD-10-CM

## 2021-09-15 DIAGNOSIS — N95.2 ATROPHIC VAGINITIS: ICD-10-CM

## 2021-09-15 DIAGNOSIS — Z86.19 H/O COLD SORES: ICD-10-CM

## 2021-09-15 DIAGNOSIS — Z78.0 ASYMPTOMATIC POSTMENOPAUSAL ESTROGEN DEFICIENCY: ICD-10-CM

## 2021-09-15 DIAGNOSIS — Z11.4 SCREENING FOR HIV (HUMAN IMMUNODEFICIENCY VIRUS): ICD-10-CM

## 2021-09-15 DIAGNOSIS — Z23 NEED FOR TDAP VACCINATION: ICD-10-CM

## 2021-09-15 PROCEDURE — 90471 IMMUNIZATION ADMIN: CPT | Performed by: PHYSICIAN ASSISTANT

## 2021-09-15 PROCEDURE — 90750 HZV VACC RECOMBINANT IM: CPT | Performed by: PHYSICIAN ASSISTANT

## 2021-09-15 PROCEDURE — 99386 PREV VISIT NEW AGE 40-64: CPT | Mod: 25 | Performed by: PHYSICIAN ASSISTANT

## 2021-09-15 PROCEDURE — 90715 TDAP VACCINE 7 YRS/> IM: CPT | Performed by: PHYSICIAN ASSISTANT

## 2021-09-15 PROCEDURE — 90472 IMMUNIZATION ADMIN EACH ADD: CPT | Performed by: PHYSICIAN ASSISTANT

## 2021-09-15 RX ORDER — ESTRADIOL 10 UG/1
10 INSERT VAGINAL
Qty: 24 TABLET | Refills: 3 | Status: SHIPPED | OUTPATIENT
Start: 2021-09-16 | End: 2022-12-08

## 2021-09-15 RX ORDER — VALACYCLOVIR HYDROCHLORIDE 1 G/1
2000 TABLET, FILM COATED ORAL 2 TIMES DAILY PRN
Qty: 2 TABLET | Refills: 0 | Status: SHIPPED | OUTPATIENT
Start: 2021-09-15 | End: 2022-06-01

## 2021-09-15 ASSESSMENT — ANXIETY QUESTIONNAIRES
GAD7 TOTAL SCORE: 0
3. WORRYING TOO MUCH ABOUT DIFFERENT THINGS: NOT AT ALL
7. FEELING AFRAID AS IF SOMETHING AWFUL MIGHT HAPPEN: NOT AT ALL
2. NOT BEING ABLE TO STOP OR CONTROL WORRYING: NOT AT ALL
4. TROUBLE RELAXING: NOT AT ALL
5. BEING SO RESTLESS THAT IT IS HARD TO SIT STILL: NOT AT ALL
1. FEELING NERVOUS, ANXIOUS, OR ON EDGE: NOT AT ALL
6. BECOMING EASILY ANNOYED OR IRRITABLE: NOT AT ALL

## 2021-09-15 ASSESSMENT — PATIENT HEALTH QUESTIONNAIRE - PHQ9: SUM OF ALL RESPONSES TO PHQ QUESTIONS 1-9: 0

## 2021-09-15 ASSESSMENT — PAIN SCALES - GENERAL: PAINLEVEL: NO PAIN (0)

## 2021-09-15 ASSESSMENT — MIFFLIN-ST. JEOR: SCORE: 1236.95

## 2021-09-15 NOTE — NURSING NOTE
"Chief Complaint   Patient presents with     Physical       Initial Blood Pressure 120/80 (BP Location: Left arm, Patient Position: Sitting, Cuff Size: Adult Regular)   Pulse 88   Temperature 98.1  F (36.7  C) (Tympanic)   Height 1.588 m (5' 2.5\")   Weight 72.6 kg (160 lb)   Oxygen Saturation 100%   Body Mass Index 28.80 kg/m   Estimated body mass index is 28.8 kg/m  as calculated from the following:    Height as of this encounter: 1.588 m (5' 2.5\").    Weight as of this encounter: 72.6 kg (160 lb).  Medication Reconciliation: complete  Taylor Cano LPN  "

## 2021-09-16 ASSESSMENT — ANXIETY QUESTIONNAIRES: GAD7 TOTAL SCORE: 0

## 2021-09-17 ENCOUNTER — LAB (OUTPATIENT)
Dept: LAB | Facility: OTHER | Age: 66
End: 2021-09-17
Payer: COMMERCIAL

## 2021-09-17 DIAGNOSIS — Z11.4 SCREENING FOR HIV (HUMAN IMMUNODEFICIENCY VIRUS): ICD-10-CM

## 2021-09-17 DIAGNOSIS — Z00.00 ROUTINE GENERAL MEDICAL EXAMINATION AT A HEALTH CARE FACILITY: ICD-10-CM

## 2021-09-17 LAB
ALBUMIN SERPL-MCNC: 3.7 G/DL (ref 3.4–5)
ALP SERPL-CCNC: 69 U/L (ref 40–150)
ALT SERPL W P-5'-P-CCNC: 27 U/L (ref 0–50)
ANION GAP SERPL CALCULATED.3IONS-SCNC: 4 MMOL/L (ref 3–14)
AST SERPL W P-5'-P-CCNC: 20 U/L (ref 0–45)
BASOPHILS # BLD AUTO: 0 10E3/UL (ref 0–0.2)
BASOPHILS NFR BLD AUTO: 0 %
BILIRUB SERPL-MCNC: 0.7 MG/DL (ref 0.2–1.3)
BUN SERPL-MCNC: 7 MG/DL (ref 7–30)
CALCIUM SERPL-MCNC: 8.7 MG/DL (ref 8.5–10.1)
CHLORIDE BLD-SCNC: 101 MMOL/L (ref 94–109)
CHOLEST SERPL-MCNC: 235 MG/DL
CO2 SERPL-SCNC: 28 MMOL/L (ref 20–32)
CREAT SERPL-MCNC: 0.73 MG/DL (ref 0.52–1.04)
EOSINOPHIL # BLD AUTO: 0.1 10E3/UL (ref 0–0.7)
EOSINOPHIL NFR BLD AUTO: 1 %
ERYTHROCYTE [DISTWIDTH] IN BLOOD BY AUTOMATED COUNT: 12.8 % (ref 10–15)
FASTING STATUS PATIENT QL REPORTED: YES
GFR SERPL CREATININE-BSD FRML MDRD: 87 ML/MIN/1.73M2
GLUCOSE BLD-MCNC: 103 MG/DL (ref 70–99)
HCT VFR BLD AUTO: 41.6 % (ref 35–47)
HDLC SERPL-MCNC: 76 MG/DL
HGB BLD-MCNC: 13.7 G/DL (ref 11.7–15.7)
IMM GRANULOCYTES # BLD: 0.1 10E3/UL
IMM GRANULOCYTES NFR BLD: 1 %
LDLC SERPL CALC-MCNC: 133 MG/DL
LYMPHOCYTES # BLD AUTO: 2.1 10E3/UL (ref 0.8–5.3)
LYMPHOCYTES NFR BLD AUTO: 19 %
MCH RBC QN AUTO: 30.6 PG (ref 26.5–33)
MCHC RBC AUTO-ENTMCNC: 32.9 G/DL (ref 31.5–36.5)
MCV RBC AUTO: 93 FL (ref 78–100)
MONOCYTES # BLD AUTO: 0.9 10E3/UL (ref 0–1.3)
MONOCYTES NFR BLD AUTO: 8 %
NEUTROPHILS # BLD AUTO: 8 10E3/UL (ref 1.6–8.3)
NEUTROPHILS NFR BLD AUTO: 71 %
NONHDLC SERPL-MCNC: 159 MG/DL
NRBC # BLD AUTO: 0 10E3/UL
NRBC BLD AUTO-RTO: 0 /100
PLATELET # BLD AUTO: 241 10E3/UL (ref 150–450)
POTASSIUM BLD-SCNC: 3.8 MMOL/L (ref 3.4–5.3)
PROT SERPL-MCNC: 7.7 G/DL (ref 6.8–8.8)
RBC # BLD AUTO: 4.48 10E6/UL (ref 3.8–5.2)
SODIUM SERPL-SCNC: 133 MMOL/L (ref 133–144)
TRIGL SERPL-MCNC: 132 MG/DL
WBC # BLD AUTO: 11.1 10E3/UL (ref 4–11)

## 2021-09-17 PROCEDURE — 87389 HIV-1 AG W/HIV-1&-2 AB AG IA: CPT

## 2021-09-17 PROCEDURE — 36415 COLL VENOUS BLD VENIPUNCTURE: CPT

## 2021-09-17 PROCEDURE — 85025 COMPLETE CBC W/AUTO DIFF WBC: CPT

## 2021-09-17 PROCEDURE — 80061 LIPID PANEL: CPT

## 2021-09-17 PROCEDURE — 80053 COMPREHEN METABOLIC PANEL: CPT

## 2021-09-20 LAB — HIV 1+2 AB+HIV1 P24 AG SERPL QL IA: NONREACTIVE

## 2021-09-22 ENCOUNTER — HOSPITAL ENCOUNTER (OUTPATIENT)
Dept: BONE DENSITY | Facility: HOSPITAL | Age: 66
Discharge: HOME | End: 2021-09-22
Attending: PHYSICIAN ASSISTANT | Admitting: PHYSICIAN ASSISTANT
Payer: COMMERCIAL

## 2021-09-22 DIAGNOSIS — Z78.0 ASYMPTOMATIC POSTMENOPAUSAL ESTROGEN DEFICIENCY: ICD-10-CM

## 2021-09-22 PROCEDURE — 77080 DXA BONE DENSITY AXIAL: CPT

## 2021-10-03 ENCOUNTER — HEALTH MAINTENANCE LETTER (OUTPATIENT)
Age: 66
End: 2021-10-03

## 2021-11-01 NOTE — PROGRESS NOTES
"  Assessment & Plan     Need for prophylactic vaccination and inoculation against influenza  She is given injection  - INFLUENZA QUAD, RECOMBINANT, P-FREE (RIV4) (FLUBLOK)    Inflamed seborrheic keratosis  Treated her upper thorax. 13 lesions. Most along bra line breast and shoulders.   - DESTRUCT BENIGN LESION, UP TO 14  - bacitracin 500 UNIT/GM external ointment; Apply topically 2 times daily    Review of external notes as documented elsewhere in note  Ordering of each unique test  Prescription drug management  10 minutes spent on the date of the encounter doing chart review        See Patient Instructions    Return if symptoms worsen or fail to improve, for Follow up.    EZEQUIEL Luther  [facility] - JESSICA Davison is a 66 year old who presents for the following health issues     HPI     Concern - Actinic Keratosis   Onset: years   Description:  Keratosis on back and stomach   Intensity: moderate  Progression of Symptoms:  same  Accompanying Signs & Symptoms:   Previous history of similar problem: yes , has had them frozen in the past   Precipitating factors:        Worsened by:   Alleviating factors:        Improved by:   Therapies tried and outcome: cryotherapy         Review of Systems   Needing lesions treated very large thick and bleeding along braline.       Objective    /62 (BP Location: Left arm, Patient Position: Chair, Cuff Size: Adult Regular)   Pulse 88   Ht 1.588 m (5' 2.5\")   Wt 71.9 kg (158 lb 9.6 oz)   BMI 28.55 kg/m    Body mass index is 28.55 kg/m .  Physical Exam   GENERAL: healthy, alert and no distress  RESP: lungs clear to auscultation - no rales, rhonchi or wheezes  MS: no gross musculoskeletal defects noted, no edema  SKIN: cryo freeze and thaw x3 to each lesion per protocol.     Lab on 09/17/2021   Component Date Value Ref Range Status     HIV Antigen Antibody Combo 09/17/2021 Nonreactive  Nonreactive Final    HIV-1 p24 Ag & HIV-1/HIV-2 Ab Not " Detected     Cholesterol 09/17/2021 235* <200 mg/dL Final     Triglycerides 09/17/2021 132  <150 mg/dL Final     Direct Measure HDL 09/17/2021 76  >=50 mg/dL Final     LDL Cholesterol Calculated 09/17/2021 133* <=100 mg/dL Final     Non HDL Cholesterol 09/17/2021 159* <130 mg/dL Final     Patient Fasting > 8hrs? 09/17/2021 Yes   Final     Sodium 09/17/2021 133  133 - 144 mmol/L Final     Potassium 09/17/2021 3.8  3.4 - 5.3 mmol/L Final     Chloride 09/17/2021 101  94 - 109 mmol/L Final     Carbon Dioxide (CO2) 09/17/2021 28  20 - 32 mmol/L Final     Anion Gap 09/17/2021 4  3 - 14 mmol/L Final     Urea Nitrogen 09/17/2021 7  7 - 30 mg/dL Final     Creatinine 09/17/2021 0.73  0.52 - 1.04 mg/dL Final     Calcium 09/17/2021 8.7  8.5 - 10.1 mg/dL Final     Glucose 09/17/2021 103* 70 - 99 mg/dL Final     Alkaline Phosphatase 09/17/2021 69  40 - 150 U/L Final     AST 09/17/2021 20  0 - 45 U/L Final     ALT 09/17/2021 27  0 - 50 U/L Final     Protein Total 09/17/2021 7.7  6.8 - 8.8 g/dL Final     Albumin 09/17/2021 3.7  3.4 - 5.0 g/dL Final     Bilirubin Total 09/17/2021 0.7  0.2 - 1.3 mg/dL Final     GFR Estimate 09/17/2021 87  >60 mL/min/1.73m2 Final    As of July 11, 2021, eGFR is calculated by the CKD-EPI creatinine equation, without race adjustment. eGFR can be influenced by muscle mass, exercise, and diet. The reported eGFR is an estimation only and is only applicable if the renal function is stable.     WBC Count 09/17/2021 11.1* 4.0 - 11.0 10e3/uL Final     RBC Count 09/17/2021 4.48  3.80 - 5.20 10e6/uL Final     Hemoglobin 09/17/2021 13.7  11.7 - 15.7 g/dL Final     Hematocrit 09/17/2021 41.6  35.0 - 47.0 % Final     MCV 09/17/2021 93  78 - 100 fL Final     MCH 09/17/2021 30.6  26.5 - 33.0 pg Final     MCHC 09/17/2021 32.9  31.5 - 36.5 g/dL Final     RDW 09/17/2021 12.8  10.0 - 15.0 % Final     Platelet Count 09/17/2021 241  150 - 450 10e3/uL Final     % Neutrophils 09/17/2021 71  % Final     % Lymphocytes  09/17/2021 19  % Final     % Monocytes 09/17/2021 8  % Final     % Eosinophils 09/17/2021 1  % Final     % Basophils 09/17/2021 0  % Final     % Immature Granulocytes 09/17/2021 1  % Final     NRBCs per 100 WBC 09/17/2021 0  <1 /100 Final     Absolute Neutrophils 09/17/2021 8.0  1.6 - 8.3 10e3/uL Final     Absolute Lymphocytes 09/17/2021 2.1  0.8 - 5.3 10e3/uL Final     Absolute Monocytes 09/17/2021 0.9  0.0 - 1.3 10e3/uL Final     Absolute Eosinophils 09/17/2021 0.1  0.0 - 0.7 10e3/uL Final     Absolute Basophils 09/17/2021 0.0  0.0 - 0.2 10e3/uL Final     Absolute Immature Granulocytes 09/17/2021 0.1* <=0.0 10e3/uL Final     Absolute NRBCs 09/17/2021 0.0  10e3/uL Final

## 2021-11-04 ENCOUNTER — OFFICE VISIT (OUTPATIENT)
Dept: FAMILY MEDICINE | Facility: OTHER | Age: 66
End: 2021-11-04
Attending: PHYSICIAN ASSISTANT
Payer: COMMERCIAL

## 2021-11-04 VITALS
WEIGHT: 158.6 LBS | DIASTOLIC BLOOD PRESSURE: 62 MMHG | BODY MASS INDEX: 28.1 KG/M2 | HEART RATE: 88 BPM | HEIGHT: 63 IN | SYSTOLIC BLOOD PRESSURE: 114 MMHG

## 2021-11-04 DIAGNOSIS — L82.0 INFLAMED SEBORRHEIC KERATOSIS: ICD-10-CM

## 2021-11-04 DIAGNOSIS — Z23 NEED FOR PROPHYLACTIC VACCINATION AND INOCULATION AGAINST INFLUENZA: Primary | ICD-10-CM

## 2021-11-04 PROCEDURE — 90682 RIV4 VACC RECOMBINANT DNA IM: CPT | Performed by: PHYSICIAN ASSISTANT

## 2021-11-04 PROCEDURE — 90471 IMMUNIZATION ADMIN: CPT | Performed by: PHYSICIAN ASSISTANT

## 2021-11-04 PROCEDURE — 17110 DESTRUCTION B9 LES UP TO 14: CPT | Performed by: PHYSICIAN ASSISTANT

## 2021-11-04 RX ORDER — BACITRACIN ZINC 500 [USP'U]/G
OINTMENT TOPICAL 2 TIMES DAILY
Qty: 113.4 G | Refills: 3 | Status: SHIPPED | OUTPATIENT
Start: 2021-11-04 | End: 2022-12-08 | Stop reason: ALTCHOICE

## 2021-11-04 ASSESSMENT — MIFFLIN-ST. JEOR: SCORE: 1230.59

## 2021-11-04 ASSESSMENT — PAIN SCALES - GENERAL: PAINLEVEL: NO PAIN (0)

## 2021-11-04 NOTE — PATIENT INSTRUCTIONS
Thank you for choosing [facility].   I have office hours 8:00 am to 4:30 pm on Monday's, Wednesday's, Thursday's and Friday's. My nurse and I are out of the office every Tuesday.    Following your visit, when your labs and diagnostic testing have returned, I will review then and you will be contacted by my nurse.  If you are on My Chart, you can also view results there.    For refills, notify your pharmacy regarding what you need and the pharmacy will generate a refill request. Do not call my nurse as she is unable to process refill request. Please plan ahead and allow 3-5 days for refill requests.    You will generally receive a reminder call the day prior to your appointment.  If you cannot attend your appointment, please cancel your appointment with as much notice as possible.  If there is a pattern of failure to present for your appointments, I cannot provide consistent, meaningful, ongoing care for you. It is very important to me that you come in for your care, so we can best assist you with your health care needs.    IMPORTANT:  Please note that it is my standard of practice to NOT participate in prescribing ongoing requested Narcotic Analgesic therapy, and/or participate in the prescribing of other controlled substances.  My nurse and I am happy to assist you with the process of referral for alternative pain management as needed, and other treatment modalities including but not limited to:  Physical Therapy, Physical Medicine and Rehab, Counseling, Chiropractic Care, Orthopedic Care, and non-narcotic medication management.     In the event that you need to be seen for emergent concerns and I am out of office,  please see one of my colleagues for acute concerns.  You may also present to  or ER.  I appreciate the opportunity to serve you and look forward to supporting your healthcare needs in the future. Please contact me with any questions or concerns that you may  have.    Sincerely,      Lazara Tate RN, PA-C

## 2021-11-04 NOTE — NURSING NOTE
"Chief Complaint   Patient presents with     actinic Keratrosis       Initial There were no vitals taken for this visit. Estimated body mass index is 28.8 kg/m  as calculated from the following:    Height as of 9/15/21: 1.588 m (5' 2.5\").    Weight as of 9/15/21: 72.6 kg (160 lb).  Medication Reconciliation: complete  Nidia Calero LPN  "

## 2021-11-17 NOTE — IP AVS SNAPSHOT
MRN:[id number]                      After Visit Summary   5/15/2017    Laney Guzman    MRN: [id number]           Thank you!     Thank you for choosing [facility] for your care. Our goal is always to provide you with excellent care. Hearing back from our patients is one way we can continue to improve our services. Please take a few minutes to complete the written survey that you may receive in the mail after you visit with us. Thank you!        Patient Information     Date Of Birth          1955        About your hospital stay     You were admitted on:  May 15, 2017 You last received care in the:  [facility]    You were discharged on:  May 15, 2017       Who to Call     For medical emergencies, please call 911.  For non-urgent questions about your medical care, please call your primary care provider or clinic, [phone]  For questions related to your surgery, please call your surgery clinic        Attending Provider     Provider Specialty    David Vieira MD Gastroenterology       Primary Care Provider Office Phone # Fax #    EZEQUIEL Denson [phone]       [address] [address]        Further instructions from your care team           INSTRUCTIONS AFTER COLONOSCOPY    WHEN YOU ARE BACK HOME:    Plan to rest for an hour or two after you get home.    You may have some cramping or pressure until you pass gas.    You may resume your regular medications.    Eat a small, light meal at first, and then gradually return to normal meal sizes.  If you had a polyp removed:    Slight bleeding may occur.  You may have a slight blood stain on the toilet paper after a bowel movement.    To lessen the chance of bleeding, avoid heavy exercise for ONE WEEK.  This includes heavy lifting, vigorous sport activities, and heavy physical labor.  You may resume your normal sexual activity.      Avoid aspirin or aspirin products if  Addended by: DEMIAN HERNÁNDEZ on: 11/17/2021 07:11 AM     Modules accepted: Orders     "instructed by your doctor.    WHAT TO WATCH FOR:  Problems rarely occur after the exam; however, it is important for you to watch for early signs of possible problems.  If you have     Unusual pain in your abdomen    Nausea and vomiting that persists    Excessive bleeding    Black or bloody bowel movements    Fever or temperature above 100.6 F  Please call your doctor ([facility] [phone]) or go to the nearest hospital emergency room.    Post-Anesthesia Patient Instructions    IMMEDIATELY FOLLOWING SURGERY:  Do not drive or operate machinery for the first twenty four hours after surgery.  Do not make any important decisions for twenty four hours after surgery or while taking narcotic pain medications or sedatives.  If you develop intractable nausea and vomiting or a severe headache please notify your doctor immediately.    FOLLOW-UP:  Please make an appointment with your surgeon as instructed. You do not need to follow up with anesthesia unless specifically instructed to do so.    WOUND CARE INSTRUCTIONS (if applicable):  Keep a dry clean dressing on the anesthesia/puncture wound site if there is drainage.  Once the wound has quit draining you may leave it open to air.  Generally you should leave the bandage intact for twenty four hours unless there is drainage.  If the epidural site drains for more than 36-48 hours please call the anesthesia department.    QUESTIONS?:  Please feel free to call your physician or the hospital  if you have any questions, and they will be happy to assist you.       Pending Results     No orders found from 5/13/2017 to 5/16/2017.            Admission Information     Date & Time Provider Department Dept. Phone    5/15/2017 David Vieira MD [facility] Preop/Phase [phone]      Your Vitals Were     Blood Pressure Pulse Temperature Respirations Height Weight    144/96 112 97.5  F (36.4  C) (Oral) 16 1.702 m (5' 7\") 62.1 kg (137 lb)    Pulse Oximetry BMI (Body Mass Index) "                97% 21.46 kg/m2          Caregivers Information     Caregivers gives you secure access to your electronic health record. If you see a primary care provider, you can also send messages to your care team and make appointments. If you have questions, please call your primary care clinic.  If you do not have a primary care provider, please call [phone] and they will assist you.        Care EveryWhere ID     This is your Care EveryWhere ID. This could be used by other organizations to access your [facility] medical records  [id number]           Review of your medicines      UNREVIEWED medicines. Ask your doctor about these medicines        Dose / Directions    CRANBERRY        Take 1 by mouth daily   Refills:  0       DAILY MULTIVITAMIN PO        Take 1 tablet by oral route every day with food   Refills:  0       diphenoxylate-atropine 2.5-0.025 MG per tablet   Commonly known as:  LOMOTIL   Used for:  Functional diarrhea        Dose:  2 tablet   Take 2 tablets by mouth 4 times daily as needed for diarrhea   Quantity:  30 tablet   Refills:  0       estradiol 10 MCG Tabs vaginal tablet   Commonly known as:  VAGIFEM   Used for:  Atrophic vaginitis        Dose:  10 mcg   Place 1 tablet (10 mcg) vaginally twice a week Insert 1 tablet (10mcg) by vaginal route 2 times every week   Quantity:  8 tablet   Refills:  12       fish oil-omega-3 fatty acids 1000 MG capsule        Take 1 by oral route every day   Refills:  0       ginger root 550 MG Caps capsule        Dose:  550 mg   Take 550 mg by mouth daily   Refills:  0       IMODIUM A-D PO        Take by mouth as needed   Refills:  0       L-FORMULA LYSINE HCL PO        Dose:  1 tablet   Take 1 tablet by mouth daily   Refills:  0       UNABLE TO FIND        Dose:  1 tablet   Take 1 tablet by mouth daily MEDICATION NAME: tumeric   Refills:  0       UNKNOWN TO PATIENT        Eye drops for Glaucoma. Name unknown. 1 drop to each eye daily   Refills:  0       VITAMIN D PO    Used for:  Routine gynecological examination        Dose:  1 tablet   Take 1 tablet by mouth daily   Refills:  0         CONTINUE these medicines which may have CHANGED, or have new prescriptions. If we are uncertain of the size of tablets/capsules you have at home, strength may be listed as something that might have changed.        Dose / Directions    amitriptyline 25 MG tablet   Commonly known as:  ELAVIL   This may have changed:  medication strength   Used for:  Primary insomnia        One by mouth at bedtime   Quantity:  90 tablet   Refills:  1            Where to get your medicines      Some of these will need a paper prescription and others can be bought over the counter. Ask your nurse if you have questions.     Bring a paper prescription for each of these medications     amitriptyline 25 MG tablet                Protect others around you: Learn how to safely use, store and throw away your medicines at www.disposemymeds.org.             Medication List: This is a list of all your medications and when to take them. Check marks below indicate your daily home schedule. Keep this list as a reference.      Medications           Morning Afternoon Evening Bedtime As Needed    amitriptyline 25 MG tablet   Commonly known as:  ELAVIL   One by mouth at bedtime                                CRANBERRY   Take 1 by mouth daily                                DAILY MULTIVITAMIN PO   Take 1 tablet by oral route every day with food                                diphenoxylate-atropine 2.5-0.025 MG per tablet   Commonly known as:  LOMOTIL   Take 2 tablets by mouth 4 times daily as needed for diarrhea                                estradiol 10 MCG Tabs vaginal tablet   Commonly known as:  VAGIFEM   Place 1 tablet (10 mcg) vaginally twice a week Insert 1 tablet (10mcg) by vaginal route 2 times every week                                fish oil-omega-3 fatty acids 1000 MG capsule   Take 1 by oral route every day                                 ginger root 550 MG Caps capsule   Take 550 mg by mouth daily                                IMODIUM A-D PO   Take by mouth as needed                                L-FORMULA LYSINE HCL PO   Take 1 tablet by mouth daily                                UNABLE TO FIND   Take 1 tablet by mouth daily MEDICATION NAME: tumeric                                UNKNOWN TO PATIENT   Eye drops for Glaucoma. Name unknown. 1 drop to each eye daily                                VITAMIN D PO   Take 1 tablet by mouth daily

## 2022-01-21 ENCOUNTER — TRANSFERRED RECORDS (OUTPATIENT)
Dept: HEALTH INFORMATION MANAGEMENT | Facility: CLINIC | Age: 67
End: 2022-01-21

## 2022-04-21 ENCOUNTER — NURSE TRIAGE (OUTPATIENT)
Dept: FAMILY MEDICINE | Facility: OTHER | Age: 67
End: 2022-04-21
Payer: COMMERCIAL

## 2022-04-21 DIAGNOSIS — B37.31 YEAST INFECTION OF THE VAGINA: Primary | ICD-10-CM

## 2022-04-21 NOTE — TELEPHONE ENCOUNTER
Call from patient requesting prescription for yeast infection.     Patient had a recent root canal, was on antibiotics- now developing symptoms of yeast infection.     Symptoms to include vaginal itching.     [facility]: [facility]

## 2022-04-22 NOTE — TELEPHONE ENCOUNTER
Patient called clinic this am looking for status of prescription. Writer sent message high priority to provider.

## 2022-04-22 NOTE — TELEPHONE ENCOUNTER
Writer spoke with primary nurse who stated she would have Lazara address by the end of the day. Attempted to call patient to inform. No answer.

## 2022-04-25 NOTE — TELEPHONE ENCOUNTER
4/25/2022 8:33 AM  Patient called again. Pt denies trying OTC monistat encouraged pt to try this option for a vaginal yeast infection. Diflucan is still pended as pt requested last week. Please advise. Thank you

## 2022-05-12 RX ORDER — FLUCONAZOLE 150 MG/1
150 TABLET ORAL ONCE
Qty: 1 TABLET | Refills: 0 | Status: SHIPPED | OUTPATIENT
Start: 2022-05-12 | End: 2022-05-12

## 2022-05-16 DIAGNOSIS — N76.0 VAGINITIS AND VULVOVAGINITIS: Primary | ICD-10-CM

## 2022-05-16 RX ORDER — FLUCONAZOLE 150 MG/1
150 TABLET ORAL ONCE
Qty: 1 TABLET | Refills: 0 | Status: SHIPPED | OUTPATIENT
Start: 2022-05-16 | End: 2022-05-16

## 2022-05-16 NOTE — TELEPHONE ENCOUNTER
Diflucan       Last Written Prescription Date:  Request   Last Fill Quantity: 1,   # refills: 0  Last Office Visit: 11/4/21  Future Office visit:

## 2022-05-31 DIAGNOSIS — Z86.19 H/O COLD SORES: ICD-10-CM

## 2022-06-01 RX ORDER — VALACYCLOVIR HYDROCHLORIDE 1 G/1
TABLET, FILM COATED ORAL
Qty: 4 TABLET | Refills: 1 | Status: SHIPPED | OUTPATIENT
Start: 2022-06-01 | End: 2022-12-08 | Stop reason: ALTCHOICE

## 2022-09-04 ENCOUNTER — HEALTH MAINTENANCE LETTER (OUTPATIENT)
Age: 67
End: 2022-09-04

## 2022-09-09 ENCOUNTER — TELEPHONE (OUTPATIENT)
Dept: MAMMOGRAPHY | Facility: OTHER | Age: 67
End: 2022-09-09

## 2022-09-09 ENCOUNTER — ANCILLARY PROCEDURE (OUTPATIENT)
Dept: MAMMOGRAPHY | Facility: OTHER | Age: 67
End: 2022-09-09
Attending: PHYSICIAN ASSISTANT
Payer: COMMERCIAL

## 2022-09-09 DIAGNOSIS — Z12.31 VISIT FOR SCREENING MAMMOGRAM: ICD-10-CM

## 2022-09-09 PROCEDURE — 77067 SCR MAMMO BI INCL CAD: CPT | Mod: TC

## 2022-09-15 ENCOUNTER — NURSE TRIAGE (OUTPATIENT)
Dept: FAMILY MEDICINE | Facility: OTHER | Age: 67
End: 2022-09-15

## 2022-09-15 NOTE — TELEPHONE ENCOUNTER
"Please call patient and let her know if if she can get squeezed in either today or tomorrow with provider.  Scheduled her with Issa on Monday, but she would like to be seen sooner if possible.    Reason for Disposition    Patient wants to be seen    Answer Assessment - Initial Assessment Questions  1. LOCATION: \"Which ear is involved?\"      Right ear  2. ONSET: \"When did the ear start hurting\"       One month ago  3. SEVERITY: \"How bad is the pain?\"  (Scale 1-10; mild, moderate or severe)    - MILD (1-3): doesn't interfere with normal activities     - MODERATE (4-7): interferes with normal activities or awakens from sleep     - SEVERE (8-10): excruciating pain, unable to do any normal activities       Comes and goes  Sometimes it is a 7/10  3/10 now  4. URI SYMPTOMS: \"Do you have a runny nose or cough?\"      no  5. FEVER: \"Do you have a fever?\" If Yes, ask: \"What is your temperature, how was it measured, and when did it start?\"      no  6. CAUSE: \"Have you been swimming recently?\", \"How often do you use Q-TIPS?\", \"Have you had any recent air travel or scuba diving?\"      no  7. OTHER SYMPTOMS: \"Do you have any other symptoms?\" (e.g., headache, stiff neck, dizziness, vomiting, runny nose, decreased hearing)      Sometimes ringing in her ear  8. PREGNANCY: \"Is there any chance you are pregnant?\" \"When was your last menstrual period?\"      no    Protocols used: EARACHE-A-OH      "

## 2022-09-16 ENCOUNTER — OFFICE VISIT (OUTPATIENT)
Dept: FAMILY MEDICINE | Facility: OTHER | Age: 67
End: 2022-09-16
Attending: PHYSICIAN ASSISTANT
Payer: COMMERCIAL

## 2022-09-16 VITALS
OXYGEN SATURATION: 94 % | BODY MASS INDEX: 29.09 KG/M2 | RESPIRATION RATE: 16 BRPM | DIASTOLIC BLOOD PRESSURE: 92 MMHG | TEMPERATURE: 97.6 F | SYSTOLIC BLOOD PRESSURE: 156 MMHG | WEIGHT: 161.6 LBS | HEART RATE: 110 BPM

## 2022-09-16 DIAGNOSIS — J31.0 CHRONIC RHINITIS: ICD-10-CM

## 2022-09-16 DIAGNOSIS — H92.01 RIGHT EAR PAIN: Primary | ICD-10-CM

## 2022-09-16 DIAGNOSIS — J01.90 ACUTE SINUSITIS WITH SYMPTOMS > 10 DAYS: ICD-10-CM

## 2022-09-16 DIAGNOSIS — H93.8X3 PLUGGED FEELING IN EAR, BILATERAL: ICD-10-CM

## 2022-09-16 PROCEDURE — 99203 OFFICE O/P NEW LOW 30 MIN: CPT | Performed by: NURSE PRACTITIONER

## 2022-09-16 PROCEDURE — G0463 HOSPITAL OUTPT CLINIC VISIT: HCPCS

## 2022-09-16 RX ORDER — AMITRIPTYLINE HYDROCHLORIDE 50 MG/1
50 TABLET ORAL DAILY
COMMUNITY
Start: 2022-09-14 | End: 2022-12-08

## 2022-09-16 RX ORDER — TRAVOPROST OPHTHALMIC SOLUTION 0.04 MG/ML
1 SOLUTION OPHTHALMIC DAILY
COMMUNITY
Start: 2022-08-26

## 2022-09-16 RX ORDER — AMOXICILLIN 875 MG
875 TABLET ORAL 2 TIMES DAILY
Qty: 14 TABLET | Refills: 0 | Status: SHIPPED | OUTPATIENT
Start: 2022-09-16 | End: 2022-09-23

## 2022-09-16 ASSESSMENT — PAIN SCALES - GENERAL: PAINLEVEL: MILD PAIN (2)

## 2022-09-16 NOTE — PROGRESS NOTES
ASSESSMENT/PLAN:     I have reviewed the nursing notes.  I have reviewed the findings, diagnosis, plan and need for follow up with the patient.      1. Right ear pain    May use over-the-counter Tylenol or ibuprofen PRN    2. Plugged feeling in ear, bilateral    - amoxicillin (AMOXIL) 875 MG tablet; Take 1 tablet (875 mg) by mouth 2 times daily for 7 days  Dispense: 14 tablet; Refill: 0    3. Chronic rhinitis  4. Acute sinusitis with symptoms > 10 days    - amoxicillin (AMOXIL) 875 MG tablet; Take 1 tablet (875 mg) by mouth 2 times daily for 7 days  Dispense: 14 tablet; Refill: 0    Recommend use of Flonase or Nasacort daily PRN  Continue frequent use of netti pot    Discussed warning signs/symptoms indicative of need to f/u  Follow up if symptoms persist or worsen or concerns      I explained my diagnostic considerations and recommendations to the patient, who voiced understanding and agreement with the treatment plan. All questions were answered. We discussed potential side effects of any prescribed or recommended therapies, as well as expectations for response to treatments.    Jesi Peralta NP  [facility]      SUBJECTIVE:   Laney Guzman is a 67 year old female who presents to clinic today for the following health issues:  Ear pain    HPI  Right ear with pain, ringing, and plugged sensation for the past month.  Mild left ear with plugged feeling as well but not painful.  Nasal congestion chronically, uses netti pot.  No sore throat.  No cough.  Intermittent sinus headaches, uses Excedrin.          Past Medical History:   Diagnosis Date     Glaucoma      Menopausal or female climacteric states 08/21/2000     Routine general medical examination at [facility] 08/18/2000     Past Surgical History:   Procedure Laterality Date     COLONOSCOPY  3-     COLONOSCOPY N/A 5/15/2017    Procedure: COLONOSCOPY;  COLONOSCOPY WITH BIOPSIES;  Surgeon: David Vieira MD;  Location: [facility] OR      excision of ganglion cyst       phlebectomies x11 R leg,x12 L leg      Bilateral, varicose veins     Social History     Tobacco Use     Smoking status: Never Smoker     Smokeless tobacco: Never Used     Tobacco comment: no passive exposure   Substance Use Topics     Alcohol use: Yes     Comment: socially     Current Outpatient Medications   Medication Sig Dispense Refill     amitriptyline (ELAVIL) 50 MG tablet Take 50 mg by mouth daily       Cholecalciferol (VITAMIN D PO) Take 1 tablet by mouth daily       CRANBERRY Take 1 by mouth daily       diphenoxylate-atropine (LOMOTIL) 2.5-0.025 MG per tablet Take 2 tablets by mouth 4 times daily as needed for diarrhea 30 tablet 0     estradiol (VAGIFEM) 10 MCG TABS vaginal tablet Place 1 tablet (10 mcg) vaginally twice a week 24 tablet 3     fish oil-omega-3 fatty acids 1000 MG capsule Take 1 by oral route every day       L-FORMULA LYSINE HCL PO Take 1 tablet by mouth daily       Loperamide HCl (IMODIUM A-D PO) Take by mouth as needed       Multiple Vitamin (DAILY MULTIVITAMIN PO) Take 1 tablet by oral route every day with food       travoprost BAK FREE (TRAVATAN Z) 0.004 % ophthalmic solution Apply 1 drop to eye daily       amitriptyline (ELAVIL) 25 MG tablet Take 1 tablet (25 mg) by mouth At Bedtime (Patient taking differently: Take 50 mg by mouth At Bedtime ) 30 tablet 0     bacitracin 500 UNIT/GM external ointment Apply topically 2 times daily (Patient not taking: Reported on 9/16/2022) 113.4 g 3     Ginger, Zingiber officinalis, (GINGER ROOT) 550 MG CAPS Take 550 mg by mouth daily (Patient not taking: Reported on 9/16/2022)       UNKNOWN TO PATIENT Eye drops for Glaucoma. Name unknown. 1 drop to each eye daily       valACYclovir (VALTREX) 1000 mg tablet TAKE2 TABLETS BY MOUTH 2 TIMES DAILY AS NEEDED (Patient not taking: Reported on 9/16/2022) 4 tablet 1     Allergies   Allergen Reactions     Augmentin Cramps         Past medical history, past surgical history,  current medications and allergies reviewed and accurate to the best of my knowledge.        OBJECTIVE:     BP (!) 156/92   Pulse 110   Temp 97.6  F (36.4  C) (Temporal)   Resp 16   Wt 73.3 kg (161 lb 9.6 oz)   SpO2 94%   BMI 29.09 kg/m    Body mass index is 29.09 kg/m .     Physical Exam  General Appearance: Well appearing adult female, appropriate appearance for age. No acute distress  Ears: Left TM intact with bony landmarks appreciated, no erythema, serous effusion with bulging, no purulence.  Right TM intact with bony landmarks appreciated, no erythema, serous effusion with bulging, no purulence.  Left auditory canal clear without drainage or bleeding.  Right auditory canal clear without drainage or bleeding.  Normal external ears, non tender.  Eyes: conjunctivae normal without erythema or irritation, corneas clear, no drainage or crusting, no eyelid swelling, pupils equal   Orophayrnx: moist mucous membranes, pharynx without erythema, tonsils without hypertrophy, tonsils without erythema, no tonsillar exudates, no oral lesions, no palate petechiae, no post nasal drip seen, no trismus, voice clear.    Sinuses:  No sinus tenderness upon palpation of the frontal or maxillary sinuses  Nose:  Bilateral nares with erythema and edema, mild congestion, no noted active drainage  Neck: supple without adenopathy  Respiratory: normal chest wall and respirations.  Normal effort.  Clear to auscultation bilaterally, no wheezing, crackles or rhonchi.  No increased work of breathing.  No cough appreciated.  Musculoskeletal:  Equal movement of bilateral upper extremities.  Equal movement of bilateral lower extremities.  Normal gait.    Psychological: normal affect, alert, oriented, and pleasant.

## 2022-09-16 NOTE — NURSING NOTE
"Chief Complaint   Patient presents with     Ear Problem     Right ear ache, some pain in left as well   Patient presents to clinic today with concerns of an ear ache. Mostly in right ear but some in left as well. Right ear is ringing and feels plugged, pain. Symptoms started a month ago.    Initial BP (!) 156/92   Pulse 110   Temp 97.6  F (36.4  C) (Temporal)   Resp 16   Wt 73.3 kg (161 lb 9.6 oz)   SpO2 94%   BMI 29.09 kg/m   Estimated body mass index is 29.09 kg/m  as calculated from the following:    Height as of 11/4/21: 1.588 m (5' 2.5\").    Weight as of this encounter: 73.3 kg (161 lb 9.6 oz).      Medication Reconciliation: complete    Caro Leonard LPN   9/16/2022  12:50 PM  "

## 2022-11-04 ENCOUNTER — OFFICE VISIT (OUTPATIENT)
Dept: FAMILY MEDICINE | Facility: OTHER | Age: 67
End: 2022-11-04
Attending: FAMILY MEDICINE
Payer: COMMERCIAL

## 2022-11-04 VITALS
RESPIRATION RATE: 18 BRPM | BODY MASS INDEX: 28.62 KG/M2 | SYSTOLIC BLOOD PRESSURE: 134 MMHG | DIASTOLIC BLOOD PRESSURE: 88 MMHG | OXYGEN SATURATION: 99 % | TEMPERATURE: 98.4 F | HEART RATE: 99 BPM | WEIGHT: 159 LBS

## 2022-11-04 DIAGNOSIS — H93.8X1 PRESSURE SENSATION IN RIGHT EAR: Primary | ICD-10-CM

## 2022-11-04 DIAGNOSIS — H93.13 TINNITUS, BILATERAL: ICD-10-CM

## 2022-11-04 PROCEDURE — 99213 OFFICE O/P EST LOW 20 MIN: CPT | Performed by: FAMILY MEDICINE

## 2022-11-04 PROCEDURE — G0463 HOSPITAL OUTPT CLINIC VISIT: HCPCS

## 2022-11-04 RX ORDER — PREDNISONE 20 MG/1
60 TABLET ORAL DAILY
Qty: 15 TABLET | Refills: 0 | Status: SHIPPED | OUTPATIENT
Start: 2022-11-04 | End: 2022-11-09

## 2022-11-04 RX ORDER — FLUCONAZOLE 150 MG/1
TABLET ORAL
COMMUNITY
Start: 2022-05-16 | End: 2022-12-08

## 2022-11-04 RX ORDER — CEFDINIR 300 MG/1
300 CAPSULE ORAL 2 TIMES DAILY
Qty: 20 CAPSULE | Refills: 0 | Status: SHIPPED | OUTPATIENT
Start: 2022-11-04 | End: 2022-12-08

## 2022-11-04 RX ORDER — AMOXICILLIN AND CLAVULANATE POTASSIUM 500; 125 MG/1; MG/1
TABLET, FILM COATED ORAL
COMMUNITY
Start: 2022-05-02 | End: 2022-12-08

## 2022-11-04 ASSESSMENT — PAIN SCALES - GENERAL: PAINLEVEL: MILD PAIN (2)

## 2022-11-04 NOTE — NURSING NOTE
"Chief Complaint   Patient presents with     Ear Problem     Ear ache       Initial BP (!) 140/90   Pulse 99   Temp 98.4  F (36.9  C) (Tympanic)   Resp 18   Wt 72.1 kg (159 lb)   SpO2 99%   BMI 28.62 kg/m   Estimated body mass index is 28.62 kg/m  as calculated from the following:    Height as of 11/4/21: 1.588 m (5' 2.5\").    Weight as of this encounter: 72.1 kg (159 lb).  Medication Reconciliation: complete  Wale Phillip  "

## 2022-11-04 NOTE — PROGRESS NOTES
Assessment & Plan     Pressure sensation in right ear  - cefdinir (OMNICEF) 300 MG capsule; Take 1 capsule (300 mg) by mouth 2 times daily  - predniSONE (DELTASONE) 20 MG tablet; Take 3 tablets (60 mg) by mouth daily for 5 days (Patient not taking: Reported on 11/8/2022)  - Adult ENT  Referral; Future    Tinnitus, bilateral        KAYLEN DELUCA,   [facility] - JESSICA Davison is a 67 year old, presenting for the following health issues:  Ear Problem (Ear ache)      HPI     Concern - Earache  Onset: August   Description: August had ear ache went in in September and had sinus infection. She was given Amoxicillin. Started getting bad again 2 nights ago.  Intensity: mild  Progression of Symptoms:  worsening  Accompanying Signs & Symptoms: Ear feels plugged, throat, sinus drainage.  Previous history of similar problem: yes  Precipitating factors:        Worsened by: no  Alleviating factors:        Improved by: Justina pot  Therapies tried and outcome: Nasal spray, claritin  +tinnitus    Review of Systems   Constitutional: Negative for fever.   Respiratory: Negative for shortness of breath.             Objective    /88   Pulse 99   Temp 98.4  F (36.9  C) (Tympanic)   Resp 18   Wt 72.1 kg (159 lb)   SpO2 99%   BMI 28.62 kg/m    Body mass index is 28.62 kg/m .  Physical Exam  Constitutional:       General: She is not in acute distress.     Appearance: Normal appearance.   HENT:      Head: Normocephalic and atraumatic.      Right Ear: Tympanic membrane, ear canal and external ear normal.      Left Ear: Tympanic membrane, ear canal and external ear normal.      Nose: Nose normal.      Mouth/Throat:      Mouth: Mucous membranes are moist.      Pharynx: Oropharynx is clear.   Eyes:      Extraocular Movements: Extraocular movements intact.      Conjunctiva/sclera: Conjunctivae normal.      Pupils: Pupils are equal, round, and reactive to light.   Cardiovascular:      Rate and  Rhythm: Normal rate and regular rhythm.      Heart sounds: Normal heart sounds. No murmur heard.  Pulmonary:      Effort: Pulmonary effort is normal.      Breath sounds: Normal breath sounds. No wheezing.   Lymphadenopathy:      Cervical: No cervical adenopathy.   Neurological:      Mental Status: She is alert and oriented to person, place, and time.

## 2022-11-07 DIAGNOSIS — H91.93 DECREASED HEARING OF BOTH EARS: Primary | ICD-10-CM

## 2022-11-08 ENCOUNTER — OFFICE VISIT (OUTPATIENT)
Dept: OTOLARYNGOLOGY | Facility: OTHER | Age: 67
End: 2022-11-08
Attending: AUDIOLOGIST
Payer: COMMERCIAL

## 2022-11-08 ENCOUNTER — OFFICE VISIT (OUTPATIENT)
Dept: AUDIOLOGY | Facility: OTHER | Age: 67
End: 2022-11-08
Attending: AUDIOLOGIST
Payer: COMMERCIAL

## 2022-11-08 VITALS
HEART RATE: 96 BPM | WEIGHT: 159 LBS | HEIGHT: 62 IN | TEMPERATURE: 98 F | SYSTOLIC BLOOD PRESSURE: 132 MMHG | OXYGEN SATURATION: 98 % | BODY MASS INDEX: 29.26 KG/M2 | DIASTOLIC BLOOD PRESSURE: 76 MMHG

## 2022-11-08 DIAGNOSIS — H90.6 MIXED CONDUCTIVE AND SENSORINEURAL HEARING LOSS OF BOTH EARS: Primary | ICD-10-CM

## 2022-11-08 DIAGNOSIS — H93.8X1 PRESSURE SENSATION IN RIGHT EAR: ICD-10-CM

## 2022-11-08 DIAGNOSIS — H91.93 DECREASED HEARING OF BOTH EARS: ICD-10-CM

## 2022-11-08 DIAGNOSIS — H69.91 DYSFUNCTION OF RIGHT EUSTACHIAN TUBE: ICD-10-CM

## 2022-11-08 DIAGNOSIS — H74.41 POLYP OF RIGHT MIDDLE EAR: Primary | ICD-10-CM

## 2022-11-08 DIAGNOSIS — H72.91 PERFORATION OF TYMPANIC MEMBRANE, RIGHT: ICD-10-CM

## 2022-11-08 PROCEDURE — 31231 NASAL ENDOSCOPY DX: CPT | Performed by: NURSE PRACTITIONER

## 2022-11-08 PROCEDURE — 99213 OFFICE O/P EST LOW 20 MIN: CPT | Mod: 25 | Performed by: NURSE PRACTITIONER

## 2022-11-08 PROCEDURE — G0463 HOSPITAL OUTPT CLINIC VISIT: HCPCS

## 2022-11-08 PROCEDURE — 92504 EAR MICROSCOPY EXAMINATION: CPT | Performed by: NURSE PRACTITIONER

## 2022-11-08 PROCEDURE — 92557 COMPREHENSIVE HEARING TEST: CPT | Performed by: AUDIOLOGIST

## 2022-11-08 PROCEDURE — 92550 TYMPANOMETRY & REFLEX THRESH: CPT | Performed by: AUDIOLOGIST

## 2022-11-08 RX ORDER — CIPROFLOXACIN AND DEXAMETHASONE 3; 1 MG/ML; MG/ML
4 SUSPENSION/ DROPS AURICULAR (OTIC) 2 TIMES DAILY
Qty: 4 ML | Refills: 0 | Status: SHIPPED | OUTPATIENT
Start: 2022-11-08 | End: 2022-11-18

## 2022-11-08 ASSESSMENT — PAIN SCALES - GENERAL: PAINLEVEL: NO PAIN (0)

## 2022-11-08 NOTE — PROGRESS NOTES
"Otolaryngology Note         Chief Complaint:     Patient presents with:  Hearing Problem: Decreased  hearing            History of Present Illness:     Laney Guzman is a 67 year old female seen today for a plugged sensation in the right ear.  She was using nasal spray, netti pot    She was seen in the walk in clinic in September, was treated amoxicillin x 7 days with improvement in ear pain, but the plugged sensation continued.      She then saw Dr Munoz on 11/4/22 for continued pressure and plugged sensation in the right ear. She was prescribed Cefdinir and prednisone. She started Cefdinir but has held off on the prednisone.      She first thought it wax, so she flushed her ears, no wax.  She denied pain with flushing.   She has tinnitus in both ears  She feels that her hearing is overall pretty good   + history of recurrent OM, she saw an ENT provider in the 1980's and had some procedure on her sinus or ears - cannot remember but she did have improvement  She has some frontal sinus pain and pressure.     She does valsalva and it feels like her ears open.    No otorrhea  NO flux hearing  She has had some issues with feeling \"wobbly\" while shopping.  The symptoms lasted 5-10 minutes and resolved on their own.  She denies rotary vertigo.   Never smoker   Patient has no history of otological surgeries or procedures  No family history of hearing loss or COM.    No current concerns with otalgia, otorrhea.    No history of noise exposure  No facial numbness or tingling.      Frontal headaches in the morning. Feels that she clenches.    She has had sinus infections in the past - no recurrent sinusitis.      Audiogram completed 11/8/22:  Tympanograms are Type A for left ear suggesting normal eardrum mobility and Type B with large volume right ear-repeatable.  Acoustic Reflex Thresholds at 1000 Hz are present only ipsilateral right.  Thresholds are normal sloping to mild sensorineural left and normal right sloping to " severe mixed loss.  Speech reception thresholds are in good agreement with pure tone average.  Word discrimination scores are excellent at supra-thresholds level.         Medications:     Current Outpatient Rx   Medication Sig Dispense Refill     amitriptyline (ELAVIL) 25 MG tablet Take 1 tablet (25 mg) by mouth At Bedtime 30 tablet 0     amitriptyline (ELAVIL) 50 MG tablet Take 50 mg by mouth daily       cefdinir (OMNICEF) 300 MG capsule Take 1 capsule (300 mg) by mouth 2 times daily 20 capsule 0     Cholecalciferol (VITAMIN D PO) Take 1 tablet by mouth daily       ciprofloxacin-dexamethasone (CIPRODEX) 0.3-0.1 % otic suspension Place 4 drops into the right ear 2 times daily for 10 days 4 mL 0     CRANBERRY Take 1 by mouth daily       fish oil-omega-3 fatty acids 1000 MG capsule Take 1 by oral route every day       L-FORMULA LYSINE HCL PO Take 1 tablet by mouth daily       Multiple Vitamin (DAILY MULTIVITAMIN PO) Take 1 tablet by oral route every day with food       travoprost BAK FREE (TRAVATAN Z) 0.004 % ophthalmic solution Apply 1 drop to eye daily       UNKNOWN TO PATIENT Eye drops for Glaucoma. Name unknown. 1 drop to each eye daily       acetaminophen-codeine (TYLENOL #3) 300-30 MG tablet  (Patient not taking: Reported on 11/4/2022)       amoxicillin-clavulanate (AUGMENTIN) 500-125 MG tablet  (Patient not taking: Reported on 11/4/2022)       bacitracin 500 UNIT/GM external ointment Apply topically 2 times daily (Patient not taking: Reported on 9/16/2022) 113.4 g 3     diphenoxylate-atropine (LOMOTIL) 2.5-0.025 MG per tablet Take 2 tablets by mouth 4 times daily as needed for diarrhea (Patient not taking: Reported on 11/4/2022) 30 tablet 0     estradiol (VAGIFEM) 10 MCG TABS vaginal tablet Place 1 tablet (10 mcg) vaginally twice a week (Patient not taking: Reported on 11/4/2022) 24 tablet 3     fluconazole (DIFLUCAN) 150 MG tablet  (Patient not taking: Reported on 11/4/2022)       Ginger, Zingiber officinalis,  "(GINGER ROOT) 550 MG CAPS Take 550 mg by mouth daily (Patient not taking: Reported on 9/16/2022)       Loperamide HCl (IMODIUM A-D PO) Take by mouth as needed (Patient not taking: Reported on 11/4/2022)       valACYclovir (VALTREX) 1000 mg tablet TAKE2 TABLETS BY MOUTH 2 TIMES DAILY AS NEEDED (Patient not taking: Reported on 9/16/2022) 4 tablet 1            Allergies:     Allergies: Augmentin          Past Medical History:     Past Medical History:   Diagnosis Date     Glaucoma      Menopausal or female climacteric states 08/21/2000     Routine general medical examination at [facility] 08/18/2000            Past Surgical History:     Past Surgical History:   Procedure Laterality Date     COLONOSCOPY  3-     COLONOSCOPY N/A 5/15/2017    Procedure: COLONOSCOPY;  COLONOSCOPY WITH BIOPSIES;  Surgeon: David Vieira MD;  Location: [facility] OR     excision of ganglion cyst       phlebectomies x11 R leg,x12 L leg      Bilateral, varicose veins       ENT family history reviewed         Social History:     Social History     Tobacco Use     Smoking status: Never     Smokeless tobacco: Never     Tobacco comments:     no passive exposure   Substance Use Topics     Alcohol use: Yes     Comment: socially     Drug use: No            Review of Systems:     ROS: See HPI         Physical Exam:     /76 (Cuff Size: Adult Regular)   Pulse 96   Temp 98  F (36.7  C) (Tympanic)   Ht 1.575 m (5' 2\")   Wt 72.1 kg (159 lb)   SpO2 98%   BMI 29.08 kg/m    General - The patient is well nourished and well developed, and appears to have good nutritional status.  Alert and oriented to person and place, answers questions and cooperates with examination appropriately.   Head and Face - Normocephalic and atraumatic, with no gross asymmetry noted.  The facial nerve is intact, with strong symmetric movements.  Voice and Breathing - The patient was breathing comfortably without the use of accessory muscles. There was no wheezing, " stridor. The patients voice was clear and strong, and had appropriate pitch and quality.  Ears - External ear normal.  The ears were examined under binocular microscopy and with otoscope.  Left canal has a small amount of cerumen that was removed with cerumen loop.  The left TM is intact without effusion or retraction.  The right canal has some thick dried otorrhea along the posterior inferior portion of the canal, this was removed and I noted a aural polyp adjacent to the tympanic membrane on the posterior inferior quadrant.  I was able to suction debride the polyp and remove it.  There is some mild oozing of blood after removal of the polyp.  The right tympanic membrane has monomeric covering over the posterior portion with a inferior posterior perforation, approximately 10% with dry edges.  The visualized middle ear space appears dry and healthy.  There is a small hematoma on the inferior posterior canal after removal of the hard otorrhea.  Eyes - Extraocular movements intact, sclera were not icteric or injected, conjunctiva were pink and moist.  Mouth - Examination of the oral cavity showed pink, healthy oral mucosa. Dentition in good condition. No lesions or ulcerations noted. The tongue was mobile and midline.   Throat - The walls of the oropharynx were smooth, pink, moist, symmetric, and had no lesions or ulcerations.  The tonsillar pillars and soft palate were symmetric. The uvula was midline on elevation.    Neck - Normal midline excursion of the laryngotracheal complex during swallowing.  Full range of motion on passive movement.  Palpation of the occipital, submental, submandibular, internal jugular chain, and supraclavicular nodes did not demonstrate any abnormal lymph nodes or masses.  Palpation of the thyroid was soft and smooth, with no nodules or goiter appreciated.  The trachea was mobile and midline.  Nose - External contour is symmetric, no gross deflection or scars.  Nasal mucosa is pink and  moist with no abnormal mucus.  The septum and turbinates were evaluated with nasal speculum,  no polyps, masses, or purulence noted on examination of anterior nasal cavity.    To evaluate the nose and sinuses, I performed rigid nasal endoscopy.  I sprayed both nares with 2 sprays lidocaine and neosynephrine.     I began with the RIGHT side using a 0 degree rigid nasal endoscope, and then similarly examined the LEFT side     Findings:   Inferior turbinates:  enlarged  Middle turbinate and middle meatus: normal  The superior meatus is examined and unremarkable  Mucosa is healthy throughout,  no polyps nor polypoid degeneration  No sphenoethmoidal recess purulence.   Nasopharynx clear, ET patent, no edema  The patient tolerated the procedure well            Assessment and Plan:       ICD-10-CM    1. Polyp of right middle ear  H74.41 ciprofloxacin-dexamethasone (CIPRODEX) 0.3-0.1 % otic suspension      2. Pressure sensation in right ear  H93.8X1 Adult ENT  Referral      3. Perforation of tympanic membrane, right  H72.91 ciprofloxacin-dexamethasone (CIPRODEX) 0.3-0.1 % otic suspension        Stop taking Cefdinir     Continue eli med sinus rinses    Keep right ear dry     Start ciprodex otics for aural polyp    Follow up in 3 weeks with Marely Galvez NP-C  [facility]

## 2022-11-08 NOTE — PATIENT INSTRUCTIONS
Thank you for allowing Marely Galvez and our ENT team to participate in your care.  If your medications are too expensive, please give the nurse a call.  We can possibly change this medication.  If you have a scheduling or an appointment question please contact our Health Unit Coordinator at their direct line [phone].   ALL nursing questions or concerns can be directed to your ENT nurse at: [phone] - Pzj     Stop taking Cefdinir     Continue Rinsing your sinus    Keep right ear dry         Follow up in 3 weeks with Marely Galvez

## 2022-11-08 NOTE — NURSING NOTE
"Chief Complaint   Patient presents with     Hearing Problem     Decreased  hearing        Initial /76 (Cuff Size: Adult Regular)   Pulse 96   Temp 98  F (36.7  C) (Tympanic)   Ht 1.575 m (5' 2\")   Wt 72.1 kg (159 lb)   SpO2 98%   BMI 29.08 kg/m   Estimated body mass index is 29.08 kg/m  as calculated from the following:    Height as of this encounter: 1.575 m (5' 2\").    Weight as of this encounter: 72.1 kg (159 lb).  Medication Reconciliation: complete  Zoë Stevens LPN    "

## 2022-11-08 NOTE — LETTER
"    11/8/2022         RE: Laney Guzman  [address]  [place] [address]        Dear Colleague,    Thank you for referring your patient, Laney Guzman, to the [facility] - [facility]. Please see a copy of my visit note below.    Otolaryngology Note         Chief Complaint:     Patient presents with:  Hearing Problem: Decreased  hearing            History of Present Illness:     Laney Guzman is a 67 year old female seen today for a plugged sensation in the right ear.  She was using nasal spray, netti pot    She was seen in the walk in clinic in September, was treated amoxicillin x 7 days with improvement in ear pain, but the plugged sensation continued.      She then saw Dr Munoz on 11/4/22 for continued pressure and plugged sensation in the right ear. She was prescribed Cefdinir and prednisone. She started Cefdinir but has held off on the prednisone.      She first thought it wax, so she flushed her ears, no wax.  She denied pain with flushing.   She has tinnitus in both ears  She feels that her hearing is overall pretty good   + history of recurrent OM, she saw an ENT provider in the 1980's and had some procedure on her sinus or ears - cannot remember but she did have improvement  She has some frontal sinus pain and pressure.     She does valsalva and it feels like her ears open.    No otorrhea  NO flux hearing  She has had some issues with feeling \"wobbly\" while shopping.  The symptoms lasted 5-10 minutes and resolved on their own.  She denies rotary vertigo.   Never smoker   Patient has no history of otological surgeries or procedures  No family history of hearing loss or COM.    No current concerns with otalgia, otorrhea.    No history of noise exposure  No facial numbness or tingling.      Frontal headaches in the morning. Feels that she clenches.    She has had sinus infections in the past - no recurrent sinusitis.      Audiogram completed 11/8/22:  Tympanograms are Type A " for left ear suggesting normal eardrum mobility and Type B with large volume right ear-repeatable.  Acoustic Reflex Thresholds at 1000 Hz are present only ipsilateral right.  Thresholds are normal sloping to mild sensorineural left and normal right sloping to severe mixed loss.  Speech reception thresholds are in good agreement with pure tone average.  Word discrimination scores are excellent at supra-thresholds level.         Medications:     Current Outpatient Rx   Medication Sig Dispense Refill     amitriptyline (ELAVIL) 25 MG tablet Take 1 tablet (25 mg) by mouth At Bedtime 30 tablet 0     amitriptyline (ELAVIL) 50 MG tablet Take 50 mg by mouth daily       cefdinir (OMNICEF) 300 MG capsule Take 1 capsule (300 mg) by mouth 2 times daily 20 capsule 0     Cholecalciferol (VITAMIN D PO) Take 1 tablet by mouth daily       ciprofloxacin-dexamethasone (CIPRODEX) 0.3-0.1 % otic suspension Place 4 drops into the right ear 2 times daily for 10 days 4 mL 0     CRANBERRY Take 1 by mouth daily       fish oil-omega-3 fatty acids 1000 MG capsule Take 1 by oral route every day       L-FORMULA LYSINE HCL PO Take 1 tablet by mouth daily       Multiple Vitamin (DAILY MULTIVITAMIN PO) Take 1 tablet by oral route every day with food       travoprost BAK FREE (TRAVATAN Z) 0.004 % ophthalmic solution Apply 1 drop to eye daily       UNKNOWN TO PATIENT Eye drops for Glaucoma. Name unknown. 1 drop to each eye daily       acetaminophen-codeine (TYLENOL #3) 300-30 MG tablet  (Patient not taking: Reported on 11/4/2022)       amoxicillin-clavulanate (AUGMENTIN) 500-125 MG tablet  (Patient not taking: Reported on 11/4/2022)       bacitracin 500 UNIT/GM external ointment Apply topically 2 times daily (Patient not taking: Reported on 9/16/2022) 113.4 g 3     diphenoxylate-atropine (LOMOTIL) 2.5-0.025 MG per tablet Take 2 tablets by mouth 4 times daily as needed for diarrhea (Patient not taking: Reported on 11/4/2022) 30 tablet 0     estradiol  "(VAGIFEM) 10 MCG TABS vaginal tablet Place 1 tablet (10 mcg) vaginally twice a week (Patient not taking: Reported on 11/4/2022) 24 tablet 3     fluconazole (DIFLUCAN) 150 MG tablet  (Patient not taking: Reported on 11/4/2022)       Ginger, Zingiber officinalis, (GINGER ROOT) 550 MG CAPS Take 550 mg by mouth daily (Patient not taking: Reported on 9/16/2022)       Loperamide HCl (IMODIUM A-D PO) Take by mouth as needed (Patient not taking: Reported on 11/4/2022)       valACYclovir (VALTREX) 1000 mg tablet TAKE2 TABLETS BY MOUTH 2 TIMES DAILY AS NEEDED (Patient not taking: Reported on 9/16/2022) 4 tablet 1            Allergies:     Allergies: Augmentin          Past Medical History:     Past Medical History:   Diagnosis Date     Glaucoma      Menopausal or female climacteric states 08/21/2000     Routine general medical examination at [facility] 08/18/2000            Past Surgical History:     Past Surgical History:   Procedure Laterality Date     COLONOSCOPY  3-     COLONOSCOPY N/A 5/15/2017    Procedure: COLONOSCOPY;  COLONOSCOPY WITH BIOPSIES;  Surgeon: David Vieira MD;  Location: [facility] OR     excision of ganglion cyst       phlebectomies x11 R leg,x12 L leg      Bilateral, varicose veins       ENT family history reviewed         Social History:     Social History     Tobacco Use     Smoking status: Never     Smokeless tobacco: Never     Tobacco comments:     no passive exposure   Substance Use Topics     Alcohol use: Yes     Comment: socially     Drug use: No            Review of Systems:     ROS: See HPI         Physical Exam:     /76 (Cuff Size: Adult Regular)   Pulse 96   Temp 98  F (36.7  C) (Tympanic)   Ht 1.575 m (5' 2\")   Wt 72.1 kg (159 lb)   SpO2 98%   BMI 29.08 kg/m    General - The patient is well nourished and well developed, and appears to have good nutritional status.  Alert and oriented to person and place, answers questions and cooperates with examination appropriately.   Head " and Face - Normocephalic and atraumatic, with no gross asymmetry noted.  The facial nerve is intact, with strong symmetric movements.  Voice and Breathing - The patient was breathing comfortably without the use of accessory muscles. There was no wheezing, stridor. The patients voice was clear and strong, and had appropriate pitch and quality.  Ears - External ear normal.  The ears were examined under binocular microscopy and with otoscope.  Left canal has a small amount of cerumen that was removed with cerumen loop.  The left TM is intact without effusion or retraction.  The right canal has some thick dried otorrhea along the posterior inferior portion of the canal, this was removed and I noted a aural polyp adjacent to the tympanic membrane on the posterior inferior quadrant.  I was able to suction debride the polyp and remove it.  There is some mild oozing of blood after removal of the polyp.  The right tympanic membrane has monomeric covering over the posterior portion with a inferior posterior perforation, approximately 10% with dry edges.  The visualized middle ear space appears dry and healthy.  There is a small hematoma on the inferior posterior canal after removal of the hard otorrhea.  Eyes - Extraocular movements intact, sclera were not icteric or injected, conjunctiva were pink and moist.  Mouth - Examination of the oral cavity showed pink, healthy oral mucosa. Dentition in good condition. No lesions or ulcerations noted. The tongue was mobile and midline.   Throat - The walls of the oropharynx were smooth, pink, moist, symmetric, and had no lesions or ulcerations.  The tonsillar pillars and soft palate were symmetric. The uvula was midline on elevation.    Neck - Normal midline excursion of the laryngotracheal complex during swallowing.  Full range of motion on passive movement.  Palpation of the occipital, submental, submandibular, internal jugular chain, and supraclavicular nodes did not demonstrate  any abnormal lymph nodes or masses.  Palpation of the thyroid was soft and smooth, with no nodules or goiter appreciated.  The trachea was mobile and midline.  Nose - External contour is symmetric, no gross deflection or scars.  Nasal mucosa is pink and moist with no abnormal mucus.  The septum and turbinates were evaluated with nasal speculum,  no polyps, masses, or purulence noted on examination of anterior nasal cavity.    To evaluate the nose and sinuses, I performed rigid nasal endoscopy.  I sprayed both nares with 2 sprays lidocaine and neosynephrine.     I began with the RIGHT side using a 0 degree rigid nasal endoscope, and then similarly examined the LEFT side     Findings:   Inferior turbinates:  enlarged  Middle turbinate and middle meatus: normal  The superior meatus is examined and unremarkable  Mucosa is healthy throughout,  no polyps nor polypoid degeneration  No sphenoethmoidal recess purulence.   Nasopharynx clear, ET patent, no edema  The patient tolerated the procedure well            Assessment and Plan:       ICD-10-CM    1. Polyp of right middle ear  H74.41 ciprofloxacin-dexamethasone (CIPRODEX) 0.3-0.1 % otic suspension      2. Pressure sensation in right ear  H93.8X1 Adult ENT  Referral      3. Perforation of tympanic membrane, right  H72.91 ciprofloxacin-dexamethasone (CIPRODEX) 0.3-0.1 % otic suspension        Stop taking Cefdinir     Continue eli med sinus rinses    Keep right ear dry     Start ciprodex otics for aural polyp    Follow up in 3 weeks with Marely MUHAMMAD  [facility]        Again, thank you for allowing me to participate in the care of your patient.        Sincerely,        Marely Galvez NP

## 2022-11-10 ASSESSMENT — ENCOUNTER SYMPTOMS
FEVER: 0
SHORTNESS OF BREATH: 0

## 2022-11-28 NOTE — PATIENT INSTRUCTIONS
Thank you for allowing Marely Galvez and our ENT team to participate in your care.  If your medications are too expensive, please give the nurse a call.  We can possibly change this medication.  If you have a scheduling or an appointment question please contact our Health Unit Coordinator at their direct line [phone].   ALL nursing questions or concerns can be directed to your ENT nurse at: [phone] - Hxj       Follow up in 3 months with Marely Galvez   Audiogram ordered to be done before appointment with Marely Galvez.     Keep right ear dry   Start Flonase once a day 2 sprays into each nostril for 3 months

## 2022-11-29 ENCOUNTER — OFFICE VISIT (OUTPATIENT)
Dept: OTOLARYNGOLOGY | Facility: OTHER | Age: 67
End: 2022-11-29
Attending: NURSE PRACTITIONER
Payer: COMMERCIAL

## 2022-11-29 VITALS
DIASTOLIC BLOOD PRESSURE: 78 MMHG | WEIGHT: 160 LBS | SYSTOLIC BLOOD PRESSURE: 124 MMHG | BODY MASS INDEX: 29.44 KG/M2 | HEIGHT: 62 IN | TEMPERATURE: 98.1 F | HEART RATE: 96 BPM | OXYGEN SATURATION: 98 %

## 2022-11-29 DIAGNOSIS — H72.91 PERFORATION OF TYMPANIC MEMBRANE, RIGHT: Primary | ICD-10-CM

## 2022-11-29 DIAGNOSIS — H90.A22 SENSORINEURAL HEARING LOSS (SNHL) OF LEFT EAR WITH RESTRICTED HEARING OF RIGHT EAR: ICD-10-CM

## 2022-11-29 DIAGNOSIS — H90.A31 MIXED CONDUCTIVE AND SENSORINEURAL HEARING LOSS OF RIGHT EAR WITH RESTRICTED HEARING OF LEFT EAR: ICD-10-CM

## 2022-11-29 PROCEDURE — 99213 OFFICE O/P EST LOW 20 MIN: CPT | Mod: 25 | Performed by: NURSE PRACTITIONER

## 2022-11-29 PROCEDURE — 92504 EAR MICROSCOPY EXAMINATION: CPT | Performed by: NURSE PRACTITIONER

## 2022-11-29 PROCEDURE — G0463 HOSPITAL OUTPT CLINIC VISIT: HCPCS

## 2022-11-29 ASSESSMENT — PAIN SCALES - GENERAL: PAINLEVEL: NO PAIN (0)

## 2022-11-29 NOTE — PROGRESS NOTES
Otolaryngology Note         Chief Complaint:     Patient presents with:  Follow Up: 3 week follow up / Ear Check/ Polyp of right middle ear            History of Present Illness:     Laney Guzman is a 67 year old female seen today for follow-up plugged sensation in the right ear.  I saw her last in ENT on 11/8/2022.  At that time the right ear had thick dried otorrhea along the posterior inferior portion of the canal that was removed and noted an aural polyp under the otorrhea.  I was able to suction away the polyp and remove it.  She did have some mild oozing of blood after removal of the top polyp.  There was a microperforation of the inferior posterior tympanic membrane.  Was treated with Ciprodex.  Nasopharyngoscopy showed bilateral enlarged inferior turbinates otherwise normal with a clear nasopharynx    Today she reports that the right ear improving, less plugging sensation.  Feels that her hearing is improving.   Completed drops.  No pain or drainage.    She does have some nasal congestion.      Audiogram completed 11/8/2022:  Tympanograms are Type A for left ear suggesting normal eardrum mobility and Type B with large volume right ear-repeatable.  Acoustic Reflex Thresholds at 1000 Hz are present only ipsilateral right.  Thresholds are normal sloping to mild sensorineural left and normal right sloping to severe mixed loss.  Speech reception thresholds are in good agreement with pure tone average.  Word discrimination scores are excellent at supra-thresholds level.         Medications:     Current Outpatient Rx   Medication Sig Dispense Refill     acetaminophen-codeine (TYLENOL #3) 300-30 MG tablet  (Patient not taking: Reported on 11/4/2022)       amitriptyline (ELAVIL) 25 MG tablet Take 1 tablet (25 mg) by mouth At Bedtime 30 tablet 0     amitriptyline (ELAVIL) 50 MG tablet Take 50 mg by mouth daily       amoxicillin-clavulanate (AUGMENTIN) 500-125 MG tablet  (Patient not taking: Reported on  11/4/2022)       bacitracin 500 UNIT/GM external ointment Apply topically 2 times daily (Patient not taking: Reported on 9/16/2022) 113.4 g 3     cefdinir (OMNICEF) 300 MG capsule Take 1 capsule (300 mg) by mouth 2 times daily 20 capsule 0     Cholecalciferol (VITAMIN D PO) Take 1 tablet by mouth daily       CRANBERRY Take 1 by mouth daily       diphenoxylate-atropine (LOMOTIL) 2.5-0.025 MG per tablet Take 2 tablets by mouth 4 times daily as needed for diarrhea (Patient not taking: Reported on 11/4/2022) 30 tablet 0     estradiol (VAGIFEM) 10 MCG TABS vaginal tablet Place 1 tablet (10 mcg) vaginally twice a week (Patient not taking: Reported on 11/4/2022) 24 tablet 3     fish oil-omega-3 fatty acids 1000 MG capsule Take 1 by oral route every day       fluconazole (DIFLUCAN) 150 MG tablet  (Patient not taking: Reported on 11/4/2022)       Ginger, Zingiber officinalis, (GINGER ROOT) 550 MG CAPS Take 550 mg by mouth daily (Patient not taking: Reported on 9/16/2022)       L-FORMULA LYSINE HCL PO Take 1 tablet by mouth daily       Loperamide HCl (IMODIUM A-D PO) Take by mouth as needed (Patient not taking: Reported on 11/4/2022)       Multiple Vitamin (DAILY MULTIVITAMIN PO) Take 1 tablet by oral route every day with food       travoprost BAK FREE (TRAVATAN Z) 0.004 % ophthalmic solution Apply 1 drop to eye daily       UNKNOWN TO PATIENT Eye drops for Glaucoma. Name unknown. 1 drop to each eye daily       valACYclovir (VALTREX) 1000 mg tablet TAKE2 TABLETS BY MOUTH 2 TIMES DAILY AS NEEDED (Patient not taking: Reported on 9/16/2022) 4 tablet 1            Allergies:     Allergies: Augmentin          Past Medical History:     Past Medical History:   Diagnosis Date     Glaucoma      Menopausal or female climacteric states 08/21/2000     Routine general medical examination at [facility] 08/18/2000            Past Surgical History:     Past Surgical History:   Procedure Laterality Date     COLONOSCOPY  3-      "COLONOSCOPY N/A 5/15/2017    Procedure: COLONOSCOPY;  COLONOSCOPY WITH BIOPSIES;  Surgeon: David Vieira MD;  Location: [facility] OR     excision of ganglion cyst       phlebectomies x11 R leg,x12 L leg      Bilateral, varicose veins       ENT family history reviewed         Social History:     Social History     Tobacco Use     Smoking status: Never     Smokeless tobacco: Never     Tobacco comments:     no passive exposure   Substance Use Topics     Alcohol use: Yes     Comment: socially     Drug use: No            Review of Systems:     ROS: See HPI         Physical Exam:     /78 (BP Location: Right arm, Patient Position: Sitting, Cuff Size: Adult Regular)   Pulse 96   Temp 98.1  F (36.7  C) (Tympanic)   Ht 1.575 m (5' 2\")   Wt 72.6 kg (160 lb)   SpO2 98%   BMI 29.26 kg/m      General - The patient is well nourished and well developed, and appears to have good nutritional status.  Alert and oriented to person and place, answers questions and cooperates with examination appropriately.   Head and Face - Normocephalic and atraumatic, with no gross asymmetry noted.  The facial nerve is intact, with strong symmetric movements.  Voice and Breathing - The patient was breathing comfortably without the use of accessory muscles. There was no wheezing, stridor. The patients voice was clear and strong, and had appropriate pitch and quality.  Ears - External ear normal. The ears were examined under binocular microscopy and with ototsocpe.  Right TM has monomeric inferior area with posterior inferior perforation ~ 5%, visuazlied middle ear space apperas heatly.  Posterior superior with thick tympanosclerosis.  Left tympanic membrane appears intact without effusion or retraction.  Eyes - Extraocular movements intact, sclera were not icteric or injected.  Mouth - Examination of the oral cavity showed pink, healthy oral mucosa. Dentition in good condition. No lesions or ulcerations noted. The tongue was mobile and " midline.   Throat - The walls of the oropharynx were smooth, pink, moist, symmetric, and had no lesions or ulcerations.  The tonsillar pillars and soft palate were symmetric. The uvula was midline on elevation.    Neck - Normal midline excursion of the laryngotracheal complex during swallowing.  Full range of motion on passive movement.  Palpation of the occipital, submental, submandibular, internal jugular chain, and supraclavicular nodes did not demonstrate any abnormal lymph nodes or masses.  Palpation of the thyroid was soft and smooth, with no nodules or goiter appreciated.  The trachea was mobile and midline.  Nose - External contour is symmetric, no gross deflection or scars.  Nasal mucosa is pink and moist with no abnormal mucus.  The septum and turbinates were evaluated with nasal speculum, the inferior turbinates continue to be enlarged, no polyps, masses, or purulence noted on examination.         Assessment and Plan:       ICD-10-CM    1. Perforation of tympanic membrane, right  H72.91       2. Mixed conductive and sensorineural hearing loss of right ear with restricted hearing of left ear  H90.A31       3. Sensorineural hearing loss (SNHL) of left ear with restricted hearing of right ear  H90.A22           Follow up in 3 months with Marely Galvez   Audiogram ordered to be done before appointment with Marely Galvez.     Keep right ear dry   Start Flonase once a day 2 sprays into each nostril for 3 months     Marely Galvez NP-C  [facility]

## 2022-11-29 NOTE — LETTER
11/29/2022         RE: Laney Guzman  [address]  [facility] [place] [address]        Dear Colleague,    Thank you for referring your patient, Laney Guzman, to the [facility]. Please see a copy of my visit note below.    Otolaryngology Note         Chief Complaint:     Patient presents with:  Follow Up: 3 week follow up / Ear Check/ Polyp of right middle ear            History of Present Illness:     Laney Guzman is a 67 year old female seen today for follow-up plugged sensation in the right ear.  I saw her last in ENT on 11/8/2022.  At that time the right ear had thick dried otorrhea along the posterior inferior portion of the canal that was removed and noted an aural polyp under the otorrhea.  I was able to suction away the polyp and remove it.  She did have some mild oozing of blood after removal of the top polyp.  There was a microperforation of the inferior posterior tympanic membrane.  Was treated with Ciprodex.  Nasopharyngoscopy showed bilateral enlarged inferior turbinates otherwise normal with a clear nasopharynx    Today she reports that the right ear improving, less plugging sensation.  Feels that her hearing is improving.   Completed drops.  No pain or drainage.    She does have some nasal congestion.      Audiogram completed 11/8/2022:  Tympanograms are Type A for left ear suggesting normal eardrum mobility and Type B with large volume right ear-repeatable.  Acoustic Reflex Thresholds at 1000 Hz are present only ipsilateral right.  Thresholds are normal sloping to mild sensorineural left and normal right sloping to severe mixed loss.  Speech reception thresholds are in good agreement with pure tone average.  Word discrimination scores are excellent at supra-thresholds level.         Medications:     Current Outpatient Rx   Medication Sig Dispense Refill     acetaminophen-codeine (TYLENOL #3) 300-30 MG tablet  (Patient not taking: Reported on 11/4/2022)        amitriptyline (ELAVIL) 25 MG tablet Take 1 tablet (25 mg) by mouth At Bedtime 30 tablet 0     amitriptyline (ELAVIL) 50 MG tablet Take 50 mg by mouth daily       amoxicillin-clavulanate (AUGMENTIN) 500-125 MG tablet  (Patient not taking: Reported on 11/4/2022)       bacitracin 500 UNIT/GM external ointment Apply topically 2 times daily (Patient not taking: Reported on 9/16/2022) 113.4 g 3     cefdinir (OMNICEF) 300 MG capsule Take 1 capsule (300 mg) by mouth 2 times daily 20 capsule 0     Cholecalciferol (VITAMIN D PO) Take 1 tablet by mouth daily       CRANBERRY Take 1 by mouth daily       diphenoxylate-atropine (LOMOTIL) 2.5-0.025 MG per tablet Take 2 tablets by mouth 4 times daily as needed for diarrhea (Patient not taking: Reported on 11/4/2022) 30 tablet 0     estradiol (VAGIFEM) 10 MCG TABS vaginal tablet Place 1 tablet (10 mcg) vaginally twice a week (Patient not taking: Reported on 11/4/2022) 24 tablet 3     fish oil-omega-3 fatty acids 1000 MG capsule Take 1 by oral route every day       fluconazole (DIFLUCAN) 150 MG tablet  (Patient not taking: Reported on 11/4/2022)       Ginger, Zingiber officinalis, (GINGER ROOT) 550 MG CAPS Take 550 mg by mouth daily (Patient not taking: Reported on 9/16/2022)       L-FORMULA LYSINE HCL PO Take 1 tablet by mouth daily       Loperamide HCl (IMODIUM A-D PO) Take by mouth as needed (Patient not taking: Reported on 11/4/2022)       Multiple Vitamin (DAILY MULTIVITAMIN PO) Take 1 tablet by oral route every day with food       travoprost BAK FREE (TRAVATAN Z) 0.004 % ophthalmic solution Apply 1 drop to eye daily       UNKNOWN TO PATIENT Eye drops for Glaucoma. Name unknown. 1 drop to each eye daily       valACYclovir (VALTREX) 1000 mg tablet TAKE2 TABLETS BY MOUTH 2 TIMES DAILY AS NEEDED (Patient not taking: Reported on 9/16/2022) 4 tablet 1            Allergies:     Allergies: Augmentin          Past Medical History:     Past Medical History:   Diagnosis Date      "Glaucoma      Menopausal or female climacteric states 08/21/2000     Routine general medical examination at [facility] 08/18/2000            Past Surgical History:     Past Surgical History:   Procedure Laterality Date     COLONOSCOPY  3-     COLONOSCOPY N/A 5/15/2017    Procedure: COLONOSCOPY;  COLONOSCOPY WITH BIOPSIES;  Surgeon: David Vieira MD;  Location: [facility] OR     excision of ganglion cyst       phlebectomies x11 R leg,x12 L leg      Bilateral, varicose veins       ENT family history reviewed         Social History:     Social History     Tobacco Use     Smoking status: Never     Smokeless tobacco: Never     Tobacco comments:     no passive exposure   Substance Use Topics     Alcohol use: Yes     Comment: socially     Drug use: No            Review of Systems:     ROS: See HPI         Physical Exam:     /78 (BP Location: Right arm, Patient Position: Sitting, Cuff Size: Adult Regular)   Pulse 96   Temp 98.1  F (36.7  C) (Tympanic)   Ht 1.575 m (5' 2\")   Wt 72.6 kg (160 lb)   SpO2 98%   BMI 29.26 kg/m      General - The patient is well nourished and well developed, and appears to have good nutritional status.  Alert and oriented to person and place, answers questions and cooperates with examination appropriately.   Head and Face - Normocephalic and atraumatic, with no gross asymmetry noted.  The facial nerve is intact, with strong symmetric movements.  Voice and Breathing - The patient was breathing comfortably without the use of accessory muscles. There was no wheezing, stridor. The patients voice was clear and strong, and had appropriate pitch and quality.  Ears - External ear normal. The ears were examined under binocular microscopy and with ototsocpe.  Right TM has monomeric inferior area with posterior inferior perforation ~ 5%, visuazlied middle ear space apperas heatly.  Posterior superior with thick tympanosclerosis.  Left tympanic membrane appears intact without effusion or " retraction.  Eyes - Extraocular movements intact, sclera were not icteric or injected.  Mouth - Examination of the oral cavity showed pink, healthy oral mucosa. Dentition in good condition. No lesions or ulcerations noted. The tongue was mobile and midline.   Throat - The walls of the oropharynx were smooth, pink, moist, symmetric, and had no lesions or ulcerations.  The tonsillar pillars and soft palate were symmetric. The uvula was midline on elevation.    Neck - Normal midline excursion of the laryngotracheal complex during swallowing.  Full range of motion on passive movement.  Palpation of the occipital, submental, submandibular, internal jugular chain, and supraclavicular nodes did not demonstrate any abnormal lymph nodes or masses.  Palpation of the thyroid was soft and smooth, with no nodules or goiter appreciated.  The trachea was mobile and midline.  Nose - External contour is symmetric, no gross deflection or scars.  Nasal mucosa is pink and moist with no abnormal mucus.  The septum and turbinates were evaluated with nasal speculum, the inferior turbinates continue to be enlarged, no polyps, masses, or purulence noted on examination.         Assessment and Plan:       ICD-10-CM    1. Perforation of tympanic membrane, right  H72.91       2. Mixed conductive and sensorineural hearing loss of right ear with restricted hearing of left ear  H90.A31       3. Sensorineural hearing loss (SNHL) of left ear with restricted hearing of right ear  H90.A22           Follow up in 3 months with Marely Galvez   Audiogram ordered to be done before appointment with Marely Galvez.     Keep right ear dry   Start Flonase once a day 2 sprays into each nostril for 3 months     Marely MUHAMMAD  [facility]        Again, thank you for allowing me to participate in the care of your patient.        Sincerely,        Marely Galvez NP

## 2022-12-08 ENCOUNTER — OFFICE VISIT (OUTPATIENT)
Dept: FAMILY MEDICINE | Facility: OTHER | Age: 67
End: 2022-12-08
Attending: PHYSICIAN ASSISTANT
Payer: COMMERCIAL

## 2022-12-08 VITALS
OXYGEN SATURATION: 99 % | BODY MASS INDEX: 28.35 KG/M2 | TEMPERATURE: 98.6 F | DIASTOLIC BLOOD PRESSURE: 90 MMHG | SYSTOLIC BLOOD PRESSURE: 122 MMHG | WEIGHT: 155 LBS | RESPIRATION RATE: 16 BRPM | HEART RATE: 104 BPM

## 2022-12-08 DIAGNOSIS — Z23 ENCOUNTER FOR IMMUNIZATION: ICD-10-CM

## 2022-12-08 DIAGNOSIS — Z00.00 ENCOUNTER FOR MEDICARE ANNUAL WELLNESS EXAM: ICD-10-CM

## 2022-12-08 DIAGNOSIS — Z13.220 SCREENING FOR HYPERLIPIDEMIA: ICD-10-CM

## 2022-12-08 DIAGNOSIS — Z00.00 ROUTINE GENERAL MEDICAL EXAMINATION AT A HEALTH CARE FACILITY: Primary | ICD-10-CM

## 2022-12-08 DIAGNOSIS — N95.2 ATROPHIC VAGINITIS: ICD-10-CM

## 2022-12-08 DIAGNOSIS — K58.0 IRRITABLE BOWEL SYNDROME WITH DIARRHEA: ICD-10-CM

## 2022-12-08 PROCEDURE — 90677 PCV20 VACCINE IM: CPT

## 2022-12-08 PROCEDURE — G0438 PPPS, INITIAL VISIT: HCPCS | Performed by: PHYSICIAN ASSISTANT

## 2022-12-08 RX ORDER — ESTRADIOL 10 UG/1
10 INSERT VAGINAL
Qty: 24 TABLET | Refills: 3 | Status: SHIPPED | OUTPATIENT
Start: 2022-12-08

## 2022-12-08 ASSESSMENT — ENCOUNTER SYMPTOMS
DIZZINESS: 0
NAUSEA: 0
CONSTIPATION: 0
FREQUENCY: 0
MYALGIAS: 0
PALPITATIONS: 0
DYSURIA: 0
NERVOUS/ANXIOUS: 0
ABDOMINAL PAIN: 0
HEADACHES: 0
HEMATOCHEZIA: 0
BREAST MASS: 0
SORE THROAT: 0
EYE PAIN: 0
SHORTNESS OF BREATH: 0
WEAKNESS: 0
COUGH: 0
ARTHRALGIAS: 0
JOINT SWELLING: 0
HEMATURIA: 0
CHILLS: 0
PARESTHESIAS: 0
DIARRHEA: 1
FEVER: 0
HEARTBURN: 1

## 2022-12-08 ASSESSMENT — PAIN SCALES - GENERAL: PAINLEVEL: NO PAIN (0)

## 2022-12-08 ASSESSMENT — ACTIVITIES OF DAILY LIVING (ADL): CURRENT_FUNCTION: NO ASSISTANCE NEEDED

## 2022-12-08 NOTE — PROGRESS NOTES
"SUBJECTIVE:   Laney is a 67 year old who presents for Preventive Visit.2  Patient has been advised of split billing requirements and indicates understanding: Yes  Are you in the first 12 months of your Medicare coverage?  Yes,      Healthy Habits:     In general, how would you rate your overall health?  Good    Frequency of exercise:  4-5 days/week    Duration of exercise:  45-60 minutes    Do you usually eat at least 4 servings of fruit and vegetables a day, include whole grains    & fiber and avoid regularly eating high fat or \"junk\" foods?  Yes    Taking medications regularly:  Yes    Medication side effects:  None    Ability to successfully perform activities of daily living:  No assistance needed    Home Safety:  No safety concerns identified    Hearing Impairment:  No hearing concerns    In the past 6 months, have you been bothered by leaking of urine?  No    In general, how would you rate your overall mental or emotional health?  Good      PHQ-2 Total Score: 0    Additional concerns today:  No      Have you ever done Advance Care Planning? (For example, a Health Directive, POLST, or a discussion with a medical provider or your loved ones about your wishes): No, advance care planning information given to patient to review.  Patient declined advance care planning discussion at this time.    Sees ENT for perforated eardrum and for hearing loss     Fall risk  Fallen 2 or more times in the past year?: No  Any fall with injury in the past year?: No    Cognitive Screening   1) Repeat 3 items (Leader, Season, Table)    2) Clock draw: NORMAL  3) 3 item recall: Recalls 2 objects   Results: NORMAL clock, 1-2 items recalled: COGNITIVE IMPAIRMENT LESS LIKELY    Mini-CogTM Copyright AGUILAR Ochoa. Licensed by the author for use in [facility]; reprinted with permission (alexandra@.[facility]). All rights reserved.      Do you have sleep apnea, excessive snoring or daytime drowsiness?: no    Reviewed and updated as needed " this visit by clinical staff   Tobacco  Allergies               Reviewed and updated as needed this visit by Provider                 Social History     Tobacco Use     Smoking status: Never     Smokeless tobacco: Never     Tobacco comments:     no passive exposure   Substance Use Topics     Alcohol use: Yes     Comment: socially         Alcohol Use 12/8/2022   Prescreen: >3 drinks/day or >7 drinks/week? No   Prescreen: >3 drinks/day or >7 drinks/week? -       Current providers sharing in care for this patient include: Patient Care Team:  Lazara Tate PA as PCP - General  Lazara Tate PA as Assigned PCP  Marely Galvez NP as Assigned Surgical Provider    The following health maintenance items are reviewed in Epic and correct as of today:  Health Maintenance   Topic Date Due     Pneumococcal Vaccine: 65+ Years (2 - PCV) 04/27/2010     ZOSTER IMMUNIZATION (3 of 3) 11/10/2021     ADVANCE CARE PLANNING  02/10/2022     MEDICARE ANNUAL WELLNESS VISIT  09/15/2022     LIPID  09/17/2022     DEXA  09/22/2023     FALL RISK ASSESSMENT  12/08/2023     MAMMO SCREENING  09/09/2024     COLORECTAL CANCER SCREENING  05/15/2027     DTAP/TDAP/TD IMMUNIZATION (11 - Td or Tdap) 09/15/2031     HEPATITIS C SCREENING  Completed     HIV SCREENING  Completed     PHQ-2 (once per calendar year)  Completed     INFLUENZA VACCINE  Completed     COVID-19 Vaccine  Completed     IPV IMMUNIZATION  Aged Out     MENINGITIS IMMUNIZATION  Aged Out     PAP  Discontinued     BP Readings from Last 3 Encounters:   12/08/22 (!) 122/90   11/29/22 124/78   11/08/22 132/76    Wt Readings from Last 3 Encounters:   12/08/22 70.3 kg (155 lb)   11/29/22 72.6 kg (160 lb)   11/08/22 72.1 kg (159 lb)                  Patient Active Problem List   Diagnosis     ACP (advance care planning)     Other specified glaucoma     Irritable bowel syndrome with diarrhea     Atrophic vaginitis     Past Surgical History:   Procedure Laterality Date      COLONOSCOPY  3-     COLONOSCOPY N/A 5/15/2017    Procedure: COLONOSCOPY;  COLONOSCOPY WITH BIOPSIES;  Surgeon: David Vieira MD;  Location: [facility]     excision of ganglion cyst       phlebectomies x11 R leg,x12 L leg      Bilateral, varicose veins       Social History     Tobacco Use     Smoking status: Never     Smokeless tobacco: Never     Tobacco comments:     no passive exposure   Substance Use Topics     Alcohol use: Yes     Comment: socially     Family History   Problem Relation Age of Onset     Cancer Father      Other - See Comments Father 82        Emphysema, cause of death     Arthritis Mother 62        Rheumatoid Arthritis, cause of death     Other - See Comments Sister         Stomach aneurysm         Current Outpatient Medications   Medication Sig Dispense Refill     amitriptyline (ELAVIL) 50 MG tablet Take 50 mg by mouth daily       CRANBERRY Take 1 by mouth daily       fish oil-omega-3 fatty acids 1000 MG capsule Take 1 by oral route every day       L-FORMULA LYSINE HCL PO Take 1 tablet by mouth daily       Multiple Vitamin (DAILY MULTIVITAMIN PO) Take 1 tablet by oral route every day with food       travoprost MICHELLE FREE (TRAVATAN Z) 0.004 % ophthalmic solution Apply 1 drop to eye daily       acetaminophen-codeine (TYLENOL #3) 300-30 MG tablet  (Patient not taking: Reported on 11/4/2022)       amitriptyline (ELAVIL) 25 MG tablet Take 1 tablet (25 mg) by mouth At Bedtime (Patient not taking: Reported on 12/8/2022) 30 tablet 0     amoxicillin-clavulanate (AUGMENTIN) 500-125 MG tablet  (Patient not taking: Reported on 11/4/2022)       bacitracin 500 UNIT/GM external ointment Apply topically 2 times daily (Patient not taking: Reported on 9/16/2022) 113.4 g 3     cefdinir (OMNICEF) 300 MG capsule Take 1 capsule (300 mg) by mouth 2 times daily (Patient not taking: Reported on 12/8/2022) 20 capsule 0     Cholecalciferol (VITAMIN D PO) Take 1 tablet by mouth daily (Patient not taking: Reported on  12/8/2022)       diphenoxylate-atropine (LOMOTIL) 2.5-0.025 MG per tablet Take 2 tablets by mouth 4 times daily as needed for diarrhea (Patient not taking: Reported on 11/4/2022) 30 tablet 0     estradiol (VAGIFEM) 10 MCG TABS vaginal tablet Place 1 tablet (10 mcg) vaginally twice a week (Patient not taking: Reported on 11/4/2022) 24 tablet 3     fluconazole (DIFLUCAN) 150 MG tablet  (Patient not taking: Reported on 11/4/2022)       Ginger, Zingiber officinalis, (GINGER ROOT) 550 MG CAPS Take 550 mg by mouth daily (Patient not taking: Reported on 12/8/2022)       Loperamide HCl (IMODIUM A-D PO) Take by mouth as needed (Patient not taking: Reported on 11/4/2022)       UNKNOWN TO PATIENT Eye drops for Glaucoma. Name unknown. 1 drop to each eye daily (Patient not taking: Reported on 12/8/2022)       valACYclovir (VALTREX) 1000 mg tablet TAKE2 TABLETS BY MOUTH 2 TIMES DAILY AS NEEDED (Patient not taking: Reported on 9/16/2022) 4 tablet 1     Allergies   Allergen Reactions     Augmentin Cramps     Last 3 Pap and HPV Results:   PAP / HPV Latest Ref Rng & Units 3/12/2018 11/25/2014   PAP (Historical) - NIL NIL   HPV16 NEG:Negative Negative -   HPV18 NEG:Negative Negative -   HRHPV NEG:Negative Negative -     Any new diagnosis of family breast, ovarian, or bowel cancer? No    FHS-7:   Breast CA Risk Assessment (FHS-7) 9/9/2022   Did any of your first-degree relatives have breast or ovarian cancer? No   Did any of your relatives have bilateral breast cancer? No   Did any man in your family have breast cancer? No   Did any woman in your family have breast and ovarian cancer? No   Did any woman in your family have breast cancer before age 50 y? No   Do you have 2 or more relatives with breast and/or ovarian cancer? No   Do you have 2 or more relatives with breast and/or bowel cancer? No     Mammogram Screening: Recommended mammography every 1-2 years with patient discussion and risk factor consideration. She is up to date  "  Pertinent mammograms are reviewed under the imaging tab.    Review of Systems   Constitutional: Negative for chills and fever.   HENT: Negative for congestion, ear pain, hearing loss and sore throat.    Eyes: Negative for pain and visual disturbance.   Respiratory: Negative for cough and shortness of breath.    Cardiovascular: Negative for chest pain, palpitations and peripheral edema.   Gastrointestinal: Positive for diarrhea and heartburn. Negative for abdominal pain, constipation, hematochezia and nausea.   Breasts:  Negative for tenderness, breast mass and discharge.   Genitourinary: Negative for dysuria, frequency, genital sores, hematuria, pelvic pain, urgency, vaginal bleeding and vaginal discharge.   Musculoskeletal: Negative for arthralgias, joint swelling and myalgias.   Skin: Negative for rash.   Neurological: Negative for dizziness, weakness, headaches and paresthesias.   Psychiatric/Behavioral: Negative for mood changes. The patient is not nervous/anxious.          OBJECTIVE:   BP (!) 122/90 (BP Location: Left arm, Patient Position: Sitting, Cuff Size: Adult Regular)   Pulse 104   Temp 98.6  F (37  C) (Tympanic)   Resp 16   Wt 70.3 kg (155 lb)   SpO2 99%   BMI 28.35 kg/m   Estimated body mass index is 28.35 kg/m  as calculated from the following:    Height as of 11/29/22: 1.575 m (5' 2\").    Weight as of this encounter: 70.3 kg (155 lb).  Physical Exam  GENERAL: healthy, alert and no distress  EYES: Eyes grossly normal to inspection, PERRL and conjunctivae and sclerae normal  HENT: ear canals and left TM normal, right ear has perforation if followed by ENT, nose and mouth without ulcers or lesions  NECK: no adenopathy, no asymmetry, masses, or scars and thyroid normal to palpation  RESP: lungs clear to auscultation - no rales, rhonchi or wheezes  BREAST: normal without masses, tenderness or nipple discharge and no palpable axillary masses or adenopathy  CV: regular rate and rhythm, normal S1 S2, " no S3 or S4, no murmur, click or rub, no peripheral edema and peripheral pulses strong  ABDOMEN: soft, nontender, no hepatosplenomegaly, no masses and bowel sounds normal  MS: no gross musculoskeletal defects noted, no edema  SKIN: no suspicious lesions or rashes  NEURO: Normal strength and tone, mentation intact and speech normal  PSYCH: mentation appears normal, affect normal/bright    Diagnostic Test Results:  Labs  will be reviewed in Epic. She is going to     ASSESSMENT / PLAN:   (Z00.00)  Encounter for Medicare annual wellness exam (primary encounter diagnosis)  Comment: Doing very well. She is receivingpneumonia  Vaccine today, then will be up to date on vaccines. She is going to get zoster vaccine at local pharmacy. She is not due for pap smear, last 3 paps were normal.  She is being seen by ENT for a perforated right TM. She is going to make lab appointment and return for labs.   Plan: Return in 1 year.    Lipid panel reflex to direct LDL Non-fasting,          CBC with platelets and differential,          Comprehensive metabolic panel (BMP + Alb, Alk          Phos, ALT, AST, Total. Bili, TP), TSH with free         T4 reflex, UA reflex to Microscopic and Culture        -  [place]      (Z13.220) Screening for hyperlipidemia  Comment: Due  For screening.   Plan: Lipid panel reflex to direct LDL Non-fasting    (N95.2) Atrophic vaginitis  Plan:estradiol (VAGIFEM) 10 MCG TABS vaginal tablet     (K58.0) Irritable bowel syndrome with diarrhea  Comment: Sees gastroenterology regularly, next appointment is next  month.     (Z23) Encounter for immunization  Comment: Due for pneumonia vaccine. She is going to get zoster vaccine at local pharmacy.   Plan: Pneumococcal 20 Valent Conjugate (PCV20),          Patient has been advised of split billing requirements and indicates understanding: Yes      COUNSELING:  Reviewed preventive health counseling, as reflected in patient instructions  Special attention given to:        "Regular exercise       Healthy diet/nutrition       Vision screening       Hearing screening       Dental care       Bladder control       Fall risk prevention       Osteoporosis prevention/bone health      BMI:   Estimated body mass index is 28.35 kg/m  as calculated from the following:    Height as of 11/29/22: 1.575 m (5' 2\").    Weight as of this encounter: 70.3 kg (155 lb).         She reports that she has never smoked. She has never used smokeless tobacco.      Appropriate preventive services were discussed with this patient, including applicable screening as appropriate for cardiovascular disease, diabetes, osteopenia/osteoporosis, and glaucoma.  As appropriate for age/gender, discussed screening for colorectal cancer, prostate cancer, breast cancer, and cervical cancer. Checklist reviewing preventive services available has been given to the patient.    Reviewed patients plan of care and provided an AVS. The Basic Care Plan (routine screening as documented in Health Maintenance) for Laney meets the Care Plan requirement. This Care Plan has been established and reviewed with the Patient.      EZEQUIEL Rucker-S  [facility]    I saw and examined this patient.  I have read through the note and made appropriate changes and agree with the  assessment and plan.     EZEQUIEL Luther  [facility] [place]    Identified Health Risks:  "

## 2022-12-08 NOTE — PATIENT INSTRUCTIONS
Preventive Health Recommendations    See your health care provider every year to    Review health changes.     Discuss preventive care.      Review your medicines if your doctor has prescribed any.      You no longer need a yearly Pap test unless you've had an abnormal Pap test in the past 10 years. If you have vaginal symptoms, such as bleeding or discharge, be sure to talk with your provider about a Pap test.      Every 1 to 2 years, have a mammogram.  If you are over 69, talk with your health care provider about whether or not you want to continue having screening mammograms.      Every 10 years, have a colonoscopy. Or, have a yearly FIT test (stool test). These exams will check for colon cancer.       Have a cholesterol test every 5 years, or more often if your doctor advises it.       Have a diabetes test (fasting glucose) every three years. If you are at risk for diabetes, you should have this test more often.       At age 65, have a bone density scan (DEXA) to check for osteoporosis (brittle bone disease).    Shots:    Get a flu shot each year.    Get a tetanus shot every 10 years.    Talk to your doctor about your pneumonia vaccines. There are now two you should receive - Pneumovax (PPSV 23) and Prevnar (PCV 13).    Talk to your pharmacist about the shingles vaccine.    Talk to your doctor about the hepatitis B vaccine.    Nutrition:     Eat at least 5 servings of fruits and vegetables each day.      Eat whole-grain bread, whole-wheat pasta and brown rice instead of white grains and rice.      Get adequate about Calcium and Vitamin D.     Lifestyle    Exercise at least 150 minutes a week (30 minutes a day, 5 days a week). This will help you control your weight and prevent disease.      Limit alcohol to one drink per day.      No smoking.       Wear sunscreen to prevent skin cancer.       See your dentist twice a year for an exam and cleaning.      See your eye doctor every 1 to 2 years to screen for  conditions such as glaucoma, macular degeneration, cataracts, etc.    Personalized Prevention Plan  You are due for the preventive services outlined below.  Your care team is available to assist you in scheduling these services.  If you have already completed any of these items, please share that information with your care team to update in your medical record.    Health Maintenance Due   Topic Date Due     Pneumococcal Vaccine (2 - PCV) 04/27/2010     Zoster (Shingles) Vaccine (3 of 3) 11/10/2021     Discuss Advance Care Planning  02/10/2022     Annual Wellness Visit  09/15/2022     Cholesterol Lab  09/17/2022     Patient Education   Personalized Prevention Plan  You are due for the preventive services outlined below.  Your care team is available to assist you in scheduling these services.  If you have already completed any of these items, please share that information with your care team to update in your medical record.  Health Maintenance Due   Topic Date Due     Pneumococcal Vaccine (2 - PCV) 04/27/2010     Zoster (Shingles) Vaccine (3 of 3) 11/10/2021     Cholesterol Lab  09/17/2022

## 2022-12-09 ENCOUNTER — LAB (OUTPATIENT)
Dept: LAB | Facility: OTHER | Age: 67
End: 2022-12-09
Payer: COMMERCIAL

## 2022-12-09 DIAGNOSIS — Z13.220 SCREENING FOR HYPERLIPIDEMIA: ICD-10-CM

## 2022-12-09 DIAGNOSIS — Z00.00 ROUTINE GENERAL MEDICAL EXAMINATION AT A HEALTH CARE FACILITY: ICD-10-CM

## 2022-12-09 LAB
ALBUMIN SERPL BCG-MCNC: 4.2 G/DL (ref 3.5–5.2)
ALBUMIN UR-MCNC: NEGATIVE MG/DL
ALP SERPL-CCNC: 74 U/L (ref 35–104)
ALT SERPL W P-5'-P-CCNC: 27 U/L (ref 10–35)
ANION GAP SERPL CALCULATED.3IONS-SCNC: 10 MMOL/L (ref 7–15)
APPEARANCE UR: CLEAR
AST SERPL W P-5'-P-CCNC: 24 U/L (ref 10–35)
BASOPHILS # BLD AUTO: 0 10E3/UL (ref 0–0.2)
BASOPHILS NFR BLD AUTO: 0 %
BILIRUB SERPL-MCNC: 0.5 MG/DL
BILIRUB UR QL STRIP: NEGATIVE
BUN SERPL-MCNC: 9.9 MG/DL (ref 8–23)
CALCIUM SERPL-MCNC: 9.2 MG/DL (ref 8.8–10.2)
CHLORIDE SERPL-SCNC: 96 MMOL/L (ref 98–107)
CHOLEST SERPL-MCNC: 241 MG/DL
COLOR UR AUTO: NORMAL
CREAT SERPL-MCNC: 0.71 MG/DL (ref 0.51–0.95)
DEPRECATED HCO3 PLAS-SCNC: 27 MMOL/L (ref 22–29)
EOSINOPHIL # BLD AUTO: 0.1 10E3/UL (ref 0–0.7)
EOSINOPHIL NFR BLD AUTO: 1 %
ERYTHROCYTE [DISTWIDTH] IN BLOOD BY AUTOMATED COUNT: 13.1 % (ref 10–15)
GFR SERPL CREATININE-BSD FRML MDRD: >90 ML/MIN/1.73M2
GLUCOSE SERPL-MCNC: 90 MG/DL (ref 70–99)
GLUCOSE UR STRIP-MCNC: NEGATIVE MG/DL
HCT VFR BLD AUTO: 41.7 % (ref 35–47)
HDLC SERPL-MCNC: 76 MG/DL
HGB BLD-MCNC: 13.8 G/DL (ref 11.7–15.7)
HGB UR QL STRIP: NEGATIVE
IMM GRANULOCYTES # BLD: 0 10E3/UL
IMM GRANULOCYTES NFR BLD: 0 %
KETONES UR STRIP-MCNC: NEGATIVE MG/DL
LDLC SERPL CALC-MCNC: 102 MG/DL
LEUKOCYTE ESTERASE UR QL STRIP: NEGATIVE
LYMPHOCYTES # BLD AUTO: 2.4 10E3/UL (ref 0.8–5.3)
LYMPHOCYTES NFR BLD AUTO: 26 %
MCH RBC QN AUTO: 30.6 PG (ref 26.5–33)
MCHC RBC AUTO-ENTMCNC: 33.1 G/DL (ref 31.5–36.5)
MCV RBC AUTO: 93 FL (ref 78–100)
MONOCYTES # BLD AUTO: 0.6 10E3/UL (ref 0–1.3)
MONOCYTES NFR BLD AUTO: 6 %
NEUTROPHILS # BLD AUTO: 6.1 10E3/UL (ref 1.6–8.3)
NEUTROPHILS NFR BLD AUTO: 67 %
NITRATE UR QL: NEGATIVE
NONHDLC SERPL-MCNC: 165 MG/DL
NRBC # BLD AUTO: 0 10E3/UL
NRBC BLD AUTO-RTO: 0 /100
PH UR STRIP: 6.5 [PH] (ref 4.7–8)
PLATELET # BLD AUTO: 243 10E3/UL (ref 150–450)
POTASSIUM SERPL-SCNC: 4.2 MMOL/L (ref 3.4–5.3)
PROT SERPL-MCNC: 7.2 G/DL (ref 6.4–8.3)
RBC # BLD AUTO: 4.51 10E6/UL (ref 3.8–5.2)
SODIUM SERPL-SCNC: 133 MMOL/L (ref 136–145)
SP GR UR STRIP: 1 (ref 1–1.03)
T4 FREE SERPL-MCNC: 1.03 NG/DL (ref 0.9–1.7)
TRIGL SERPL-MCNC: 315 MG/DL
TSH SERPL DL<=0.005 MIU/L-ACNC: 5.17 UIU/ML (ref 0.3–4.2)
UROBILINOGEN UR STRIP-MCNC: NORMAL MG/DL
WBC # BLD AUTO: 9.2 10E3/UL (ref 4–11)

## 2022-12-09 PROCEDURE — 85025 COMPLETE CBC W/AUTO DIFF WBC: CPT | Mod: ZL

## 2022-12-09 PROCEDURE — 80053 COMPREHEN METABOLIC PANEL: CPT | Mod: ZL

## 2022-12-09 PROCEDURE — 84443 ASSAY THYROID STIM HORMONE: CPT | Mod: ZL

## 2022-12-09 PROCEDURE — 36415 COLL VENOUS BLD VENIPUNCTURE: CPT | Mod: ZL

## 2022-12-09 PROCEDURE — 84439 ASSAY OF FREE THYROXINE: CPT | Mod: ZL

## 2022-12-09 PROCEDURE — 80061 LIPID PANEL: CPT | Mod: ZL

## 2022-12-09 PROCEDURE — 81003 URINALYSIS AUTO W/O SCOPE: CPT | Mod: ZL

## 2022-12-13 ENCOUNTER — TELEPHONE (OUTPATIENT)
Dept: FAMILY MEDICINE | Facility: OTHER | Age: 67
End: 2022-12-13

## 2022-12-13 ENCOUNTER — MYC MEDICAL ADVICE (OUTPATIENT)
Dept: FAMILY MEDICINE | Facility: OTHER | Age: 67
End: 2022-12-13

## 2022-12-13 DIAGNOSIS — E03.9 ACQUIRED HYPOTHYROIDISM: Primary | ICD-10-CM

## 2022-12-13 RX ORDER — LEVOTHYROXINE SODIUM 25 UG/1
25 TABLET ORAL DAILY
Qty: 90 TABLET | Refills: 0 | Status: SHIPPED | OUTPATIENT
Start: 2022-12-13 | End: 2023-04-04

## 2022-12-13 NOTE — RESULT ENCOUNTER NOTE
Ldl is good the TGL are not.  HDL is a good cholesterol and anything over 50 is very good.  Tsh is elevated and medications were sent in.  Her  blood pressure might come down once her thyroid is in place. Please notifty

## 2022-12-13 NOTE — LETTER
[facility]  [address] [place], [place], [address]  [phone]         December 14, 2022    Laney Guzman                                                                                                                                                       [id number]   [facility] [address]              Dear Laney,    I have reviewed your recent laboratory tests.  I am pleased to report that the following tests were normal or unchanged:   Complete blood count  Hemocult  Hemoglobin    The following tests were abnormal:  Sodium  Thyroid Function  Cholesterol Panel    I recommend the following:  Please attempt to improve your diet even further.  You need a low dose thyroid medication that I have sent in for you and recheck your thyroid studies in 2 months.   Your cholesterol is ok with the LDL.  Our goal would be to have this under 100 and you are at 102.  You also need to work on Triglycerides at over 300.  Dietary changes are helpful here with using less fat and more whole foods found on the outside isle of the Grocery store.   We will see you back for testing in 2 months on Thyroid.  Lipids will be done in office in 6 months.     If you have any problems or concerns, please call myself or my nurse at [phone].      Sincerely,    Lazara Tate RN, PA-C

## 2022-12-13 NOTE — TELEPHONE ENCOUNTER
Pt calling and has not gotten her lab results from 12.9.2022.    She was seen the day before in the office.    Please review.    Call pt back [phone]      Itzel Guevara RN

## 2023-01-04 ENCOUNTER — TRANSFERRED RECORDS (OUTPATIENT)
Dept: HEALTH INFORMATION MANAGEMENT | Facility: CLINIC | Age: 68
End: 2023-01-04

## 2023-01-31 ENCOUNTER — TRANSFERRED RECORDS (OUTPATIENT)
Dept: HEALTH INFORMATION MANAGEMENT | Facility: CLINIC | Age: 68
End: 2023-01-31

## 2023-02-28 ENCOUNTER — OFFICE VISIT (OUTPATIENT)
Dept: AUDIOLOGY | Facility: OTHER | Age: 68
End: 2023-02-28
Attending: NURSE PRACTITIONER
Payer: COMMERCIAL

## 2023-02-28 DIAGNOSIS — H90.A22 SENSORINEURAL HEARING LOSS (SNHL) OF LEFT EAR WITH RESTRICTED HEARING OF RIGHT EAR: ICD-10-CM

## 2023-02-28 DIAGNOSIS — H72.91 PERFORATION OF TYMPANIC MEMBRANE, RIGHT: ICD-10-CM

## 2023-02-28 DIAGNOSIS — H90.A31 MIXED CONDUCTIVE AND SENSORINEURAL HEARING LOSS OF RIGHT EAR WITH RESTRICTED HEARING OF LEFT EAR: ICD-10-CM

## 2023-02-28 PROCEDURE — 92556 SPEECH AUDIOMETRY COMPLETE: CPT | Mod: 52,RT | Performed by: AUDIOLOGIST

## 2023-02-28 PROCEDURE — 92552 PURE TONE AUDIOMETRY AIR: CPT | Mod: 52,RT | Performed by: AUDIOLOGIST

## 2023-02-28 NOTE — PROGRESS NOTES
Audiology Evaluation Completed. Please refer SCANNED AUDIOGRAM and/or TYMPANOGRAM for BACKGROUND, RESULTS, RECOMMENDATIONS.      eRgi GUNN, [facility]  Audiologist #[id number]

## 2023-03-06 ENCOUNTER — OFFICE VISIT (OUTPATIENT)
Dept: OTOLARYNGOLOGY | Facility: OTHER | Age: 68
End: 2023-03-06
Attending: PHYSICIAN ASSISTANT
Payer: COMMERCIAL

## 2023-03-06 VITALS
HEIGHT: 62 IN | SYSTOLIC BLOOD PRESSURE: 132 MMHG | TEMPERATURE: 98.2 F | WEIGHT: 155 LBS | DIASTOLIC BLOOD PRESSURE: 70 MMHG | BODY MASS INDEX: 28.52 KG/M2 | HEART RATE: 104 BPM | OXYGEN SATURATION: 98 %

## 2023-03-06 DIAGNOSIS — H90.A31 MIXED CONDUCTIVE AND SENSORINEURAL HEARING LOSS OF RIGHT EAR WITH RESTRICTED HEARING OF LEFT EAR: ICD-10-CM

## 2023-03-06 DIAGNOSIS — H90.A22 SENSORINEURAL HEARING LOSS (SNHL) OF LEFT EAR WITH RESTRICTED HEARING OF RIGHT EAR: ICD-10-CM

## 2023-03-06 DIAGNOSIS — H72.91 PERFORATION OF TYMPANIC MEMBRANE, RIGHT: Primary | ICD-10-CM

## 2023-03-06 DIAGNOSIS — R09.81 CONGESTION OF PARANASAL SINUS: ICD-10-CM

## 2023-03-06 DIAGNOSIS — H92.11 OTORRHEA, RIGHT: ICD-10-CM

## 2023-03-06 PROCEDURE — 92504 EAR MICROSCOPY EXAMINATION: CPT | Performed by: PHYSICIAN ASSISTANT

## 2023-03-06 PROCEDURE — 99214 OFFICE O/P EST MOD 30 MIN: CPT | Mod: 25 | Performed by: PHYSICIAN ASSISTANT

## 2023-03-06 PROCEDURE — G0463 HOSPITAL OUTPT CLINIC VISIT: HCPCS

## 2023-03-06 RX ORDER — BUDESONIDE 0.5 MG/2ML
INHALANT ORAL
Qty: 120 ML | Refills: 1 | Status: SHIPPED | OUTPATIENT
Start: 2023-03-06 | End: 2023-06-27

## 2023-03-06 RX ORDER — CIPROFLOXACIN AND DEXAMETHASONE 3; 1 MG/ML; MG/ML
4 SUSPENSION/ DROPS AURICULAR (OTIC) 2 TIMES DAILY
Qty: 2.8 ML | Refills: 0 | Status: SHIPPED | OUTPATIENT
Start: 2023-03-06 | End: 2023-03-13

## 2023-03-06 ASSESSMENT — PAIN SCALES - GENERAL: PAINLEVEL: NO PAIN (1)

## 2023-03-06 NOTE — LETTER
3/6/2023         RE: Laney Guzman  [address]  [facility] [address]        Dear Colleague,    Thank you for referring your patient, Laney Guzman, to the [facility]. Please see a copy of my visit note below.    Chief Complaint   Patient presents with     Ear Problem     Follow up right TM perforation       Laney Guzman is a 67 year old female seen today for follow-up plugged sensation in the right ear.    Marely saw her last in ENT on 11/29/2022.  At that time the right ear had thick dried otorrhea along the posterior inferior portion of the canal that was removed and noted an aural polyp under the otorrhea.  I was able to suction away the polyp and remove it.  She did have some mild oozing of blood after removal of the top polyp.  There was a microperforation of the inferior posterior tympanic membrane.  Was treated with Ciprodex.  Nasopharyngoscopy showed bilateral enlarged inferior turbinates otherwise normal with a clear nasopharynx     Today she reports that the right ear improving, less plugging sensation.  Feels that her hearing is improving.     No pain or drainage.    She does have some nasal congestion.       Laney feels that her symptoms are ongoing for the last several months. She was treated with Amoxil and symptoms returned.       +Otalgia, intermittent numbness  +Dental guard, Clenching/ grinding.           Audiogram completed 11/8/2022:  Tympanograms are Type A for left ear suggesting normal eardrum mobility and Type B with large volume right ear-repeatable.  Acoustic Reflex Thresholds at 1000 Hz are present only ipsilateral right.  Thresholds are normal sloping to mild sensorineural left and normal right sloping to severe mixed loss.  Speech reception thresholds are in good agreement with pure tone average.  Word discrimination scores are excellent at supra-thresholds level.      Audiogram-2/28/2023  Right ear only  Tympanograms  Type B large  "volume, perforation right.  Thresholds are normal to severe mixed loss, right.  SRT=PTA  WRS-  Right- 100%@70dB          Past Medical History:   Diagnosis Date     Glaucoma      Menopausal or female climacteric states 08/21/2000     Routine general medical examination at [facility] 08/18/2000        Allergies   Allergen Reactions     Augmentin Cramps     Current Outpatient Medications   Medication     CRANBERRY     estradiol (VAGIFEM) 10 MCG TABS vaginal tablet     fish oil-omega-3 fatty acids 1000 MG capsule     L-FORMULA LYSINE HCL PO     levothyroxine (SYNTHROID/LEVOTHROID) 25 MCG tablet     Multiple Vitamin (DAILY MULTIVITAMIN PO)     travoprost MICHELLE FREE (TRAVATAN Z) 0.004 % ophthalmic solution     UNKNOWN TO PATIENT     No current facility-administered medications for this visit.     ROS- SEE HPI  /70   Pulse 104   Temp 98.2  F (36.8  C)   Ht 1.575 m (5' 2\")   Wt 70.3 kg (155 lb)   SpO2 98%   BMI 28.35 kg/m      General - The patient is well nourished and well developed, and appears to have good nutritional status.  Alert and oriented to person and place, answers questions and cooperates with examination appropriately.   Head and Face - Normocephalic and atraumatic, with no gross asymmetry noted.  The facial nerve is intact, with strong symmetric movements.  Voice and Breathing - The patient was breathing comfortably without the use of accessory muscles. There was no wheezing, stridor. The patients voice was clear and strong, and had appropriate pitch and quality.  Ears - External ear normal. The ears were examined under binocular microscopy and with ototsocpe.  Right TM has monomeric inferior area with posterior inferior perforation ~ 5%, visuazlied middle ear space apperas heatly.  Posterior superior with thick tympanosclerosis.  Along her right EAC there is dried debris which was suctioned.  There is scant otorrhea with mild edema along base of canal wall with perforation present. Left tympanic " membrane appears intact without effusion or retraction.  Eyes - Extraocular movements intact, sclera were not icteric or injected.  Mouth - Examination of the oral cavity showed pink, healthy oral mucosa. Dentition in good condition. No lesions or ulcerations noted. The tongue was mobile and midline.   Throat - The walls of the oropharynx were smooth, pink, moist, symmetric, and had no lesions or ulcerations.  The tonsillar pillars and soft palate were symmetric. The uvula was midline on elevation.    Neck - Normal midline excursion of the laryngotracheal complex during swallowing.  Full range of motion on passive movement.  Palpation of the occipital, submental, submandibular, internal jugular chain, and supraclavicular nodes did not demonstrate any abnormal lymph nodes or masses.  Palpation of the thyroid was soft and smooth, with no nodules or goiter appreciated.  The trachea was mobile and midline.  Nose - External contour is symmetric, no gross deflection or scars.  Nasal mucosa is pink and moist with no abnormal mucus.  The septum and turbinates were evaluated with nasal speculum, the inferior turbinates continue to be enlarged, no polyps, masses, or purulence noted on examination.       ASSESSMENT/ PLAN:    ICD-10-CM    1. Perforation of tympanic membrane, right  H72.91 ciprofloxacin-dexamethasone (CIPRODEX) 0.3-0.1 % otic suspension      2. Mixed conductive and sensorineural hearing loss of right ear with restricted hearing of left ear  H90.A31 ciprofloxacin-dexamethasone (CIPRODEX) 0.3-0.1 % otic suspension      3. Sensorineural hearing loss (SNHL) of left ear with restricted hearing of right ear  H90.A22       4. Congestion of paranasal sinus  R09.81 budesonide (PULMICORT) 0.5 MG/2ML neb solution      5. Otorrhea, right  H92.11           Start Ciprodex 4 drops twice daily for 7 days to right ear.  Recommended follow-up in 2 to 3 weeks to ensure resolution of the otorrhea and irritation along canal  wall.  Water precautions.    Consider CT imaging of ear if there is no improvement or continued otalgia.  Continue with dental guard use.  Start budesonide rinse 1-2 times daily.  Continue with Flonase 2 sprays each nostril daily.        Rocio Tuttle PA-C  ENT  [facility], [place]          Again, thank you for allowing me to participate in the care of your patient.        Sincerely,        Rocio Tuttle PA-C

## 2023-03-06 NOTE — PATIENT INSTRUCTIONS
Start Ciprodex 4 drops twice a day for 7 days to right ear.   Small collection drainage/ irritation along canal.   Water precautions.   Follow up in 2-3 weeks.   Consider CT scan of ear, if there is no improvement    Start Budesonide rinse. Rinse 1-2 times daily  Continue with Flonase.     Thank you for allowing Rocio Tuttle PA-C and our ENT team to participate in your care.  If your medications are too expensive, please give the nurse a call.  We can possibly change this medication.  If you have a scheduling or an appointment question please contact our Health Unit Coordinator at [phone], Ext. [phone].    ALL nursing questions or concerns can be directed to your ENT nurse at: [phone] [facility]     Budesonide nasal saline irrigation per instructions:  -Obtain Ajay Med Sinus rinse over the counter.    -Use warm distilled water and 2 packets of the salt solution that comes with the bottle, dissolve in bottle up to the 240 mL kyra.  -Add 1 vial of budesonide.  -Irrigate each side of your nose leaning over the sink, using 1/3 to 1/2 the volume of the bottle in each nostril every irrigation.  Irrigate 2 times daily.  -If additional rinses are needed/recommended, you may use the plan Ajay Med Sinus irrigation without the use of added budesonide

## 2023-03-06 NOTE — PROGRESS NOTES
Chief Complaint   Patient presents with     Ear Problem     Follow up right TM perforation       Laney Guzman is a 67 year old female seen today for follow-up plugged sensation in the right ear.    Marely saw her last in ENT on 11/29/2022.  At that time the right ear had thick dried otorrhea along the posterior inferior portion of the canal that was removed and noted an aural polyp under the otorrhea.  I was able to suction away the polyp and remove it.  She did have some mild oozing of blood after removal of the top polyp.  There was a microperforation of the inferior posterior tympanic membrane.  Was treated with Ciprodex.  Nasopharyngoscopy showed bilateral enlarged inferior turbinates otherwise normal with a clear nasopharynx     Today she reports that the right ear improving, less plugging sensation.  Feels that her hearing is improving.     No pain or drainage.    She does have some nasal congestion.       Laney feels that her symptoms are ongoing for the last several months. She was treated with Amoxil and symptoms returned.       +Otalgia, intermittent numbness  +Dental guard, Clenching/ grinding.           Audiogram completed 11/8/2022:  Tympanograms are Type A for left ear suggesting normal eardrum mobility and Type B with large volume right ear-repeatable.  Acoustic Reflex Thresholds at 1000 Hz are present only ipsilateral right.  Thresholds are normal sloping to mild sensorineural left and normal right sloping to severe mixed loss.  Speech reception thresholds are in good agreement with pure tone average.  Word discrimination scores are excellent at supra-thresholds level.      Audiogram-2/28/2023  Right ear only  Tympanograms  Type B large volume, perforation right.  Thresholds are normal to severe mixed loss, right.  SRT=PTA  WRS-  Right- 100%@70dB          Past Medical History:   Diagnosis Date     Glaucoma      Menopausal or female climacteric states 08/21/2000     Routine general medical  "examination at [facility] 08/18/2000        Allergies   Allergen Reactions     Augmentin Cramps     Current Outpatient Medications   Medication     CRANBERRY     estradiol (VAGIFEM) 10 MCG TABS vaginal tablet     fish oil-omega-3 fatty acids 1000 MG capsule     L-FORMULA LYSINE HCL PO     levothyroxine (SYNTHROID/LEVOTHROID) 25 MCG tablet     Multiple Vitamin (DAILY MULTIVITAMIN PO)     travoprost MICHELLE FREE (TRAVATAN Z) 0.004 % ophthalmic solution     UNKNOWN TO PATIENT     No current facility-administered medications for this visit.     ROS- SEE HPI  /70   Pulse 104   Temp 98.2  F (36.8  C)   Ht 1.575 m (5' 2\")   Wt 70.3 kg (155 lb)   SpO2 98%   BMI 28.35 kg/m      General - The patient is well nourished and well developed, and appears to have good nutritional status.  Alert and oriented to person and place, answers questions and cooperates with examination appropriately.   Head and Face - Normocephalic and atraumatic, with no gross asymmetry noted.  The facial nerve is intact, with strong symmetric movements.  Voice and Breathing - The patient was breathing comfortably without the use of accessory muscles. There was no wheezing, stridor. The patients voice was clear and strong, and had appropriate pitch and quality.  Ears - External ear normal. The ears were examined under binocular microscopy and with ototsocpe.  Right TM has monomeric inferior area with posterior inferior perforation ~ 5%, visuazlied middle ear space apperas heatly.  Posterior superior with thick tympanosclerosis.  Along her right EAC there is dried debris which was suctioned.  There is scant otorrhea with mild edema along base of canal wall with perforation present. Left tympanic membrane appears intact without effusion or retraction.  Eyes - Extraocular movements intact, sclera were not icteric or injected.  Mouth - Examination of the oral cavity showed pink, healthy oral mucosa. Dentition in good condition. No lesions or " ulcerations noted. The tongue was mobile and midline.   Throat - The walls of the oropharynx were smooth, pink, moist, symmetric, and had no lesions or ulcerations.  The tonsillar pillars and soft palate were symmetric. The uvula was midline on elevation.    Neck - Normal midline excursion of the laryngotracheal complex during swallowing.  Full range of motion on passive movement.  Palpation of the occipital, submental, submandibular, internal jugular chain, and supraclavicular nodes did not demonstrate any abnormal lymph nodes or masses.  Palpation of the thyroid was soft and smooth, with no nodules or goiter appreciated.  The trachea was mobile and midline.  Nose - External contour is symmetric, no gross deflection or scars.  Nasal mucosa is pink and moist with no abnormal mucus.  The septum and turbinates were evaluated with nasal speculum, the inferior turbinates continue to be enlarged, no polyps, masses, or purulence noted on examination.       ASSESSMENT/ PLAN:    ICD-10-CM    1. Perforation of tympanic membrane, right  H72.91 ciprofloxacin-dexamethasone (CIPRODEX) 0.3-0.1 % otic suspension      2. Mixed conductive and sensorineural hearing loss of right ear with restricted hearing of left ear  H90.A31 ciprofloxacin-dexamethasone (CIPRODEX) 0.3-0.1 % otic suspension      3. Sensorineural hearing loss (SNHL) of left ear with restricted hearing of right ear  H90.A22       4. Congestion of paranasal sinus  R09.81 budesonide (PULMICORT) 0.5 MG/2ML neb solution      5. Otorrhea, right  H92.11           Start Ciprodex 4 drops twice daily for 7 days to right ear.  Recommended follow-up in 2 to 3 weeks to ensure resolution of the otorrhea and irritation along canal wall.  Water precautions.    Consider CT imaging of ear if there is no improvement or continued otalgia.  Continue with dental guard use.  Start budesonide rinse 1-2 times daily.  Continue with Flonase 2 sprays each nostril daily.        Rocio Tuttle  TERE  ENT  [facility], [place]

## 2023-03-08 ENCOUNTER — TELEPHONE (OUTPATIENT)
Dept: OTOLARYNGOLOGY | Facility: OTHER | Age: 68
End: 2023-03-08

## 2023-03-08 NOTE — TELEPHONE ENCOUNTER
Patient is calling because she is wondering if she should use the Budesonide due to her glaucoma?  Please advise.

## 2023-03-29 ENCOUNTER — TELEPHONE (OUTPATIENT)
Dept: FAMILY MEDICINE | Facility: OTHER | Age: 68
End: 2023-03-29

## 2023-03-29 DIAGNOSIS — E03.9 ACQUIRED HYPOTHYROIDISM: Primary | ICD-10-CM

## 2023-03-29 NOTE — TELEPHONE ENCOUNTER
Patient is calling requesting a lab order to have her thyroid checked to see if the medication is still working. Please call patient and let her know that orders have been placed. [phone]

## 2023-03-30 NOTE — PROGRESS NOTES
Chief Complaint   Patient presents with     Ear Problem     Follow up right TM perforation, SNHL, and right otorrhea     Today she reports that the right ear improving, less plugging sensation.  Feels that her hearing is improving. No pain or drainage.    She does have some nasal congestion.    She has felt her pain has improved.   She has continued tinnitus in left ear.     +Dental guard, Clenching/ grinding.               Audiogram completed 11/8/2022:  Tympanograms are Type A for left ear suggesting normal eardrum mobility and Type B with large volume right ear-repeatable.  Acoustic Reflex Thresholds at 1000 Hz are present only ipsilateral right.  Thresholds are normal sloping to mild sensorineural left and normal right sloping to severe mixed loss.  Speech reception thresholds are in good agreement with pure tone average.  Word discrimination scores are excellent at supra-thresholds level.        Audiogram-2/28/2023  Right ear only  Tympanograms  Type B large volume, perforation right.  Thresholds are normal to severe mixed loss, right.  SRT=PTA  WRS-  Right- 100%@70dB          Past Medical History:   Diagnosis Date     Glaucoma      Menopausal or female climacteric states 08/21/2000     Routine general medical examination at [facility] 08/18/2000        Allergies   Allergen Reactions     Augmentin Cramps     Current Outpatient Medications   Medication     budesonide (PULMICORT) 0.5 MG/2ML neb solution     CRANBERRY     estradiol (VAGIFEM) 10 MCG TABS vaginal tablet     fish oil-omega-3 fatty acids 1000 MG capsule     L-FORMULA LYSINE HCL PO     levothyroxine (SYNTHROID/LEVOTHROID) 25 MCG tablet     Multiple Vitamin (DAILY MULTIVITAMIN PO)     travoprost MICHELLE FREE (TRAVATAN Z) 0.004 % ophthalmic solution     UNKNOWN TO PATIENT     No current facility-administered medications for this visit.     ROS- SEE HPI  /82 (BP Location: Right arm, Cuff Size: Adult Regular)   Pulse 101   Temp 97.4  F (36.3  C)  "(Tympanic)   Ht 1.575 m (5' 2\")   Wt 74.8 kg (165 lb)   SpO2 98%   BMI 30.18 kg/m      General - The patient is well nourished and well developed, and appears to have good nutritional status.  Alert and oriented to person and place, answers questions and cooperates with examination appropriately.   Head and Face - Normocephalic and atraumatic, with no gross asymmetry noted.  The facial nerve is intact, with strong symmetric movements.  Voice and Breathing - The patient was breathing comfortably without the use of accessory muscles. There was no wheezing, stridor. The patients voice was clear and strong, and had appropriate pitch and quality.  Ears - External ear normal. The ears were examined under binocular microscopy and with ototsocpe.  Right TM has monomeric inferior area with posterior inferior perforation ~ 5%, visuazlied middle ear space apperas heatly.  Posterior superior with thick tympanosclerosis.   Left tympanic membrane appears intact without effusion or retraction.  Eyes - Extraocular movements intact, sclera were not icteric or injected.  Mouth - Examination of the oral cavity showed pink, healthy oral mucosa. Dentition in good condition. No lesions or ulcerations noted. The tongue was mobile and midline.   Throat - The walls of the oropharynx were smooth, pink, moist, symmetric, and had no lesions or ulcerations.  The tonsillar pillars and soft palate were symmetric. The uvula was midline on elevation.    Neck - Normal midline excursion of the laryngotracheal complex during swallowing.  Full range of motion on passive movement.  Palpation of the occipital, submental, submandibular, internal jugular chain, and supraclavicular nodes did not demonstrate any abnormal lymph nodes or masses.  Palpation of the thyroid was soft and smooth, with no nodules or goiter appreciated.  The trachea was mobile and midline.  Nose - External contour is symmetric, no gross deflection or scars.  Nasal mucosa is pink " and moist with no abnormal mucus.  The septum and turbinates were evaluated with nasal speculum, the inferior turbinates continue to be enlarged, no polyps, masses, or purulence noted on examination.       ASSESSMENT/ PLAN:      ICD-10-CM    1. Perforation of tympanic membrane, right  H72.91       2. Mixed conductive and sensorineural hearing loss of right ear with restricted hearing of left ear  H90.A31       3. Sensorineural hearing loss (SNHL) of left ear with restricted hearing of right ear  H90.A22           Water precautions.     Consider CT imaging of ear if there is no improvement or continued otalgia.  Continue with dental guard use.   Continue  budesonide rinse 1-2 times daily.  Continue with Flonase 2 sprays each nostril daily.     Tinnitus information provided.     Follow up in 3 months, sooner if there are any concerns.     Rocio Tuttle PA-C  ENT  [facility], [place]

## 2023-03-31 ENCOUNTER — OFFICE VISIT (OUTPATIENT)
Dept: OTOLARYNGOLOGY | Facility: OTHER | Age: 68
End: 2023-03-31
Attending: PHYSICIAN ASSISTANT
Payer: COMMERCIAL

## 2023-03-31 ENCOUNTER — LAB (OUTPATIENT)
Dept: LAB | Facility: OTHER | Age: 68
End: 2023-03-31
Attending: PHYSICIAN ASSISTANT
Payer: COMMERCIAL

## 2023-03-31 ENCOUNTER — TELEPHONE (OUTPATIENT)
Dept: FAMILY MEDICINE | Facility: OTHER | Age: 68
End: 2023-03-31

## 2023-03-31 VITALS
WEIGHT: 165 LBS | HEART RATE: 101 BPM | DIASTOLIC BLOOD PRESSURE: 82 MMHG | BODY MASS INDEX: 30.36 KG/M2 | SYSTOLIC BLOOD PRESSURE: 136 MMHG | OXYGEN SATURATION: 98 % | TEMPERATURE: 97.4 F | HEIGHT: 62 IN

## 2023-03-31 DIAGNOSIS — H72.91 PERFORATION OF TYMPANIC MEMBRANE, RIGHT: Primary | ICD-10-CM

## 2023-03-31 DIAGNOSIS — E03.9 ACQUIRED HYPOTHYROIDISM: ICD-10-CM

## 2023-03-31 DIAGNOSIS — H90.A31 MIXED CONDUCTIVE AND SENSORINEURAL HEARING LOSS OF RIGHT EAR WITH RESTRICTED HEARING OF LEFT EAR: ICD-10-CM

## 2023-03-31 DIAGNOSIS — H90.A22 SENSORINEURAL HEARING LOSS (SNHL) OF LEFT EAR WITH RESTRICTED HEARING OF RIGHT EAR: ICD-10-CM

## 2023-03-31 LAB — TSH SERPL DL<=0.005 MIU/L-ACNC: 3.4 UIU/ML (ref 0.3–4.2)

## 2023-03-31 PROCEDURE — 99213 OFFICE O/P EST LOW 20 MIN: CPT | Mod: 25 | Performed by: PHYSICIAN ASSISTANT

## 2023-03-31 PROCEDURE — G0463 HOSPITAL OUTPT CLINIC VISIT: HCPCS

## 2023-03-31 PROCEDURE — 92504 EAR MICROSCOPY EXAMINATION: CPT | Performed by: PHYSICIAN ASSISTANT

## 2023-03-31 PROCEDURE — 36415 COLL VENOUS BLD VENIPUNCTURE: CPT | Mod: ZL

## 2023-03-31 PROCEDURE — 84443 ASSAY THYROID STIM HORMONE: CPT | Mod: ZL

## 2023-03-31 ASSESSMENT — PAIN SCALES - GENERAL: PAINLEVEL: NO PAIN (0)

## 2023-03-31 NOTE — TELEPHONE ENCOUNTER
Left message with patients spouse to have patient call back and schedule lab only to check thyroid.

## 2023-03-31 NOTE — TELEPHONE ENCOUNTER
Left message with Patient's spouse to have the patient call back to schedule lab appointment that she was requesting. Lea put in the orders.

## 2023-03-31 NOTE — LETTER
3/31/2023         RE: Laney uGzman  [address]  [facility] [address]        Dear Colleague,    Thank you for referring your patient, Laney Guzman, to the [facility] - [place]. Please see a copy of my visit note below.    Chief Complaint   Patient presents with     Ear Problem     Follow up right TM perforation, SNHL, and right otorrhea     Today she reports that the right ear improving, less plugging sensation.  Feels that her hearing is improving. No pain or drainage.    She does have some nasal congestion.    She has felt her pain has improved.   She has continued tinnitus in left ear.     +Dental guard, Clenching/ grinding.               Audiogram completed 11/8/2022:  Tympanograms are Type A for left ear suggesting normal eardrum mobility and Type B with large volume right ear-repeatable.  Acoustic Reflex Thresholds at 1000 Hz are present only ipsilateral right.  Thresholds are normal sloping to mild sensorineural left and normal right sloping to severe mixed loss.  Speech reception thresholds are in good agreement with pure tone average.  Word discrimination scores are excellent at supra-thresholds level.        Audiogram-2/28/2023  Right ear only  Tympanograms  Type B large volume, perforation right.  Thresholds are normal to severe mixed loss, right.  SRT=PTA  WRS-  Right- 100%@70dB          Past Medical History:   Diagnosis Date     Glaucoma      Menopausal or female climacteric states 08/21/2000     Routine general medical examination at [facility] 08/18/2000        Allergies   Allergen Reactions     Augmentin Cramps     Current Outpatient Medications   Medication     budesonide (PULMICORT) 0.5 MG/2ML neb solution     CRANBERRY     estradiol (VAGIFEM) 10 MCG TABS vaginal tablet     fish oil-omega-3 fatty acids 1000 MG capsule     L-FORMULA LYSINE HCL PO     levothyroxine (SYNTHROID/LEVOTHROID) 25 MCG tablet     Multiple Vitamin (DAILY MULTIVITAMIN PO)     travoprost  "MICHELLE FREE (TRAVATAN Z) 0.004 % ophthalmic solution     UNKNOWN TO PATIENT     No current facility-administered medications for this visit.     ROS- SEE HPI  /82 (BP Location: Right arm, Cuff Size: Adult Regular)   Pulse 101   Temp 97.4  F (36.3  C) (Tympanic)   Ht 1.575 m (5' 2\")   Wt 74.8 kg (165 lb)   SpO2 98%   BMI 30.18 kg/m      General - The patient is well nourished and well developed, and appears to have good nutritional status.  Alert and oriented to person and place, answers questions and cooperates with examination appropriately.   Head and Face - Normocephalic and atraumatic, with no gross asymmetry noted.  The facial nerve is intact, with strong symmetric movements.  Voice and Breathing - The patient was breathing comfortably without the use of accessory muscles. There was no wheezing, stridor. The patients voice was clear and strong, and had appropriate pitch and quality.  Ears - External ear normal. The ears were examined under binocular microscopy and with ototsocpe.  Right TM has monomeric inferior area with posterior inferior perforation ~ 5%, visuazlied middle ear space apperas heatly.  Posterior superior with thick tympanosclerosis.   Left tympanic membrane appears intact without effusion or retraction.  Eyes - Extraocular movements intact, sclera were not icteric or injected.  Mouth - Examination of the oral cavity showed pink, healthy oral mucosa. Dentition in good condition. No lesions or ulcerations noted. The tongue was mobile and midline.   Throat - The walls of the oropharynx were smooth, pink, moist, symmetric, and had no lesions or ulcerations.  The tonsillar pillars and soft palate were symmetric. The uvula was midline on elevation.    Neck - Normal midline excursion of the laryngotracheal complex during swallowing.  Full range of motion on passive movement.  Palpation of the occipital, submental, submandibular, internal jugular chain, and supraclavicular nodes did not " demonstrate any abnormal lymph nodes or masses.  Palpation of the thyroid was soft and smooth, with no nodules or goiter appreciated.  The trachea was mobile and midline.  Nose - External contour is symmetric, no gross deflection or scars.  Nasal mucosa is pink and moist with no abnormal mucus.  The septum and turbinates were evaluated with nasal speculum, the inferior turbinates continue to be enlarged, no polyps, masses, or purulence noted on examination.       ASSESSMENT/ PLAN:      ICD-10-CM    1. Perforation of tympanic membrane, right  H72.91       2. Mixed conductive and sensorineural hearing loss of right ear with restricted hearing of left ear  H90.A31       3. Sensorineural hearing loss (SNHL) of left ear with restricted hearing of right ear  H90.A22           Water precautions.     Consider CT imaging of ear if there is no improvement or continued otalgia.  Continue with dental guard use.   Continue  budesonide rinse 1-2 times daily.  Continue with Flonase 2 sprays each nostril daily.     Tinnitus information provided.     Follow up in 3 months, sooner if there are any concerns.     Rocio Tuttle PA-C  ENT  [facility], [place]             Again, thank you for allowing me to participate in the care of your patient.        Sincerely,        Rocio Tuttle PA-C

## 2023-03-31 NOTE — PATIENT INSTRUCTIONS
Continue with Rinses, Flonase  Water precautions right ear    Recheck ear in 3 months  If any concerns or drainage- return to ENT sooner.       Thank you for allowing Rocio Tuttle PA-C and our ENT team to participate in your care.  If your medications are too expensive, please give the nurse a call.  We can possibly change this medication.  If you have a scheduling or an appointment question please contact our Health Unit Coordinator at [phone], Ext. [phone].    ALL nursing questions or concerns can be directed to your ENT nurse at: [phone] [place]

## 2023-04-03 DIAGNOSIS — E03.9 ACQUIRED HYPOTHYROIDISM: ICD-10-CM

## 2023-04-04 ENCOUNTER — TELEPHONE (OUTPATIENT)
Dept: FAMILY MEDICINE | Facility: OTHER | Age: 68
End: 2023-04-04

## 2023-04-04 DIAGNOSIS — E03.9 ACQUIRED HYPOTHYROIDISM: ICD-10-CM

## 2023-04-04 RX ORDER — LEVOTHYROXINE SODIUM 25 UG/1
25 TABLET ORAL DAILY
Qty: 90 TABLET | Refills: 0 | OUTPATIENT
Start: 2023-04-04

## 2023-04-04 RX ORDER — LEVOTHYROXINE SODIUM 25 UG/1
25 TABLET ORAL DAILY
Qty: 90 TABLET | Refills: 0 | Status: SHIPPED | OUTPATIENT
Start: 2023-04-04 | End: 2023-06-30

## 2023-04-04 NOTE — TELEPHONE ENCOUNTER
Pt wondering if she needs to continue the Synthroid?    TSH   Date Value Ref Range Status   03/31/2023 3.40 0.30 - 4.20 uIU/mL Final   06/23/2021 2.45 0.40 - 4.00 mU/L Final     Please advise. Uses TAMICA Little.      Itzel Guevara RN

## 2023-05-23 ENCOUNTER — NURSE TRIAGE (OUTPATIENT)
Dept: FAMILY MEDICINE | Facility: OTHER | Age: 68
End: 2023-05-23

## 2023-05-23 ENCOUNTER — OFFICE VISIT (OUTPATIENT)
Dept: FAMILY MEDICINE | Facility: OTHER | Age: 68
End: 2023-05-23
Attending: NURSE PRACTITIONER
Payer: COMMERCIAL

## 2023-05-23 VITALS
SYSTOLIC BLOOD PRESSURE: 128 MMHG | HEART RATE: 80 BPM | TEMPERATURE: 98 F | WEIGHT: 165 LBS | DIASTOLIC BLOOD PRESSURE: 82 MMHG | BODY MASS INDEX: 30.18 KG/M2 | OXYGEN SATURATION: 97 %

## 2023-05-23 DIAGNOSIS — S90.861A TICK BITE OF RIGHT FOOT, INITIAL ENCOUNTER: Primary | ICD-10-CM
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
## 2023-05-23 PROCEDURE — 99213 OFFICE O/P EST LOW 20 MIN: CPT | Performed by: NURSE PRACTITIONER

## 2023-05-23 PROCEDURE — G0463 HOSPITAL OUTPT CLINIC VISIT: HCPCS

## 2023-05-23 PROCEDURE — G0463 HOSPITAL OUTPT CLINIC VISIT: HCPCS | Mod: 25

## 2023-05-23 RX ORDER — DOXYCYCLINE 100 MG/1
200 CAPSULE ORAL ONCE
Qty: 2 CAPSULE | Refills: 0 | Status: SHIPPED | OUTPATIENT
Start: 2023-05-23 | End: 2023-05-23

## 2023-05-23 ASSESSMENT — PAIN SCALES - GENERAL: PAINLEVEL: NO PAIN (0)

## 2023-05-23 NOTE — TELEPHONE ENCOUNTER
"    Reason for Disposition    Patient wants to be seen    Probable deer tick that was attached > 36 hours (or tick appears swollen, not flat)    Answer Assessment - Initial Assessment Questions  1. TYPE of TICK: \"Is it a wood tick or a deer tick?\" (e.g., deer tick, wood tick; unsure)      Deer tick  2. SIZE of TICK: \"How big is the tick?\" (e.g., size of poppy seed, apple seed, watermelon seed; unsure) Note: Deer ticks can be the size of a poppy seed (nymph) or an apple seed (adult).        Poppy seed  3. ENGORGED: \"Did the tick look flat or engorged (full, swollen)?\" (e.g., flat, engorged; unsure)      engorged  4. LOCATION: \"Where is the tick bite located?\"       right foot  5. ONSET: \"How long do you think the tick was attached before you removed it?\" (e.g., 5 hours, 2 days)       today  6. APPEARANCE of BITE or RASH: \"What does the site look like?\"      Rash red    7. PREGNANCY: \"Is there any chance you are pregnant?\" \"When was your last menstrual period?\"      no    Protocols used: TICK BITE-A-OH      "

## 2023-05-23 NOTE — PROGRESS NOTES
"  Assessment & Plan     Tick bite of right foot, initial encounter  Tick on >24 hours but unknown how long  Plan to treat with 1 time dose of doxy  - doxycycline hyclate (VIBRAMYCIN) 100 MG capsule; Take 2 capsules (200 mg) by mouth once for 1 dose       BMI:   Estimated body mass index is 30.18 kg/m  as calculated from the following:    Height as of 3/31/23: 1.575 m (5' 2\").    Weight as of this encounter: 74.8 kg (165 lb).       See Patient Instructions    No follow-ups on file.    [facility] - [place] Davison is a 68 year old, presenting for the following health issues:  Insect Bites (Tick bite)         View : No data to display.              HPI     Concern - Tick bite  Onset: today  Description: found tick today on right foot.  Not sure how long was attached - was bloody and engorged -   Progression of Symptoms:  Red spot          Review of Systems   CONSTITUTIONAL: NEGATIVE for fever, chills, change in weight  INTEGUMENTARY/SKIN: red swollen area to the top of the right foot - tick removed this morning   RESP: NEGATIVE for significant cough or SOB  CV: NEGATIVE for chest pain, palpitations or peripheral edema      Objective    /82   Pulse 80   Temp 98  F (36.7  C) (Tympanic)   Wt 74.8 kg (165 lb)   SpO2 97%   BMI 30.18 kg/m    Body mass index is 30.18 kg/m .  Physical Exam   GENERAL: healthy, alert and no distress  RESP: lungs clear to auscultation - no rales, rhonchi or wheezes  CV: regular rate and rhythm, normal S1 S2, no S3 or S4, no murmur, click or rub, no peripheral edema and peripheral pulses strong  SKIN: red raised area with center scab top of right foot                     "

## 2023-06-22 DIAGNOSIS — R09.81 CONGESTION OF PARANASAL SINUS: ICD-10-CM

## 2023-06-23 NOTE — TELEPHONE ENCOUNTER
Budesonide (Pulmicort) 0.5 MG/2ML neb solution     Last Written Prescription Date:  03/06/2023  Last Fill Quantity: 120,   # refills: 1  Last Office Visit: 05/23/2023

## 2023-06-27 RX ORDER — BUDESONIDE 0.5 MG/2ML
INHALANT ORAL
Qty: 120 ML | Refills: 1 | Status: SHIPPED | OUTPATIENT
Start: 2023-06-27 | End: 2023-11-13

## 2023-06-30 DIAGNOSIS — E03.9 ACQUIRED HYPOTHYROIDISM: ICD-10-CM

## 2023-06-30 RX ORDER — LEVOTHYROXINE SODIUM 25 UG/1
25 TABLET ORAL DAILY
Qty: 90 TABLET | Refills: 1 | Status: SHIPPED | OUTPATIENT
Start: 2023-06-30 | End: 2023-12-29

## 2023-07-19 ENCOUNTER — OFFICE VISIT (OUTPATIENT)
Dept: OTOLARYNGOLOGY | Facility: OTHER | Age: 68
End: 2023-07-19
Attending: PHYSICIAN ASSISTANT
Payer: COMMERCIAL

## 2023-07-19 VITALS
BODY MASS INDEX: 28.35 KG/M2 | TEMPERATURE: 98.4 F | OXYGEN SATURATION: 99 % | HEIGHT: 63 IN | WEIGHT: 160 LBS | SYSTOLIC BLOOD PRESSURE: 142 MMHG | DIASTOLIC BLOOD PRESSURE: 82 MMHG | HEART RATE: 105 BPM

## 2023-07-19 DIAGNOSIS — H90.A22 SENSORINEURAL HEARING LOSS (SNHL) OF LEFT EAR WITH RESTRICTED HEARING OF RIGHT EAR: ICD-10-CM

## 2023-07-19 DIAGNOSIS — H90.A31 MIXED CONDUCTIVE AND SENSORINEURAL HEARING LOSS OF RIGHT EAR WITH RESTRICTED HEARING OF LEFT EAR: ICD-10-CM

## 2023-07-19 DIAGNOSIS — H72.91 PERFORATION OF TYMPANIC MEMBRANE, RIGHT: Primary | ICD-10-CM

## 2023-07-19 DIAGNOSIS — R09.81 CONGESTION OF PARANASAL SINUS: ICD-10-CM

## 2023-07-19 PROCEDURE — G0463 HOSPITAL OUTPT CLINIC VISIT: HCPCS | Performed by: PHYSICIAN ASSISTANT

## 2023-07-19 PROCEDURE — 92504 EAR MICROSCOPY EXAMINATION: CPT | Performed by: PHYSICIAN ASSISTANT

## 2023-07-19 PROCEDURE — 99213 OFFICE O/P EST LOW 20 MIN: CPT | Mod: 25 | Performed by: PHYSICIAN ASSISTANT

## 2023-07-19 RX ORDER — FLUTICASONE PROPIONATE 50 MCG
2 SPRAY, SUSPENSION (ML) NASAL DAILY
Qty: 16 G | Refills: 11 | Status: SHIPPED | OUTPATIENT
Start: 2023-07-19

## 2023-07-19 ASSESSMENT — PAIN SCALES - GENERAL: PAINLEVEL: NO PAIN (0)

## 2023-07-19 NOTE — PROGRESS NOTES
Chief Complaint   Patient presents with     Ear Problem     Follow up right TM perforation     Today she reports that the right ear improving, less plugging sensation.  Feels that her hearing is improving. No pain or drainage.    She does have some nasal congestion.    She has felt her pain has improved.   She has continued tinnitus in left ear.   No recent otalgia.   She has continued stuffiness in her right ear intermittently.       She has felt her sinuses have increased in discomfort.No facial pain, pressure.   She has used Flonase intermittently with some relief.             Audiogram completed 11/8/2022:  Tympanograms are Type A for left ear suggesting normal eardrum mobility and Type B with large volume right ear-repeatable.  Acoustic Reflex Thresholds at 1000 Hz are present only ipsilateral right.  Thresholds are normal sloping to mild sensorineural left and normal right sloping to severe mixed loss.  Speech reception thresholds are in good agreement with pure tone average.  Word discrimination scores are excellent at supra-thresholds level.        Audiogram-2/28/2023  Right ear only  Tympanograms  Type B large volume, perforation right.  Thresholds are normal to severe mixed loss, right.  SRT=PTA  WRS-  Right- 100%@70dB         Past Medical History:   Diagnosis Date     Glaucoma      Menopausal or female climacteric states 08/21/2000     Routine general medical examination at [facility] 08/18/2000        Allergies   Allergen Reactions     Amoxicillin-Pot Clavulanate Cramps     Current Outpatient Medications   Medication     budesonide (PULMICORT) 0.5 MG/2ML neb solution     CRANBERRY     estradiol (VAGIFEM) 10 MCG TABS vaginal tablet     fish oil-omega-3 fatty acids 1000 MG capsule     L-FORMULA LYSINE HCL PO     levothyroxine (SYNTHROID/LEVOTHROID) 25 MCG tablet     Multiple Vitamin (DAILY MULTIVITAMIN PO)     travoprost MICHELLE FREE (TRAVATAN Z) 0.004 % ophthalmic solution     UNKNOWN TO PATIENT     No  "current facility-administered medications for this visit.     ROS- SEE HPI  BP (!) 142/82 (BP Location: Left arm, Patient Position: Sitting, Cuff Size: Adult Regular)   Pulse 105   Temp 98.4  F (36.9  C) (Tympanic)   Ht 1.6 m (5' 3\")   Wt 72.6 kg (160 lb)   SpO2 99%   BMI 28.34 kg/m      General - The patient is well nourished and well developed, and appears to have good nutritional status.  Alert and oriented to person and place, answers questions and cooperates with examination appropriately.   Head and Face - Normocephalic and atraumatic, with no gross asymmetry noted.  The facial nerve is intact, with strong symmetric movements.  Voice and Breathing - The patient was breathing comfortably without the use of accessory muscles. There was no wheezing, stridor. The patients voice was clear and strong, and had appropriate pitch and quality.  Ears - External ear normal. The ears were examined under binocular microscopy and with ototsocpe.  Right TM has monomeric inferior area with posterior inferior microperforation.   Posterior superior with thick tympanosclerosis.   Left tympanic membrane appears intact without effusion or retraction.  Eyes - Extraocular movements intact, sclera were not icteric or injected.  Mouth - Examination of the oral cavity showed pink, healthy oral mucosa. Dentition in good condition. No lesions or ulcerations noted. The tongue was mobile and midline.   Throat - The walls of the oropharynx were smooth, pink, moist, symmetric, and had no lesions or ulcerations.  The tonsillar pillars and soft palate were symmetric. The uvula was midline on elevation.    Neck - Normal midline excursion of the laryngotracheal complex during swallowing.  Full range of motion on passive movement.  Palpation of the occipital, submental, submandibular, internal jugular chain, and supraclavicular nodes did not demonstrate any abnormal lymph nodes or masses.  Palpation of the thyroid was soft and smooth, with " no nodules or goiter appreciated.  The trachea was mobile and midline.  Nose - External contour is symmetric, no gross deflection or scars.  Nasal mucosa is pink and moist with no abnormal mucus.  The septum and turbinates were evaluated with nasal speculum, the inferior turbinates continue to be enlarged, no polyps, masses, or purulence noted on examination.        ASSESSMENT/ PLAN:       ICD-10-CM    1. Perforation of tympanic membrane, right  H72.91       2. Congestion of paranasal sinus  R09.81 fluticasone (FLONASE) 50 MCG/ACT nasal spray      3. Mixed conductive and sensorineural hearing loss of right ear with restricted hearing of left ear  H90.A31       4. Sensorineural hearing loss (SNHL) of left ear with restricted hearing of right ear  H90.A22             Perforation has greatly improved, microperforation present  Recheck in 4-6 months  Water precautions until next visit    Use Ajay med rinses  Start Flonase 2 sprays daily.   If sinus or nasal symptoms worsen, consider sinus imaging.       Rocio Tuttle PA-C  ENT  [facility], [place]

## 2023-07-19 NOTE — PATIENT INSTRUCTIONS
Continue with Ajay med rinses. Rinse 1-2 times daily  Use Flonase 2 sprays to each nostril daily.     Ear has greatly improved  Small microperforation present, this should continue to heal  Continue with water precautions  Follow up in 4-6 months       Thank you for allowing Rocio Tuttle PA-C and our ENT team to participate in your care.  If your medications are too expensive, please give the nurse a call.  We can possibly change this medication.  If you have a scheduling or an appointment question please contact our Health Unit Coordinator at [phone], Ext. [phone].    ALL nursing questions or concerns can be directed to your ENT nurse at: [phone] [place]

## 2023-07-19 NOTE — LETTER
7/19/2023         RE: Laney Guzman  [address]  [facility] [place] [address]        Dear Colleague,    Thank you for referring your patient, Laney Guzman, to the [facility] - [place]. Please see a copy of my visit note below.    Chief Complaint   Patient presents with     Ear Problem     Follow up right TM perforation     Today she reports that the right ear improving, less plugging sensation.  Feels that her hearing is improving. No pain or drainage.    She does have some nasal congestion.    She has felt her pain has improved.   She has continued tinnitus in left ear.   No recent otalgia.   She has continued stuffiness in her right ear intermittently.       She has felt her sinuses have increased in discomfort.No facial pain, pressure.   She has used Flonase intermittently with some relief.             Audiogram completed 11/8/2022:  Tympanograms are Type A for left ear suggesting normal eardrum mobility and Type B with large volume right ear-repeatable.  Acoustic Reflex Thresholds at 1000 Hz are present only ipsilateral right.  Thresholds are normal sloping to mild sensorineural left and normal right sloping to severe mixed loss.  Speech reception thresholds are in good agreement with pure tone average.  Word discrimination scores are excellent at supra-thresholds level.        Audiogram-2/28/2023  Right ear only  Tympanograms  Type B large volume, perforation right.  Thresholds are normal to severe mixed loss, right.  SRT=PTA  WRS-  Right- 100%@70dB         Past Medical History:   Diagnosis Date     Glaucoma      Menopausal or female climacteric states 08/21/2000     Routine general medical examination at [facility] 08/18/2000        Allergies   Allergen Reactions     Amoxicillin-Pot Clavulanate Cramps     Current Outpatient Medications   Medication     budesonide (PULMICORT) 0.5 MG/2ML neb solution     CRANBERRY     estradiol (VAGIFEM) 10 MCG TABS vaginal tablet     fish  "oil-omega-3 fatty acids 1000 MG capsule     L-FORMULA LYSINE HCL PO     levothyroxine (SYNTHROID/LEVOTHROID) 25 MCG tablet     Multiple Vitamin (DAILY MULTIVITAMIN PO)     travoprost MICHELLE FREE (TRAVATAN Z) 0.004 % ophthalmic solution     UNKNOWN TO PATIENT     No current facility-administered medications for this visit.     ROS- SEE HPI  BP (!) 142/82 (BP Location: Left arm, Patient Position: Sitting, Cuff Size: Adult Regular)   Pulse 105   Temp 98.4  F (36.9  C) (Tympanic)   Ht 1.6 m (5' 3\")   Wt 72.6 kg (160 lb)   SpO2 99%   BMI 28.34 kg/m      General - The patient is well nourished and well developed, and appears to have good nutritional status.  Alert and oriented to person and place, answers questions and cooperates with examination appropriately.   Head and Face - Normocephalic and atraumatic, with no gross asymmetry noted.  The facial nerve is intact, with strong symmetric movements.  Voice and Breathing - The patient was breathing comfortably without the use of accessory muscles. There was no wheezing, stridor. The patients voice was clear and strong, and had appropriate pitch and quality.  Ears - External ear normal. The ears were examined under binocular microscopy and with ototsocpe.  Right TM has monomeric inferior area with posterior inferior microperforation.   Posterior superior with thick tympanosclerosis.   Left tympanic membrane appears intact without effusion or retraction.  Eyes - Extraocular movements intact, sclera were not icteric or injected.  Mouth - Examination of the oral cavity showed pink, healthy oral mucosa. Dentition in good condition. No lesions or ulcerations noted. The tongue was mobile and midline.   Throat - The walls of the oropharynx were smooth, pink, moist, symmetric, and had no lesions or ulcerations.  The tonsillar pillars and soft palate were symmetric. The uvula was midline on elevation.    Neck - Normal midline excursion of the laryngotracheal complex during " swallowing.  Full range of motion on passive movement.  Palpation of the occipital, submental, submandibular, internal jugular chain, and supraclavicular nodes did not demonstrate any abnormal lymph nodes or masses.  Palpation of the thyroid was soft and smooth, with no nodules or goiter appreciated.  The trachea was mobile and midline.  Nose - External contour is symmetric, no gross deflection or scars.  Nasal mucosa is pink and moist with no abnormal mucus.  The septum and turbinates were evaluated with nasal speculum, the inferior turbinates continue to be enlarged, no polyps, masses, or purulence noted on examination.        ASSESSMENT/ PLAN:       ICD-10-CM    1. Perforation of tympanic membrane, right  H72.91       2. Congestion of paranasal sinus  R09.81 fluticasone (FLONASE) 50 MCG/ACT nasal spray      3. Mixed conductive and sensorineural hearing loss of right ear with restricted hearing of left ear  H90.A31       4. Sensorineural hearing loss (SNHL) of left ear with restricted hearing of right ear  H90.A22             Perforation has greatly improved, microperforation present  Recheck in 4-6 months  Water precautions until next visit    Use Ajay med rinses  Start Flonase 2 sprays daily.   If sinus or nasal symptoms worsen, consider sinus imaging.       Rocio Tuttle PA-C  ENT  [facility], [place]          Again, thank you for allowing me to participate in the care of your patient.        Sincerely,        Rocio Tuttle PA-C

## 2023-08-13 ENCOUNTER — OFFICE VISIT (OUTPATIENT)
Dept: FAMILY MEDICINE | Facility: OTHER | Age: 68
End: 2023-08-13
Payer: COMMERCIAL

## 2023-08-13 VITALS
WEIGHT: 158.9 LBS | SYSTOLIC BLOOD PRESSURE: 120 MMHG | HEART RATE: 94 BPM | OXYGEN SATURATION: 98 % | BODY MASS INDEX: 27.13 KG/M2 | TEMPERATURE: 98.3 F | DIASTOLIC BLOOD PRESSURE: 88 MMHG | RESPIRATION RATE: 12 BRPM | HEIGHT: 64 IN

## 2023-08-13 DIAGNOSIS — H66.002 NON-RECURRENT ACUTE SUPPURATIVE OTITIS MEDIA OF LEFT EAR WITHOUT SPONTANEOUS RUPTURE OF TYMPANIC MEMBRANE: Primary | ICD-10-CM

## 2023-08-13 DIAGNOSIS — U07.1 CLINICAL DIAGNOSIS OF COVID-19: ICD-10-CM

## 2023-08-13 PROCEDURE — G0463 HOSPITAL OUTPT CLINIC VISIT: HCPCS

## 2023-08-13 PROCEDURE — 99213 OFFICE O/P EST LOW 20 MIN: CPT

## 2023-08-13 RX ORDER — CEFDINIR 300 MG/1
300 CAPSULE ORAL 2 TIMES DAILY
Qty: 14 CAPSULE | Refills: 0 | Status: SHIPPED | OUTPATIENT
Start: 2023-08-13 | End: 2023-08-20

## 2023-08-13 ASSESSMENT — PAIN SCALES - GENERAL: PAINLEVEL: NO PAIN (1)

## 2023-08-13 NOTE — NURSING NOTE
"Pt presents to  for L ear pain since Friday. Pt sees ENT specialist in [place], but ear has been bothering her more since she tested positive with Covid on Friday, 8/11/23. Pt has been taking Tylenol PRN for pain.    Chief Complaint   Patient presents with    Otalgia     L ear       FOOD SECURITY SCREENING QUESTIONS  Hunger Vital Signs:  Within the past 12 months we worried whether our food would run out before we got money to buy more. Never  Within the past 12 months the food we bought just didn't last and we didn't have money to get more. Never  Arina Deashaynaon 8/13/2023 2:18 PM      Initial /88 (BP Location: Left arm, Patient Position: Sitting, Cuff Size: Adult Regular)   Pulse 94   Temp 98.3  F (36.8  C) (Tympanic)   Resp 12   Ht 1.613 m (5' 3.5\")   Wt 72.1 kg (158 lb 14.4 oz)   SpO2 98%   BMI 27.71 kg/m   Estimated body mass index is 27.71 kg/m  as calculated from the following:    Height as of this encounter: 1.613 m (5' 3.5\").    Weight as of this encounter: 72.1 kg (158 lb 14.4 oz).  Medication Reconciliation: complete    Arina Cheng  "

## 2023-08-13 NOTE — PATIENT INSTRUCTIONS
COVID-19 Outpatient Treatments  Your care team can help you find the best treatments for COVID-19. Talk to a health care provider or refer to the FDA medicine fact sheets below.  Important: You can't have Paxlovid or molnupiravir if you're starting the medicine more than 5 days after your symptoms have started.  Paxlovid: https://www.fda.gov/media/820060/download  Molnupiravir (Lagevrio): https://www.fda.gov/media/445317/download  Paxlovid (nimatrelvir and ritonavir)  How it works  Two medicines (nirmatrelvir and ritonavir) are taken together. They stop the virus from growing. Less amount of virus is easier for your body to fight.  Benefits  Lowers risk of a hospital stay or death from COVID-19.  How to take  Medicine comes in a daily container with both medicine tablets. Take by mouth twice daily (once in the morning, once at night) for 5 days.  The number of tablets to take varies by patient.  Don't chew or break capsules. Swallow whole.  When to take  Take as soon as possible after positive COVID-19 test result, and within 5 days of your first symptoms.  Who can take it  Patients must be 12 years or older, weigh at least 88 pounds, and have tested positive for COVID-19. Paxlovid is the preferred treatment for pregnant patients.  Possible side effects  Can cause altered sense of taste, diarrhea (loose, watery stools), high blood pressure, muscle aches.  Medicine conflicts  Some medicines may conflict with Paxlovid and may cause serious side effects.  Tell your care team about all the medicines you take, including prescription and over-the-counter medicines, vitamins, and herbal supplements.  Your care team will review your medicines to make sure that you can safely take Paxlovid.  Cautions  Paxlovid is not advised for patients with severe kidney or liver disease. If you have kidney or liver problems, the dose may need to be changed.  If you're pregnant or breastfeeding, talk to your care team about your  options.  If you take hormonal birth control (such as the Pill), then you or your partner should also use a non-hormonal form of birth control (such as a condom). Keep doing this for 1 menstrual cycle after your last dose of Paxlovid.  Molnupiravir (Lagevrio)  How it works  Stops the virus from growing. Less amount of virus is easier for your body to fight.  Benefits  Lowers risk of a hospital stay or death from COVID-19.  How to take  Take 4 capsules by mouth every 12 hours (4 in the morning and 4 at night) for 5 days. Don't chew or break capsules. Swallow whole.  When to take  Take as soon as possible after positive COVID-19 test result, and within 5 days of your first symptoms.  Who can take it  Patients must be 18 years or older and have tested positive for COVID-19.  Possible side effects  Diarrhea (loose, watery stools), nausea (feeling sick to your stomach), dizziness, headaches.  Medicine conflicts  Right now, there are no known conflicts with other drugs. But tell your care team about all medicines you take.  Cautions  This medicine is not advised for patients who are pregnant.  If you are someone who could become pregnant, use trusted birth control until 4 days after your last dose of molnupiravir.  If your partner could become pregnant, you should use trusted birth control until 3 months after your last dose of molnupiravir.  For informational purposes only. Not to replace the advice of your health care provider. Copyright   2022 [facility]. All rights reserved. Clinically reviewed by Caro Olivera, PharmD, BCACP. [employer] [id number] - REV 12/22.      You have an ear infection (acute otitis media).     Please take your antibiotics as ordered. Complete the full dose even if you are feeling better. You may take your antibiotics with food.     You may take a daily probiotic while on antibiotics.    Follow up if symptoms are worsening or if symptoms are not improving within 2 days of starting  antibiotics.

## 2023-08-13 NOTE — PROGRESS NOTES
ASSESSMENT/PLAN:    (H66.002) Non-recurrent acute suppurative otitis media of left ear without spontaneous rupture of tympanic membrane  (primary encounter diagnosis)  Comment: Patient recently tested positive for COVID and has been having left-sided otalgia for a couple of days.  On exam left TM with bulging and purulence.  She has been seeing ENT for the right ear, looks good today.  I recommend treatment with oral antibiotics at this time.  She does have an Augmentin allergy so we will select cefdinir.  Plan: cefdinir (OMNICEF) 300 MG capsule  You have an ear infection (acute otitis media).     Please take your antibiotics as ordered. Complete the full dose even if you are feeling better. You may take your antibiotics with food.     You may take a daily probiotic while on antibiotics.    Follow up if symptoms are worsening or if symptoms are not improving within 2 days of starting antibiotics.     (U07.1) Clinical diagnosis of COVID-19  Comment: Patient tested positive for COVID on 8/11/2023, her symptoms began on 8/10/2023.  She is high risk due to age.  She denies having any concerning symptoms such as shortness of breath or chest pain.  Renal function has been good, most recently GFR was over 90 in December.  I reviewed the risks and benefits of Paxlovid with her and she would like to initiate therapy today.  She is within the window so we will start.  Not on any statin or blood thinner.  Plan: nirmatrelvir and ritonavir (PAXLOVID) 300         mg/100 mg therapy pack  Symptomatic treatment - Encouraged fluids, salt water gargles, honey (only if greater than 1 year in age due to risk of botulism), elevation, humidifier, sinus rinse/netti pot, lozenges, tea, topical vapor rub, popsicles, rest, etc     Discussed warning signs/symptoms indicative of need to f/u    Follow up if symptoms persist or worsen or concerns    I have reviewed the nursing notes.  I have reviewed the findings, diagnosis, plan and need for  follow up with the patient.    I explained my diagnostic considerations and recommendations to the patient, who voiced understanding and agreement with the treatment plan. All questions were answered. We discussed potential side effects of any prescribed or recommended therapies, as well as expectations for response to treatments.    KEO MONTOYA CNP  8/13/2023  2:24 PM    HPI:    Laney Guzman is a 68 year old female  who presents to [facility] today for concerns of otalgia.    Tested positive for COVID on 8/11/2023.  Symptoms began 8/10/23. She had a cough, rhinorrhea, and body aches. No shortness of breath.  No chest pain.  She is now having otalgia to the left side.  She sees an ENT in [place] but the ear has been bothering her more since testing positive for COVID.    Allergy to Augmentin.    Past Medical History:   Diagnosis Date    Glaucoma     Menopausal or female climacteric states 08/21/2000    Routine general medical examination at [facility] 08/18/2000     Past Surgical History:   Procedure Laterality Date    COLONOSCOPY  3-    COLONOSCOPY N/A 5/15/2017    Procedure: COLONOSCOPY;  COLONOSCOPY WITH BIOPSIES;  Surgeon: David Vieira MD;  Location: [facility] OR    excision of ganglion cyst      phlebectomies x11 R leg,x12 L leg      Bilateral, varicose veins     Social History     Tobacco Use    Smoking status: Never    Smokeless tobacco: Never    Tobacco comments:     no passive exposure   Substance Use Topics    Alcohol use: Yes     Comment: socially     Current Outpatient Medications   Medication Sig Dispense Refill    budesonide (PULMICORT) 0.5 MG/2ML neb solution MAKE 240 ML LUX MED SINUS IRRIGATION MIX 2 ML VIAL OFBUDESONIDE 0.5 MG AND RINSETWICE DAILY 120 mL 1    CRANBERRY Take 1 by mouth daily      estradiol (VAGIFEM) 10 MCG TABS vaginal tablet Place 1 tablet (10 mcg) vaginally twice a week 24 tablet 3    fish oil-omega-3 fatty acids 1000 MG capsule Take 1 by oral route every  "day      fluticasone (FLONASE) 50 MCG/ACT nasal spray Spray 2 sprays into both nostrils daily 16 g 11    L-FORMULA LYSINE HCL PO Take 1 tablet by mouth daily      levothyroxine (SYNTHROID/LEVOTHROID) 25 MCG tablet TAKE 1 TABLET (25 MCG) BY MOUTH DAILY 90 tablet 1    Multiple Vitamin (DAILY MULTIVITAMIN PO) Take 1 tablet by oral route every day with food      travoprost MICHELLE FREE (TRAVATAN Z) 0.004 % ophthalmic solution Apply 1 drop to eye daily      UNKNOWN TO PATIENT Eye drops for Glaucoma. Name unknown. 1 drop to each eye daily       Allergies   Allergen Reactions    Amoxicillin-Pot Clavulanate Cramps     Past medical history, past surgical history, current medications and allergies reviewed and accurate to the best of my knowledge.      ROS:  Refer to HPI    /88 (BP Location: Left arm, Patient Position: Sitting, Cuff Size: Adult Regular)   Pulse 94   Temp 98.3  F (36.8  C) (Tympanic)   Resp 12   Ht 1.613 m (5' 3.5\")   Wt 72.1 kg (158 lb 14.4 oz)   SpO2 98%   BMI 27.71 kg/m      EXAM:  General Appearance: Well appearing 68 year old female, appropriate appearance for age. No acute distress   Ears: Left TM intact, with bulging and purulence.  Right TM intact, translucent with bony landmarks appreciated, no erythema, no effusion, no bulging, no purulence.  Left auditory canal clear.  Right auditory canal clear.  Normal external ears, non tender.  Eyes: conjunctivae normal without erythema or irritation, corneas clear, no drainage or crusting, no eyelid swelling, pupils equal   Oropharynx: moist mucous membranes, posterior pharynx without erythema,  no exudates or petechiae, no post nasal drip seen, no trismus, voice clear.    Sinuses:  No sinus tenderness upon palpation of the frontal or maxillary sinuses  Nose:  Bilateral nares: no erythema, no edema, no drainage or congestion   Neck: supple without adenopathy  Respiratory: normal chest wall and respirations.  Normal effort.  Clear to auscultation " bilaterally, no wheezing, crackles or rhonchi.  No increased work of breathing.  No cough appreciated.  Cardiac: RRR with no murmurs  Musculoskeletal:  Equal movement of bilateral upper extremities.  Equal movement of bilateral lower extremities.  Normal gait.    Dermatological: no rashes noted of exposed skin  Neuro: Alert and oriented to person, place, and time.  Cranial nerves II-XII grossly intact with no focal or lateralizing deficits.  Muscle tone normal.  Gait normal. No tremor.   Psychological: normal affect, alert, oriented, and pleasant.

## 2023-10-10 ENCOUNTER — ANCILLARY PROCEDURE (OUTPATIENT)
Dept: MAMMOGRAPHY | Facility: OTHER | Age: 68
End: 2023-10-10
Attending: PHYSICIAN ASSISTANT
Payer: COMMERCIAL

## 2023-10-10 DIAGNOSIS — Z12.31 VISIT FOR SCREENING MAMMOGRAM: ICD-10-CM

## 2023-10-10 PROCEDURE — 77067 SCR MAMMO BI INCL CAD: CPT | Mod: TC

## 2023-10-11 ENCOUNTER — TELEPHONE (OUTPATIENT)
Dept: MAMMOGRAPHY | Facility: OTHER | Age: 68
End: 2023-10-11

## 2023-11-13 DIAGNOSIS — R09.81 CONGESTION OF PARANASAL SINUS: ICD-10-CM

## 2023-11-13 RX ORDER — BUDESONIDE 0.5 MG/2ML
INHALANT ORAL
Qty: 120 ML | Refills: 4 | Status: SHIPPED | OUTPATIENT
Start: 2023-11-13

## 2023-11-13 NOTE — TELEPHONE ENCOUNTER
Budesonide (PULMICORT) 0.5 MG/2 ML neb solution      Last Written Prescription Date:  6/27/23  Last Fill Quantity: 120 ml,   # refills: 1  Last Office Visit: 12/8/22  Future Office visit:    Next 5 appointments (look out 90 days)      Jan 05, 2024  4:00 PM  (Arrive by 3:45 PM)  PHYSICAL with EZEQUIEL Denson  [facility] [place] ([facility] [place] ) [address]  [place] [address]  [phone]     Jan 23, 2024  1:45 PM  (Arrive by 1:30 PM)  Return Visit with Rocio Tuttle PA-C  [facility] [place] ([facility] [place] ) [address]  [place] [address]  [phone]

## 2023-12-29 DIAGNOSIS — E03.9 ACQUIRED HYPOTHYROIDISM: ICD-10-CM

## 2023-12-29 RX ORDER — LEVOTHYROXINE SODIUM 25 UG/1
25 TABLET ORAL DAILY
Qty: 90 TABLET | Refills: 1 | Status: SHIPPED | OUTPATIENT
Start: 2023-12-29 | End: 2024-06-24

## 2023-12-29 NOTE — TELEPHONE ENCOUNTER
levothyroxine (SYNTHROID/LEVOTHROID) 25 MCG tablet 90 tablet 1 6/30/2023     Last Office Visit: 05/23/2023  Future Office visit:    Next 5 appointments (look out 90 days)      Jan 05, 2024  4:00 PM  (Arrive by 3:45 PM)  PHYSICAL with EZEQUIEL Denson  [facility] [place] ([facility] [place] ) [address]  [place] [address]  [phone]     Jan 23, 2024  1:45 PM  (Arrive by 1:30 PM)  Return Visit with Rocio Tuttle PA-C  [facility] [place] ([facility] [place] ) [address]  [place] [address]  [phone]             Routing refill request to provider for review/approval because:

## 2023-12-29 NOTE — TELEPHONE ENCOUNTER
Levothyroxine      Last Written Prescription Date:  6/30/23  Last Fill Quantity: 90,   # refills: 1  Last Office Visit: 5/23//23  Future Office visit:    Next 5 appointments (look out 90 days)      Jan 05, 2024  4:00 PM  (Arrive by 3:45 PM)  PHYSICAL with EZEQUIEL Denson  [facility] [place] ([facility] [place] ) [address]  [place] [address]  [phone]     Jan 23, 2024  1:45 PM  (Arrive by 1:30 PM)  Return Visit with Rocio Tuttle PA-C  [facility] [place] ([facility] [place] ) [address]  [place] [address]  [phone]

## 2024-01-05 ENCOUNTER — OFFICE VISIT (OUTPATIENT)
Dept: FAMILY MEDICINE | Facility: OTHER | Age: 69
End: 2024-01-05
Attending: PHYSICIAN ASSISTANT
Payer: COMMERCIAL

## 2024-01-05 VITALS
TEMPERATURE: 98.6 F | BODY MASS INDEX: 28.6 KG/M2 | HEIGHT: 63 IN | WEIGHT: 161.4 LBS | HEART RATE: 80 BPM | OXYGEN SATURATION: 97 % | SYSTOLIC BLOOD PRESSURE: 128 MMHG | RESPIRATION RATE: 16 BRPM | DIASTOLIC BLOOD PRESSURE: 84 MMHG

## 2024-01-05 DIAGNOSIS — Z23 HIGH PRIORITY FOR 2019-NCOV VACCINE: ICD-10-CM

## 2024-01-05 DIAGNOSIS — E78.5 HYPERLIPIDEMIA LDL GOAL <100: ICD-10-CM

## 2024-01-05 DIAGNOSIS — Z00.00 ENCOUNTER FOR MEDICARE ANNUAL WELLNESS EXAM: Primary | ICD-10-CM

## 2024-01-05 DIAGNOSIS — Z23 NEED FOR PROPHYLACTIC VACCINATION AND INOCULATION AGAINST INFLUENZA: ICD-10-CM

## 2024-01-05 DIAGNOSIS — E03.9 ACQUIRED HYPOTHYROIDISM: ICD-10-CM

## 2024-01-05 DIAGNOSIS — Z23 NEED FOR VACCINATION: ICD-10-CM

## 2024-01-05 PROCEDURE — 90662 IIV NO PRSV INCREASED AG IM: CPT

## 2024-01-05 PROCEDURE — 90480 ADMN SARSCOV2 VAC 1/ONLY CMP: CPT

## 2024-01-05 PROCEDURE — G0439 PPPS, SUBSEQ VISIT: HCPCS | Performed by: PHYSICIAN ASSISTANT

## 2024-01-05 PROCEDURE — G0009 ADMIN PNEUMOCOCCAL VACCINE: HCPCS

## 2024-01-05 RX ORDER — AMITRIPTYLINE HYDROCHLORIDE 50 MG/1
1 TABLET ORAL
COMMUNITY
Start: 2023-11-27

## 2024-01-05 ASSESSMENT — ENCOUNTER SYMPTOMS
WEAKNESS: 0
CONSTIPATION: 0
ARTHRALGIAS: 0
COUGH: 0
HEADACHES: 0
SHORTNESS OF BREATH: 0
NAUSEA: 0
FEVER: 0
BREAST MASS: 0
MYALGIAS: 0
PARESTHESIAS: 0
SORE THROAT: 0
EYE PAIN: 0
HEMATOCHEZIA: 0
PALPITATIONS: 0
CHILLS: 0
DYSURIA: 0
HEMATURIA: 0
NERVOUS/ANXIOUS: 0
JOINT SWELLING: 0
DIZZINESS: 0
ABDOMINAL PAIN: 0
HEARTBURN: 0
DIARRHEA: 0
FREQUENCY: 0

## 2024-01-05 ASSESSMENT — PAIN SCALES - GENERAL: PAINLEVEL: NO PAIN (0)

## 2024-01-05 ASSESSMENT — ACTIVITIES OF DAILY LIVING (ADL): CURRENT_FUNCTION: NO ASSISTANCE NEEDED

## 2024-01-05 NOTE — PATIENT INSTRUCTIONS
Patient Education   Personalized Prevention Plan  You are due for the preventive services outlined below.  Your care team is available to assist you in scheduling these services.  If you have already completed any of these items, please share that information with your care team to update in your medical record.  Health Maintenance Due   Topic Date Due     Polio Vaccine (3 of 3 - 4-dose series) 03/13/1959     RSV VACCINE (Pregnancy & 60+) (1 - 1-dose 60+ series) Never done     Flu Vaccine (1) 09/01/2023     COVID-19 Vaccine (6 - 2023-24 season) 09/01/2023     Osteoporosis Screening  09/22/2023     FALL RISK ASSESSMENT  12/08/2023     Cholesterol Lab  12/09/2023     PHQ-2 (once per calendar year)  01/01/2024     Annual Wellness Visit  12/08/2023

## 2024-01-05 NOTE — PROGRESS NOTES
"SUBJECTIVE:   Laney is a 68 year old, presenting for the following:  Physical        1/5/2024     3:46 PM   Additional Questions   Roomed by Nidia Castrejon   Accompanied by self         1/5/2024     3:46 PM   Patient Reported Additional Medications   Patient reports taking the following new medications none       Are you in the first 12 months of your Medicare coverage?  No    Healthy Habits:     In general, how would you rate your overall health?  Good    Frequency of exercise:  4-5 days/week    Duration of exercise:  Greater than 60 minutes    Do you usually eat at least 4 servings of fruit and vegetables a day, include whole grains    & fiber and avoid regularly eating high fat or \"junk\" foods?  Yes    Taking medications regularly:  Yes    Medication side effects:  None    Ability to successfully perform activities of daily living:  No assistance needed    Home Safety:  No safety concerns identified    Hearing Impairment:  No hearing concerns    In the past 6 months, have you been bothered by leaking of urine?  No    In general, how would you rate your overall mental or emotional health?  Good    Additional concerns today:  No      Have you ever done Advance Care Planning? (For example, a Health Directive, POLST, or a discussion with a medical provider or your loved ones about your wishes): No, advance care planning information given to patient to review.  Patient plans to discuss their wishes with loved ones or provider.         Fall risk  Fallen 2 or more times in the past year?: No  Any fall with injury in the past year?: No    Cognitive Screening   1) Repeat 3 items (Leader, Season, Table)    2) Clock draw: NORMAL  3) 3 item recall: Recalls 3 objects  Results: 3 items recalled: COGNITIVE IMPAIRMENT LESS LIKELY    Mini-CogTM Copyright AGUILAR Ochoa. Licensed by the author for use in [facility]; reprinted with permission (alexandra@.[email or web address]). All rights reserved.      Do you have sleep apnea, excessive snoring " or daytime drowsiness? : no    Reviewed and updated as needed this visit by clinical staff   Tobacco  Allergies  Meds              Reviewed and updated as needed this visit by Provider                 Social History     Tobacco Use    Smoking status: Never    Smokeless tobacco: Never    Tobacco comments:     no passive exposure   Substance Use Topics    Alcohol use: Yes     Comment: socially             1/5/2024     3:35 PM   Alcohol Use   Prescreen: >3 drinks/day or >7 drinks/week? No     Do you have a current opioid prescription? No  Do you use any other controlled substances or medications that are not prescribed by a provider? Alcohol-very seldom              Current providers sharing in care for this patient include:   Patient Care Team:  Lazara Tate PA as PCP - General  Lazara Tate PA as Assigned PCP  Rocio Tuttle PA-C as Assigned Surgical Provider    The following health maintenance items are reviewed in Epic and correct as of today:  Health Maintenance   Topic Date Due    IPV IMMUNIZATION (3 of 3 - 4-dose series) 03/13/1959    RSV VACCINE (Pregnancy & 60+) (1 - 1-dose 60+ series) Never done    INFLUENZA VACCINE (1) 09/01/2023    COVID-19 Vaccine (6 - 2023-24 season) 09/01/2023    DEXA  09/22/2023    LIPID  12/09/2023    MEDICARE ANNUAL WELLNESS VISIT  12/08/2023    TSH W/FREE T4 REFLEX  03/31/2024    FALL RISK ASSESSMENT  01/05/2025    MAMMO SCREENING  10/10/2025    COLORECTAL CANCER SCREENING  05/15/2027    ADVANCE CARE PLANNING  12/13/2027    DTAP/TDAP/TD IMMUNIZATION (11 - Td or Tdap) 09/15/2031    HEPATITIS C SCREENING  Completed    PHQ-2 (once per calendar year)  Completed    Pneumococcal Vaccine: 65+ Years  Completed    ZOSTER IMMUNIZATION  Completed    HPV IMMUNIZATION  Aged Out    MENINGITIS IMMUNIZATION  Aged Out    RSV MONOCLONAL ANTIBODY  Aged Out    PAP  Discontinued     BP Readings from Last 3 Encounters:   01/05/24 128/84   08/13/23 120/88   07/19/23 (!) 142/82    Wt Readings  from Last 3 Encounters:   01/05/24 73.2 kg (161 lb 6.4 oz)   08/13/23 72.1 kg (158 lb 14.4 oz)   07/19/23 72.6 kg (160 lb)                  Patient Active Problem List   Diagnosis    ACP (advance care planning)    Other specified glaucoma    Irritable bowel syndrome with diarrhea    Atrophic vaginitis     Past Surgical History:   Procedure Laterality Date    COLONOSCOPY  3-    COLONOSCOPY N/A 5/15/2017    Procedure: COLONOSCOPY;  COLONOSCOPY WITH BIOPSIES;  Surgeon: David Vieira MD;  Location: [facility] OR    excision of ganglion cyst      phlebectomies x11 R leg,x12 L leg      Bilateral, varicose veins       Social History     Tobacco Use    Smoking status: Never    Smokeless tobacco: Never    Tobacco comments:     no passive exposure   Substance Use Topics    Alcohol use: Yes     Comment: socially     Family History   Problem Relation Age of Onset    Cancer Father     Other - See Comments Father 82        Emphysema, cause of death    Arthritis Mother 62        Rheumatoid Arthritis, cause of death    Other - See Comments Sister         Stomach aneurysm         Current Outpatient Medications   Medication Sig Dispense Refill    amitriptyline (ELAVIL) 50 MG tablet Take 1 tablet by mouth daily at 2 pm      budesonide (PULMICORT) 0.5 MG/2ML neb solution MAKE 240 ML LUX MED SINUS IRRIGATION MIX 2 ML VIAL OF BUDESONIDE 0.5MG AND RINSE TWICE DAILY 120 mL 4    estradiol (VAGIFEM) 10 MCG TABS vaginal tablet Place 1 tablet (10 mcg) vaginally twice a week 24 tablet 3    fluticasone (FLONASE) 50 MCG/ACT nasal spray Spray 2 sprays into both nostrils daily 16 g 11    levothyroxine (SYNTHROID/LEVOTHROID) 25 MCG tablet TAKE 1 TABLET (25 MCG) BY MOUTH DAILY 90 tablet 1    travoprost MICHELLE FREE (TRAVATAN Z) 0.004 % ophthalmic solution Apply 1 drop to eye daily      CRANBERRY Take 1 by mouth daily (Patient not taking: Reported on 1/5/2024)      fish oil-omega-3 fatty acids 1000 MG capsule Take 1 by oral route every day (Patient  not taking: Reported on 1/5/2024)      L-FORMULA LYSINE HCL PO Take 1 tablet by mouth daily (Patient not taking: Reported on 1/5/2024)      Multiple Vitamin (DAILY MULTIVITAMIN PO) Take 1 tablet by oral route every day with food (Patient not taking: Reported on 1/5/2024)      UNKNOWN TO PATIENT Eye drops for Glaucoma. Name unknown. 1 drop to each eye daily (Patient not taking: Reported on 1/5/2024)       Allergies   Allergen Reactions    Amoxicillin-Pot Clavulanate Cramps     Recent Labs   Lab Test 03/31/23  1151 12/09/22  0925 09/17/21  0840 04/06/21  1551 08/20/20  0800 01/13/20  1457 03/28/19  0839   LDL  --  102* 133*  --  140*  --  127*   HDL  --  76 76  --  70  --  81   TRIG  --  315* 132  --  183*  --  150*   ALT  --  27 27  --  30  --  30   CR  --  0.71 0.73  --  0.70  --  0.69   GFRESTIMATED  --  >90 87  --  >90  --  >90   GFRESTBLACK  --   --   --   --  >90  --  >90   POTASSIUM  --  4.2 3.8  --  4.1  --  4.3   TSH 3.40 5.17*  --    < >  --    < >  --     < > = values in this interval not displayed.          Mammogram Screening: Mammogram Screening: Recommended mammography every 1-2 years with patient discussion and risk factor consideration  Last 3 Pap and HPV Results:       Latest Ref Rng & Units 3/12/2018     1:38 PM 11/25/2014    12:00 AM   PAP / HPV   PAP (Historical)  NIL  NIL    HPV 16 DNA NEG^Negative Negative     HPV 18 DNA NEG^Negative Negative     Other HR HPV NEG^Negative Negative             12/8/2022     1:57 PM   Breast CA Risk Assessment (FHS-7)   Do you have a family history of breast, colon, or ovarian cancer? No / Unknown         Mammogram Screening: Recommended mammography every 1-2 years with patient discussion and risk factor consideration  Pertinent mammograms are reviewed under the imaging tab.    Review of Systems   Constitutional:  Negative for chills and fever.   HENT:  Negative for congestion, ear pain, hearing loss and sore throat.    Eyes:  Negative for pain and visual  "disturbance.   Respiratory:  Negative for cough and shortness of breath.    Cardiovascular:  Negative for chest pain, palpitations and peripheral edema.   Gastrointestinal:  Negative for abdominal pain, constipation, diarrhea, heartburn, hematochezia and nausea.   Breasts:  Negative for tenderness, breast mass and discharge.   Genitourinary:  Negative for dysuria, frequency, genital sores, hematuria, pelvic pain, urgency, vaginal bleeding and vaginal discharge.   Musculoskeletal:  Negative for arthralgias, joint swelling and myalgias.   Skin:  Negative for rash.   Neurological:  Negative for dizziness, weakness, headaches and paresthesias.   Psychiatric/Behavioral:  Negative for mood changes. The patient is not nervous/anxious.          OBJECTIVE:   /84   Pulse 80   Temp 98.6  F (37  C) (Tympanic)   Resp 16   Ht 1.6 m (5' 3\")   Wt 73.2 kg (161 lb 6.4 oz)   SpO2 97%   BMI 28.59 kg/m   Estimated body mass index is 28.59 kg/m  as calculated from the following:    Height as of this encounter: 1.6 m (5' 3\").    Weight as of this encounter: 73.2 kg (161 lb 6.4 oz).  Physical Exam  GENERAL APPEARANCE: healthy, alert and no distress  EYES: Eyes grossly normal to inspection, PERRL and conjunctivae and sclerae normal  HENT: ear canals and TM's normal, nose and mouth without ulcers or lesions, oropharynx clear and oral mucous membranes moist  NECK: no adenopathy, no asymmetry, masses, or scars and thyroid normal to palpation  RESP: lungs clear to auscultation - no rales, rhonchi or wheezes  BREAST: normal without masses, tenderness or nipple discharge and no palpable axillary masses or adenopathy  CV: regular rate and rhythm, normal S1 S2, no S3 or S4, no murmur, click or rub, no peripheral edema and peripheral pulses strong  ABDOMEN: soft, nontender, no hepatosplenomegaly, no masses and bowel sounds normal  MS: no musculoskeletal defects are noted and gait is age appropriate without ataxia  SKIN: no suspicious " "lesions or rashes  NEURO: Normal strength and tone, sensory exam grossly normal, mentation intact and speech normal  PSYCH: mentation appears normal and affect normal/bright    Diagnostic Test Results:  Labs reviewed in Epic  No results found for this or any previous visit (from the past 24 hour(s)).    ASSESSMENT / PLAN:       ICD-10-CM    1. Encounter for Medicare annual wellness exam  Z00.00       2. Hyperlipidemia LDL goal <100  E78.5 CBC with platelets and differential     Lipid Profile (Chol, Trig, HDL, LDL calc)     Comprehensive metabolic panel (BMP + Alb, Alk Phos, ALT, AST, Total. Bili, TP)      3. Acquired hypothyroidism  E03.9 TSH with free T4 reflex     CBC with platelets and differential      4. Need for vaccination  Z23       5. Need for prophylactic vaccination and inoculation against influenza  Z23       6. High priority for 2019-nCoV vaccine  Z23           Patient has been advised of split billing requirements and indicates understanding: No      COUNSELING:  Reviewed preventive health counseling, as reflected in patient instructions       Regular exercise       Healthy diet/nutrition       Vision screening       Hearing screening       Bladder control       Fall risk prevention       Immunizations  Vaccinated for: Covid-19, Influenza, and Pneumococcal           Advanced Planning       BMI:   Estimated body mass index is 28.59 kg/m  as calculated from the following:    Height as of this encounter: 1.6 m (5' 3\").    Weight as of this encounter: 73.2 kg (161 lb 6.4 oz).         She reports that she has never smoked. She has never used smokeless tobacco.      Appropriate preventive services were discussed with this patient, including applicable screening as appropriate for fall prevention, nutrition, physical activity, Tobacco-use cessation, weight loss and cognition.  Checklist reviewing preventive services available has been given to the patient.    Reviewed patients plan of care and provided an " AVS. The Basic Care Plan (routine screening as documented in Health Maintenance) for Laney meets the Care Plan requirement. This Care Plan has been established and reviewed with the Patient.          EZEQUIEL Luther  [facility] - [place]ESS[place]    Identified Health Risks:  I have reviewed Opioid Use Disorder and Substance Use Disorder risk factors and made any needed referrals.

## 2024-01-08 ENCOUNTER — LAB (OUTPATIENT)
Dept: LAB | Facility: OTHER | Age: 69
End: 2024-01-08
Payer: COMMERCIAL

## 2024-01-08 DIAGNOSIS — E78.5 HYPERLIPIDEMIA LDL GOAL <100: ICD-10-CM

## 2024-01-08 DIAGNOSIS — E03.9 ACQUIRED HYPOTHYROIDISM: ICD-10-CM

## 2024-01-08 LAB
ALBUMIN SERPL BCG-MCNC: 4.4 G/DL (ref 3.5–5.2)
ALP SERPL-CCNC: 75 U/L (ref 40–150)
ALT SERPL W P-5'-P-CCNC: 24 U/L (ref 0–50)
ANION GAP SERPL CALCULATED.3IONS-SCNC: 10 MMOL/L (ref 7–15)
AST SERPL W P-5'-P-CCNC: 23 U/L (ref 0–45)
BASOPHILS # BLD AUTO: 0.1 10E3/UL (ref 0–0.2)
BASOPHILS NFR BLD AUTO: 1 %
BILIRUB SERPL-MCNC: 0.6 MG/DL
BUN SERPL-MCNC: 12.8 MG/DL (ref 8–23)
CALCIUM SERPL-MCNC: 9.3 MG/DL (ref 8.8–10.2)
CHLORIDE SERPL-SCNC: 102 MMOL/L (ref 98–107)
CHOLEST SERPL-MCNC: 264 MG/DL
CREAT SERPL-MCNC: 0.65 MG/DL (ref 0.51–0.95)
DEPRECATED HCO3 PLAS-SCNC: 26 MMOL/L (ref 22–29)
EGFRCR SERPLBLD CKD-EPI 2021: >90 ML/MIN/1.73M2
EOSINOPHIL # BLD AUTO: 0.2 10E3/UL (ref 0–0.7)
EOSINOPHIL NFR BLD AUTO: 2 %
ERYTHROCYTE [DISTWIDTH] IN BLOOD BY AUTOMATED COUNT: 13.2 % (ref 10–15)
FASTING STATUS PATIENT QL REPORTED: YES
GLUCOSE SERPL-MCNC: 78 MG/DL (ref 70–99)
HCT VFR BLD AUTO: 40 % (ref 35–47)
HDLC SERPL-MCNC: 87 MG/DL
HGB BLD-MCNC: 13.3 G/DL (ref 11.7–15.7)
IMM GRANULOCYTES # BLD: 0 10E3/UL
IMM GRANULOCYTES NFR BLD: 0 %
LDLC SERPL CALC-MCNC: 153 MG/DL
LYMPHOCYTES # BLD AUTO: 2.9 10E3/UL (ref 0.8–5.3)
LYMPHOCYTES NFR BLD AUTO: 34 %
MCH RBC QN AUTO: 30.8 PG (ref 26.5–33)
MCHC RBC AUTO-ENTMCNC: 33.3 G/DL (ref 31.5–36.5)
MCV RBC AUTO: 93 FL (ref 78–100)
MONOCYTES # BLD AUTO: 0.6 10E3/UL (ref 0–1.3)
MONOCYTES NFR BLD AUTO: 7 %
NEUTROPHILS # BLD AUTO: 4.9 10E3/UL (ref 1.6–8.3)
NEUTROPHILS NFR BLD AUTO: 56 %
NONHDLC SERPL-MCNC: 177 MG/DL
NRBC # BLD AUTO: 0 10E3/UL
NRBC BLD AUTO-RTO: 0 /100
PLATELET # BLD AUTO: 257 10E3/UL (ref 150–450)
POTASSIUM SERPL-SCNC: 3.6 MMOL/L (ref 3.4–5.3)
PROT SERPL-MCNC: 7.3 G/DL (ref 6.4–8.3)
RBC # BLD AUTO: 4.32 10E6/UL (ref 3.8–5.2)
SODIUM SERPL-SCNC: 138 MMOL/L (ref 135–145)
TRIGL SERPL-MCNC: 120 MG/DL
TSH SERPL DL<=0.005 MIU/L-ACNC: 2.81 UIU/ML (ref 0.3–4.2)
WBC # BLD AUTO: 8.7 10E3/UL (ref 4–11)

## 2024-01-08 PROCEDURE — 85025 COMPLETE CBC W/AUTO DIFF WBC: CPT | Mod: ZL

## 2024-01-08 PROCEDURE — 82465 ASSAY BLD/SERUM CHOLESTEROL: CPT | Mod: ZL

## 2024-01-08 PROCEDURE — 36415 COLL VENOUS BLD VENIPUNCTURE: CPT | Mod: ZL

## 2024-01-08 PROCEDURE — 84443 ASSAY THYROID STIM HORMONE: CPT | Mod: ZL

## 2024-01-08 PROCEDURE — 80053 COMPREHEN METABOLIC PANEL: CPT | Mod: ZL

## 2024-01-08 NOTE — RESULT ENCOUNTER NOTE
So far very happy with your labs. Awaiting TSH.  The LDL is too high but you brought the HDL up 10 points as well. Risk score went up only slightly. Would encourage.

## 2024-01-09 ENCOUNTER — OFFICE VISIT (OUTPATIENT)
Dept: FAMILY MEDICINE | Facility: OTHER | Age: 69
End: 2024-01-09
Attending: NURSE PRACTITIONER
Payer: COMMERCIAL

## 2024-01-09 VITALS
SYSTOLIC BLOOD PRESSURE: 136 MMHG | WEIGHT: 156 LBS | BODY MASS INDEX: 27.63 KG/M2 | OXYGEN SATURATION: 96 % | DIASTOLIC BLOOD PRESSURE: 80 MMHG | HEART RATE: 88 BPM | TEMPERATURE: 97.3 F

## 2024-01-09 DIAGNOSIS — J01.90 ACUTE SINUSITIS, RECURRENCE NOT SPECIFIED, UNSPECIFIED LOCATION: ICD-10-CM

## 2024-01-09 DIAGNOSIS — H10.33 ACUTE BACTERIAL CONJUNCTIVITIS OF BOTH EYES: Primary | ICD-10-CM

## 2024-01-09 PROCEDURE — G0463 HOSPITAL OUTPT CLINIC VISIT: HCPCS

## 2024-01-09 PROCEDURE — 99213 OFFICE O/P EST LOW 20 MIN: CPT | Performed by: NURSE PRACTITIONER

## 2024-01-09 RX ORDER — FLUTICASONE PROPIONATE 50 MCG
1 SPRAY, SUSPENSION (ML) NASAL DAILY
Qty: 16 G | Refills: 3 | Status: SHIPPED | OUTPATIENT
Start: 2024-01-09 | End: 2024-05-11

## 2024-01-09 RX ORDER — POLYMYXIN B SULFATE AND TRIMETHOPRIM 1; 10000 MG/ML; [USP'U]/ML
1-2 SOLUTION OPHTHALMIC EVERY 4 HOURS
Qty: 10 ML | Refills: 0 | Status: SHIPPED | OUTPATIENT
Start: 2024-01-09 | End: 2024-05-11

## 2024-01-09 NOTE — PATIENT INSTRUCTIONS
Pinkeye: Care Instructions  Overview     Pinkeye is redness and swelling of the eye surface and the conjunctiva (the lining of the eyelid and the covering of the white part of the eye). Pinkeye is also called conjunctivitis. Pinkeye is often caused by infection with bacteria or a virus. Dry air, allergies, smoke, and chemicals are other common causes.  Pinkeye often gets better on its own in 7 to 10 days. Antibiotics only help if the pinkeye is caused by bacteria. Pinkeye caused by infection spreads easily. If an allergy or chemical is causing pinkeye, it will not go away unless you can avoid whatever is causing it.  Follow-up care is a key part of your treatment and safety. Be sure to make and go to all appointments, and call your doctor if you are having problems. It's also a good idea to know your test results and keep a list of the medicines you take.  How can you care for yourself at home?  Wash your hands often. Always wash them before and after you treat pinkeye or touch your eyes or face.  Use moist cotton or a clean, wet cloth to remove crust. Wipe from the inside corner of the eye to the outside. Use a clean part of the cloth for each wipe.  Put cold or warm wet cloths on your eye a few times a day if the eye hurts.  Do not wear contact lenses or eye makeup until the pinkeye is gone. Throw away any eye makeup you were using when you got pinkeye. Clean your contacts and storage case. If you wear disposable contacts, use a new pair when your eye has cleared and it is safe to wear contacts again.  If the doctor gave you antibiotic ointment or eyedrops, use them as directed. Use the medicine for as long as instructed, even if your eye starts looking better soon. Keep the bottle tip clean, and do not let it touch the eye area.  To put in eyedrops or ointment:  Tilt your head back, and pull your lower eyelid down with one finger.  Drop or squirt the medicine inside the lower lid.  Close your eye for 30 to 60  "seconds to let the drops or ointment move around.  Do not touch the ointment or dropper tip to your eyelashes or any other surface.  Do not share towels, pillows, or washcloths while you have pinkeye.  When should you call for help?   Call your doctor now or seek immediate medical care if:    You have pain in your eye, not just irritation on the surface.     You have a change in vision or loss of vision.     You have an increase in discharge from the eye.     Your eye has not started to improve or begins to get worse within 48 hours after you start using antibiotics.     Pinkeye lasts longer than 7 days.   Watch closely for changes in your health, and be sure to contact your doctor if you have any problems.  Where can you learn more?  Go to [email or web address]://[email or web address].net/patiented  Enter Y392 in the search box to learn more about \"Pinkeye: Care Instructions.\"  Current as of: June 6, 2023               Content Version: 13.8    5411-3778 [employer].   Care instructions adapted under license by your healthcare professional. If you have questions about a medical condition or this instruction, always ask your healthcare professional. [employer] disclaims any warranty or liability for your use of this information.      "

## 2024-01-09 NOTE — PROGRESS NOTES
Assessment & Plan     Acute bacterial conjunctivitis of both eyes  Drainage in both eyes, possible from sinus pressure  Viral vs bacterial conjunctivitis - will treat with antibiotic drops to help eyes lubicate and decrease symptoms.  If she has any change in symptoms, pain or any changes she should follow up with ophthalmology   - polymixin b-trimethoprim (POLYTRIM) 99154-4.1 UNIT/ML-% ophthalmic solution; Place 1-2 drops into both eyes every 4 hours    Acute sinusitis, recurrence not specified, unspecified location  Laney states sinus symptoms started 3 days ago and no illness prior to that.    Discussed trying self sinus care with sinus rinsing followed by nasal steroid.  If symptoms continue or worsen she should follow up   No antibiotic at this time with her symptoms just starting 3 days ago probably viral or allergie related.  If no improvement or worsening symptoms consider antibiotic  - fluticasone (FLONASE) 50 MCG/ACT nasal spray; Spray 1 spray into both nostrils daily             See Patient Instructions    No follow-ups on file.    KEO Treadwell [facility] - [place]ESS[place] Davison is a 68 year old, presenting for the following health issues:  Conjunctivitis        1/9/2024     2:30 PM   Additional Questions   Roomed by Bryan Steiner CMA   Accompanied by Self         1/9/2024     2:30 PM   Patient Reported Additional Medications   Patient reports taking the following new medications None       HPI     Eye(s) Problem  Onset/Duration: started last night  Description:   Location: Bilateral  Pain: YES  Redness: YES  Accompanying Signs & Symptoms:  Discharge/mattering: YES  Swelling: YES  Visual changes: No  Fever: No  Nasal Congestion: YES  Bothered by bright lights: YES- does have glaucoma  History:  Trauma: No  Foreign body exposure: No  Wearing contacts: No  Precipitating or alleviating factors: None  Therapies tried and outcome: None  Feels like she might have  some sinus swelling for a couple of day and now has drainage coming out of both eyes         Review of Systems   CONSTITUTIONAL:NEGATIVE for fever, chills, change in weight  EYES: bilateral green drainage from both eyes   ENT/MOUTH: sinus pressure   RESP:NEGATIVE for significant cough or SOB  CV: NEGATIVE for chest pain, palpitations or peripheral edema      Objective    /80   Pulse 88   Temp 97.3  F (36.3  C) (Tympanic)   Wt 70.8 kg (156 lb)   SpO2 96%   BMI 27.63 kg/m    Body mass index is 27.63 kg/m .  Physical Exam   GENERAL: alert and no distress  EYES: green drainage from both eyes and erythema bilaterally   HENT: normal cephalic/atraumatic, ear canals and TM's normal, nose and mouth without ulcers or lesions, oropharynx clear, oral mucous membranes moist, and sinuses: maxillary tenderness on both sides  RESP: lungs clear to auscultation - no rales, rhonchi or wheezes  CV: regular rate and rhythm, normal S1 S2, no S3 or S4, no murmur, click or rub, no peripheral edema and peripheral pulses strong  ABDOMEN: soft, nontender, no hepatosplenomegaly, no masses and bowel sounds normal  MS: no gross musculoskeletal defects noted, no edema

## 2024-01-23 ENCOUNTER — OFFICE VISIT (OUTPATIENT)
Dept: OTOLARYNGOLOGY | Facility: OTHER | Age: 69
End: 2024-01-23
Attending: PHYSICIAN ASSISTANT
Payer: COMMERCIAL

## 2024-01-23 VITALS
OXYGEN SATURATION: 99 % | SYSTOLIC BLOOD PRESSURE: 112 MMHG | HEIGHT: 63 IN | WEIGHT: 156.09 LBS | BODY MASS INDEX: 27.66 KG/M2 | DIASTOLIC BLOOD PRESSURE: 78 MMHG | TEMPERATURE: 98.3 F | HEART RATE: 90 BPM

## 2024-01-23 DIAGNOSIS — H72.91 PERFORATION OF TYMPANIC MEMBRANE, RIGHT: ICD-10-CM

## 2024-01-23 DIAGNOSIS — H92.11 OTORRHEA, RIGHT: Primary | ICD-10-CM

## 2024-01-23 DIAGNOSIS — H90.A31 MIXED CONDUCTIVE AND SENSORINEURAL HEARING LOSS OF RIGHT EAR WITH RESTRICTED HEARING OF LEFT EAR: ICD-10-CM

## 2024-01-23 DIAGNOSIS — H90.A22 SENSORINEURAL HEARING LOSS (SNHL) OF LEFT EAR WITH RESTRICTED HEARING OF RIGHT EAR: ICD-10-CM

## 2024-01-23 PROCEDURE — 92504 EAR MICROSCOPY EXAMINATION: CPT | Performed by: PHYSICIAN ASSISTANT

## 2024-01-23 PROCEDURE — G0463 HOSPITAL OUTPT CLINIC VISIT: HCPCS

## 2024-01-23 PROCEDURE — 99213 OFFICE O/P EST LOW 20 MIN: CPT | Mod: 25 | Performed by: PHYSICIAN ASSISTANT

## 2024-01-23 RX ORDER — CIPROFLOXACIN AND DEXAMETHASONE 3; 1 MG/ML; MG/ML
4 SUSPENSION/ DROPS AURICULAR (OTIC) 2 TIMES DAILY
Qty: 7.5 ML | Refills: 0 | Status: SHIPPED | OUTPATIENT
Start: 2024-01-23 | End: 2024-01-30

## 2024-01-23 ASSESSMENT — PAIN SCALES - GENERAL: PAINLEVEL: NO PAIN (0)

## 2024-01-23 NOTE — PATIENT INSTRUCTIONS
Start Ciprodex 4 drops twice a day for 7 days to right ear  Small perforation present.   Follow up in 3-4 weeks for ear check  Complete audiogram in future.       Thank you for allowing Rocio Tuttle PA-C and our ENT team to participate in your care.  If your medications are too expensive, please give the nurse a call.  We can possibly change this medication.  If you have a scheduling or an appointment question please contact our Health Unit Coordinator at [phone], Ext. [phone].    ALL nursing questions or concerns can be directed to your ENT nurse at: [phone] Marleen

## 2024-01-23 NOTE — PROGRESS NOTES
Chief Complaint   Patient presents with    Ear Problem     Ear re-check       Today she reports that the right ear feels numb, dull inside her ear canal. Hearing has been stable and does not feel like it has been worsening. She has felt increase in plugged feeling but comes and goes.   No recent ear otorrhea.   Denies facial numbness or weakness.   She has bilateral tinnitus.   She has continued tinnitus in left ear.   No recent otalgia.   She has continued stuffiness in her right ear intermittently.         She has felt her sinuses have increased in discomfort.No facial pain, pressure.   She has used Flonase intermittently with some relief.   She has been using Budesonide rinses   No worrisome post nasal drainage.         Audiogram completed 11/8/2022:  Tympanograms are Type A for left ear suggesting normal eardrum mobility and Type B with large volume right ear-repeatable.  Acoustic Reflex Thresholds at 1000 Hz are present only ipsilateral right.  Thresholds are normal sloping to mild sensorineural left and normal right sloping to severe mixed loss.  Speech reception thresholds are in good agreement with pure tone average.  Word discrimination scores are excellent at supra-thresholds level.        Audiogram-2/28/2023  Right ear only  Tympanograms  Type B large volume, perforation right.  Thresholds are normal to severe mixed loss, right.  SRT=PTA  WRS-  Right- 100%@70dB    Past Medical History:   Diagnosis Date    Glaucoma     Menopausal or female climacteric states 08/21/2000    Routine general medical examination at [facility] 08/18/2000        Allergies   Allergen Reactions    Amoxicillin-Pot Clavulanate Cramps     Current Outpatient Medications   Medication    amitriptyline (ELAVIL) 50 MG tablet    budesonide (PULMICORT) 0.5 MG/2ML neb solution    CRANBERRY    estradiol (VAGIFEM) 10 MCG TABS vaginal tablet    fish oil-omega-3 fatty acids 1000 MG capsule    fluticasone (FLONASE) 50 MCG/ACT nasal spray     "fluticasone (FLONASE) 50 MCG/ACT nasal spray    L-FORMULA LYSINE HCL PO    levothyroxine (SYNTHROID/LEVOTHROID) 25 MCG tablet    Multiple Vitamin (DAILY MULTIVITAMIN PO)    polymixin b-trimethoprim (POLYTRIM) 50422-9.1 UNIT/ML-% ophthalmic solution    travoprost MICHELLE FREE (TRAVATAN Z) 0.004 % ophthalmic solution    UNKNOWN TO PATIENT     No current facility-administered medications for this visit.     ROS- SEE HPI  /78 (BP Location: Right arm, Patient Position: Sitting, Cuff Size: Adult Regular)   Pulse 90   Temp 98.3  F (36.8  C) (Temporal)   Ht 1.6 m (5' 2.99\")   Wt 70.8 kg (156 lb 1.4 oz)   SpO2 99%   BMI 27.66 kg/m      General - The patient is well nourished and well developed, and appears to have good nutritional status.  Alert and oriented to person and place, answers questions and cooperates with examination appropriately.   Head and Face - Normocephalic and atraumatic, with no gross asymmetry noted.  The facial nerve is intact, with strong symmetric movements.  Voice and Breathing - The patient was breathing comfortably without the use of accessory muscles. There was no wheezing, stridor. The patients voice was clear and strong, and had appropriate pitch and quality.  Ears - External ear normal. The ears were examined under binocular microscopy and with ototsocpe.  Right TM has monomeric inferior area with posterior inferior microperforation.   Posterior superior with thick tympanosclerosis.  Right EAC with dried debris present, removed with cupped forceps and #3 Lucas. Mild granulation tissue appears present along floor of EAC.  Left tympanic membrane appears intact without effusion or retraction.  Eyes - Extraocular movements intact, sclera were not icteric or injected.  Mouth - Examination of the oral cavity showed pink, healthy oral mucosa. Dentition in good condition. No lesions or ulcerations noted. The tongue was mobile and midline.   Throat - The walls of the oropharynx were smooth, " pink, moist, symmetric, and had no lesions or ulcerations.  The tonsillar pillars and soft palate were symmetric. The uvula was midline on elevation.    Neck - Normal midline excursion of the laryngotracheal complex during swallowing.  Full range of motion on passive movement.  Palpation of the occipital, submental, submandibular, internal jugular chain, and supraclavicular nodes did not demonstrate any abnormal lymph nodes or masses.  Palpation of the thyroid was soft and smooth, with no nodules or goiter appreciated.  The trachea was mobile and midline.  Nose - External contour is symmetric, no gross deflection or scars.  Nasal mucosa is pink and moist with no abnormal mucus.  The septum and turbinates were evaluated with nasal speculum, the inferior turbinates continue to be enlarged, no polyps, masses, or purulence noted on examination.        ASSESSMENT/ PLAN:       ICD-10-CM    1. Otorrhea, right  H92.11 ciprofloxacin-dexAMETHasone (CIPRODEX) 0.3-0.1 % otic suspension      2. Perforation of tympanic membrane, right  H72.91       3. Mixed conductive and sensorineural hearing loss of right ear with restricted hearing of left ear  H90.A31       4. Sensorineural hearing loss (SNHL) of left ear with restricted hearing of right ear  H90.A22             Start Ciprodex 4 drops twice a day for 7 days to right ear  Water precautions until next visit  Perforation remains present.   Monitor right EAC may require cauterization.     Follow up in 3-4 weeks for ear check  Complete audiogram in future.       Rocio Tuttle PA-C  ENT  [facility]

## 2024-01-23 NOTE — LETTER
1/23/2024         RE: Laney Guzman  [address]  [facility] [place] [address]        Dear Colleague,    Thank you for referring your patient, Laney Guzman, to the [facility] [place]. Please see a copy of my visit note below.    Chief Complaint   Patient presents with     Ear Problem     Ear re-check       Today she reports that the right ear feels numb, dull inside her ear canal. Hearing has been stable and does not feel like it has been worsening. She has felt increase in plugged feeling but comes and goes.   No recent ear otorrhea.   Denies facial numbness or weakness.   She has bilateral tinnitus.   She has continued tinnitus in left ear.   No recent otalgia.   She has continued stuffiness in her right ear intermittently.         She has felt her sinuses have increased in discomfort.No facial pain, pressure.   She has used Flonase intermittently with some relief.   She has been using Budesonide rinses   No worrisome post nasal drainage.         Audiogram completed 11/8/2022:  Tympanograms are Type A for left ear suggesting normal eardrum mobility and Type B with large volume right ear-repeatable.  Acoustic Reflex Thresholds at 1000 Hz are present only ipsilateral right.  Thresholds are normal sloping to mild sensorineural left and normal right sloping to severe mixed loss.  Speech reception thresholds are in good agreement with pure tone average.  Word discrimination scores are excellent at supra-thresholds level.        Audiogram-2/28/2023  Right ear only  Tympanograms  Type B large volume, perforation right.  Thresholds are normal to severe mixed loss, right.  SRT=PTA  WRS-  Right- 100%@70dB    Past Medical History:   Diagnosis Date     Glaucoma      Menopausal or female climacteric states 08/21/2000     Routine general medical examination at [facility] 08/18/2000        Allergies   Allergen Reactions     Amoxicillin-Pot Clavulanate Cramps     Current Outpatient Medications  "  Medication     amitriptyline (ELAVIL) 50 MG tablet     budesonide (PULMICORT) 0.5 MG/2ML neb solution     CRANBERRY     estradiol (VAGIFEM) 10 MCG TABS vaginal tablet     fish oil-omega-3 fatty acids 1000 MG capsule     fluticasone (FLONASE) 50 MCG/ACT nasal spray     fluticasone (FLONASE) 50 MCG/ACT nasal spray     L-FORMULA LYSINE HCL PO     levothyroxine (SYNTHROID/LEVOTHROID) 25 MCG tablet     Multiple Vitamin (DAILY MULTIVITAMIN PO)     polymixin b-trimethoprim (POLYTRIM) 15793-4.1 UNIT/ML-% ophthalmic solution     travoprost MICHELLE FREE (TRAVATAN Z) 0.004 % ophthalmic solution     UNKNOWN TO PATIENT     No current facility-administered medications for this visit.     ROS- SEE HPI  /78 (BP Location: Right arm, Patient Position: Sitting, Cuff Size: Adult Regular)   Pulse 90   Temp 98.3  F (36.8  C) (Temporal)   Ht 1.6 m (5' 2.99\")   Wt 70.8 kg (156 lb 1.4 oz)   SpO2 99%   BMI 27.66 kg/m      General - The patient is well nourished and well developed, and appears to have good nutritional status.  Alert and oriented to person and place, answers questions and cooperates with examination appropriately.   Head and Face - Normocephalic and atraumatic, with no gross asymmetry noted.  The facial nerve is intact, with strong symmetric movements.  Voice and Breathing - The patient was breathing comfortably without the use of accessory muscles. There was no wheezing, stridor. The patients voice was clear and strong, and had appropriate pitch and quality.  Ears - External ear normal. The ears were examined under binocular microscopy and with ototsocpe.  Right TM has monomeric inferior area with posterior inferior microperforation.   Posterior superior with thick tympanosclerosis.  Right EAC with dried debris present, removed with cupped forceps and #3 Lucas. Mild granulation tissue appears present along floor of EAC.  Left tympanic membrane appears intact without effusion or retraction.  Eyes - Extraocular " movements intact, sclera were not icteric or injected.  Mouth - Examination of the oral cavity showed pink, healthy oral mucosa. Dentition in good condition. No lesions or ulcerations noted. The tongue was mobile and midline.   Throat - The walls of the oropharynx were smooth, pink, moist, symmetric, and had no lesions or ulcerations.  The tonsillar pillars and soft palate were symmetric. The uvula was midline on elevation.    Neck - Normal midline excursion of the laryngotracheal complex during swallowing.  Full range of motion on passive movement.  Palpation of the occipital, submental, submandibular, internal jugular chain, and supraclavicular nodes did not demonstrate any abnormal lymph nodes or masses.  Palpation of the thyroid was soft and smooth, with no nodules or goiter appreciated.  The trachea was mobile and midline.  Nose - External contour is symmetric, no gross deflection or scars.  Nasal mucosa is pink and moist with no abnormal mucus.  The septum and turbinates were evaluated with nasal speculum, the inferior turbinates continue to be enlarged, no polyps, masses, or purulence noted on examination.        ASSESSMENT/ PLAN:       ICD-10-CM    1. Otorrhea, right  H92.11 ciprofloxacin-dexAMETHasone (CIPRODEX) 0.3-0.1 % otic suspension      2. Perforation of tympanic membrane, right  H72.91       3. Mixed conductive and sensorineural hearing loss of right ear with restricted hearing of left ear  H90.A31       4. Sensorineural hearing loss (SNHL) of left ear with restricted hearing of right ear  H90.A22             Start Ciprodex 4 drops twice a day for 7 days to right ear  Water precautions until next visit  Perforation remains present.   Monitor right EAC may require cauterization.     Follow up in 3-4 weeks for ear check  Complete audiogram in future.       Rocio Tuttle PA-C  ENT  [facility], [place]      Again, thank you for allowing me to participate in the care of your patient.         Sincerely,        Rocio Tuttle PA-C

## 2024-02-05 ENCOUNTER — TRANSFERRED RECORDS (OUTPATIENT)
Dept: HEALTH INFORMATION MANAGEMENT | Facility: CLINIC | Age: 69
End: 2024-02-05
Payer: COMMERCIAL

## 2024-02-28 NOTE — PROGRESS NOTES
Chief Complaint   Patient presents with    Follow Up     Ear recheck     Patient returns to ENT for follow up exam. She was last seen on 1/23/24 for otorrhea.  She has been doing well.  She reports that there has been no otorrhea or fullness.     Audiogram completed 11/8/2022:  TymDenies facial numbness or weakness.   She has bilateral tinnitus.   She has continued tinnitus in left ear.   No recent otalgia.   She has continued stuffiness in her right ear intermittently. panograms are Type A for left ear suggesting normal eardrum mobility and Type B with large volume right ear-repeatable.  Acoustic Reflex Thresholds at 1000 Hz are present only ipsilateral right.  Thresholds are normal sloping to mild sensorineural left and normal right sloping to severe mixed loss.  Speech reception thresholds are in good agreement with pure tone average.  Word discrimination scores are excellent at supra-thresholds level.        Audiogram-2/28/2023  Right ear only  Tympanograms  Type B large volume, perforation right.  Thresholds are normal to severe mixed loss, right.  SRT=PTA  WRS-  Right- 100%@70dB       Past Medical History:   Diagnosis Date    Glaucoma     Menopausal or female climacteric states 08/21/2000    Routine general medical examination at [facility] 08/18/2000        Allergies   Allergen Reactions    Amoxicillin-Pot Clavulanate Cramps     Current Outpatient Medications   Medication    amitriptyline (ELAVIL) 50 MG tablet    budesonide (PULMICORT) 0.5 MG/2ML neb solution    fluticasone (FLONASE) 50 MCG/ACT nasal spray    levothyroxine (SYNTHROID/LEVOTHROID) 25 MCG tablet    polymixin b-trimethoprim (POLYTRIM) 15998-8.1 UNIT/ML-% ophthalmic solution    travoprost MICHELLE FREE (TRAVATAN Z) 0.004 % ophthalmic solution    CRANBERRY    estradiol (VAGIFEM) 10 MCG TABS vaginal tablet    fish oil-omega-3 fatty acids 1000 MG capsule    fluticasone (FLONASE) 50 MCG/ACT nasal spray    L-FORMULA LYSINE HCL PO    Multiple Vitamin (DAILY  "MULTIVITAMIN PO)    UNKNOWN TO PATIENT     No current facility-administered medications for this visit.     ROS - SEE HPI    /78 (BP Location: Right arm, Patient Position: Sitting, Cuff Size: Adult Regular)   Pulse 88   Temp 97.8  F (36.6  C) (Tympanic)   Resp 18   Ht 1.6 m (5' 3\")   Wt 72.6 kg (160 lb)   SpO2 98%   BMI 28.34 kg/m         General - The patient is well nourished and well developed, and appears to have good nutritional status.  Alert and oriented to person and place, answers questions and cooperates with examination appropriately.   Head and Face - Normocephalic and atraumatic, with no gross asymmetry noted.  The facial nerve is intact, with strong symmetric movements.  Voice and Breathing - The patient was breathing comfortably without the use of accessory muscles. There was no wheezing, stridor. The patients voice was clear and strong, and had appropriate pitch and quality.  Ears - External ear normal. The ears were examined under binocular microscopy and with ototsocpe.  Right TM has monomeric inferior area with posterior inferior microperforation.   Posterior superior with thick tympanosclerosis.  EAC is greatly improved.  There is no granulation tissue or lesion present.  Left tympanic membrane appears intact without effusion or retraction.  Eyes - Extraocular movements intact, sclera were not icteric or injected.  Mouth - Examination of the oral cavity showed pink, healthy oral mucosa. Dentition in good condition. No lesions or ulcerations noted. The tongue was mobile and midline.   Throat - The walls of the oropharynx were smooth, pink, moist, symmetric, and had no lesions or ulcerations.  The tonsillar pillars and soft palate were symmetric. The uvula was midline on elevation.    Neck - Normal midline excursion of the laryngotracheal complex during swallowing.  Full range of motion on passive movement.  Palpation of the occipital, submental, submandibular, internal jugular chain, " and supraclavicular nodes did not demonstrate any abnormal lymph nodes or masses.  Palpation of the thyroid was soft and smooth, with no nodules or goiter appreciated.  The trachea was mobile and midline.  Nose - External contour is symmetric, no gross deflection or scars.  Nasal mucosa is pink and moist with no abnormal mucus.  The septum and turbinates were evaluated with nasal speculum, the inferior turbinates continue to be enlarged, no polyps, masses, or purulence noted on examination.        ASSESSMENT/ PLAN:    ICD-10-CM    1. Perforation of tympanic membrane, right  H72.91       2. Mixed conductive and sensorineural hearing loss of right ear with restricted hearing of left ear  H90.A31       3. Sensorineural hearing loss (SNHL) of left ear with restricted hearing of right ear  H90.A22           Complete audiogram.   Continue w/ strict water precautions.   Ear look stable. Small microperforation   No drainage on exam  If any concerns return to ENT sooner.   Follow up in 6 months       Rocio Tuttle PA-C  ENT  [facility], [place]

## 2024-02-29 ENCOUNTER — OFFICE VISIT (OUTPATIENT)
Dept: OTOLARYNGOLOGY | Facility: OTHER | Age: 69
End: 2024-02-29
Attending: PHYSICIAN ASSISTANT
Payer: COMMERCIAL

## 2024-02-29 ENCOUNTER — OFFICE VISIT (OUTPATIENT)
Dept: AUDIOLOGY | Facility: OTHER | Age: 69
End: 2024-02-29
Attending: AUDIOLOGIST
Payer: COMMERCIAL

## 2024-02-29 VITALS
RESPIRATION RATE: 18 BRPM | OXYGEN SATURATION: 98 % | WEIGHT: 160 LBS | BODY MASS INDEX: 28.35 KG/M2 | SYSTOLIC BLOOD PRESSURE: 116 MMHG | TEMPERATURE: 97.8 F | DIASTOLIC BLOOD PRESSURE: 78 MMHG | HEART RATE: 88 BPM | HEIGHT: 63 IN

## 2024-02-29 DIAGNOSIS — H90.A31 MIXED CONDUCTIVE AND SENSORINEURAL HEARING LOSS OF RIGHT EAR WITH RESTRICTED HEARING OF LEFT EAR: Primary | ICD-10-CM

## 2024-02-29 DIAGNOSIS — H90.A22 SENSORINEURAL HEARING LOSS (SNHL) OF LEFT EAR WITH RESTRICTED HEARING OF RIGHT EAR: ICD-10-CM

## 2024-02-29 DIAGNOSIS — H90.A31 MIXED CONDUCTIVE AND SENSORINEURAL HEARING LOSS OF RIGHT EAR WITH RESTRICTED HEARING OF LEFT EAR: ICD-10-CM

## 2024-02-29 DIAGNOSIS — H72.91 PERFORATION OF TYMPANIC MEMBRANE, RIGHT: Primary | ICD-10-CM

## 2024-02-29 PROCEDURE — 92504 EAR MICROSCOPY EXAMINATION: CPT | Performed by: PHYSICIAN ASSISTANT

## 2024-02-29 PROCEDURE — 92557 COMPREHENSIVE HEARING TEST: CPT | Performed by: AUDIOLOGIST

## 2024-02-29 PROCEDURE — 99213 OFFICE O/P EST LOW 20 MIN: CPT | Mod: 25 | Performed by: PHYSICIAN ASSISTANT

## 2024-02-29 PROCEDURE — 92567 TYMPANOMETRY: CPT | Performed by: AUDIOLOGIST

## 2024-02-29 PROCEDURE — G0463 HOSPITAL OUTPT CLINIC VISIT: HCPCS

## 2024-02-29 ASSESSMENT — PAIN SCALES - GENERAL: PAINLEVEL: NO PAIN (0)

## 2024-02-29 NOTE — PROGRESS NOTES
Audiology Evaluation Completed. Please refer SCANNED AUDIOGRAM and/or TYMPANOGRAM for BACKGROUND, RESULTS, RECOMMENDATIONS.      Regi GUNN, [facility]  Audiologist #[id number]

## 2024-02-29 NOTE — LETTER
2/29/2024         RE: Laney Guzman  [address]  [place] [facility] [place] [address]        Dear Colleague,    Thank you for referring your patient, Laney Guzman, to the [facility] [place]. Please see a copy of my visit note below.    Chief Complaint   Patient presents with     Follow Up     Ear recheck     Patient returns to ENT for follow up exam. She was last seen on 1/23/24 for otorrhea.  She has been doing well.  She reports that there has been no otorrhea or fullness.     Audiogram completed 11/8/2022:  TymDenies facial numbness or weakness.   She has bilateral tinnitus.   She has continued tinnitus in left ear.   No recent otalgia.   She has continued stuffiness in her right ear intermittently. panograms are Type A for left ear suggesting normal eardrum mobility and Type B with large volume right ear-repeatable.  Acoustic Reflex Thresholds at 1000 Hz are present only ipsilateral right.  Thresholds are normal sloping to mild sensorineural left and normal right sloping to severe mixed loss.  Speech reception thresholds are in good agreement with pure tone average.  Word discrimination scores are excellent at supra-thresholds level.        Audiogram-2/28/2023  Right ear only  Tympanograms  Type B large volume, perforation right.  Thresholds are normal to severe mixed loss, right.  SRT=PTA  WRS-  Right- 100%@70dB       Past Medical History:   Diagnosis Date     Glaucoma      Menopausal or female climacteric states 08/21/2000     Routine general medical examination at [facility] 08/18/2000        Allergies   Allergen Reactions     Amoxicillin-Pot Clavulanate Cramps     Current Outpatient Medications   Medication     amitriptyline (ELAVIL) 50 MG tablet     budesonide (PULMICORT) 0.5 MG/2ML neb solution     fluticasone (FLONASE) 50 MCG/ACT nasal spray     levothyroxine (SYNTHROID/LEVOTHROID) 25 MCG tablet     polymixin b-trimethoprim (POLYTRIM) 26560-5.1 UNIT/ML-% ophthalmic solution      "travoprost MICHELLE FREE (TRAVATAN Z) 0.004 % ophthalmic solution     CRANBERRY     estradiol (VAGIFEM) 10 MCG TABS vaginal tablet     fish oil-omega-3 fatty acids 1000 MG capsule     fluticasone (FLONASE) 50 MCG/ACT nasal spray     L-FORMULA LYSINE HCL PO     Multiple Vitamin (DAILY MULTIVITAMIN PO)     UNKNOWN TO PATIENT     No current facility-administered medications for this visit.     ROS - SEE HPI    /78 (BP Location: Right arm, Patient Position: Sitting, Cuff Size: Adult Regular)   Pulse 88   Temp 97.8  F (36.6  C) (Tympanic)   Resp 18   Ht 1.6 m (5' 3\")   Wt 72.6 kg (160 lb)   SpO2 98%   BMI 28.34 kg/m         General - The patient is well nourished and well developed, and appears to have good nutritional status.  Alert and oriented to person and place, answers questions and cooperates with examination appropriately.   Head and Face - Normocephalic and atraumatic, with no gross asymmetry noted.  The facial nerve is intact, with strong symmetric movements.  Voice and Breathing - The patient was breathing comfortably without the use of accessory muscles. There was no wheezing, stridor. The patients voice was clear and strong, and had appropriate pitch and quality.  Ears - External ear normal. The ears were examined under binocular microscopy and with ototsocpe.  Right TM has monomeric inferior area with posterior inferior microperforation.   Posterior superior with thick tympanosclerosis.  EAC is greatly improved.  There is no granulation tissue or lesion present.  Left tympanic membrane appears intact without effusion or retraction.  Eyes - Extraocular movements intact, sclera were not icteric or injected.  Mouth - Examination of the oral cavity showed pink, healthy oral mucosa. Dentition in good condition. No lesions or ulcerations noted. The tongue was mobile and midline.   Throat - The walls of the oropharynx were smooth, pink, moist, symmetric, and had no lesions or ulcerations.  The tonsillar " pillars and soft palate were symmetric. The uvula was midline on elevation.    Neck - Normal midline excursion of the laryngotracheal complex during swallowing.  Full range of motion on passive movement.  Palpation of the occipital, submental, submandibular, internal jugular chain, and supraclavicular nodes did not demonstrate any abnormal lymph nodes or masses.  Palpation of the thyroid was soft and smooth, with no nodules or goiter appreciated.  The trachea was mobile and midline.  Nose - External contour is symmetric, no gross deflection or scars.  Nasal mucosa is pink and moist with no abnormal mucus.  The septum and turbinates were evaluated with nasal speculum, the inferior turbinates continue to be enlarged, no polyps, masses, or purulence noted on examination.        ASSESSMENT/ PLAN:    ICD-10-CM    1. Perforation of tympanic membrane, right  H72.91       2. Mixed conductive and sensorineural hearing loss of right ear with restricted hearing of left ear  H90.A31       3. Sensorineural hearing loss (SNHL) of left ear with restricted hearing of right ear  H90.A22           Complete audiogram.   Continue w/ strict water precautions.   Ear look stable. Small microperforation   No drainage on exam  If any concerns return to ENT sooner.   Follow up in 6 months       Rocio Tuttle PA-C  ENT  [facility], [place]           Again, thank you for allowing me to participate in the care of your patient.        Sincerely,        Rocio Tuttle PA-C

## 2024-02-29 NOTE — PATIENT INSTRUCTIONS
Complete audiogram.   Continue w/ strict water precautions.   Ear look stable. Small microperforation   No drainage on exam  If any concerns return to ENT sooner.   Follow up in 6 months         Thank you for allowing EZEQUIEL Carreno and our ENT team to participate in your care.  If your medications are too expensive, please give the nurse a call.  We can possibly change this medication.  If you have a scheduling or an appointment question please contact our Health Unit Coordinator at their direct line [phone].   ALL nursing questions or concerns can be directed to your ENT nurse, Marleen at: [phone].

## 2024-04-28 ENCOUNTER — HEALTH MAINTENANCE LETTER (OUTPATIENT)
Age: 69
End: 2024-04-28

## 2024-05-10 ENCOUNTER — TELEPHONE (OUTPATIENT)
Dept: FAMILY MEDICINE | Facility: OTHER | Age: 69
End: 2024-05-10

## 2024-05-11 ENCOUNTER — OFFICE VISIT (OUTPATIENT)
Dept: FAMILY MEDICINE | Facility: OTHER | Age: 69
End: 2024-05-11
Attending: STUDENT IN AN ORGANIZED HEALTH CARE EDUCATION/TRAINING PROGRAM
Payer: COMMERCIAL

## 2024-05-11 VITALS
SYSTOLIC BLOOD PRESSURE: 138 MMHG | DIASTOLIC BLOOD PRESSURE: 86 MMHG | RESPIRATION RATE: 16 BRPM | OXYGEN SATURATION: 99 % | TEMPERATURE: 98.1 F | HEIGHT: 63 IN | WEIGHT: 160 LBS | HEART RATE: 88 BPM | BODY MASS INDEX: 28.35 KG/M2

## 2024-05-11 DIAGNOSIS — H65.02 NON-RECURRENT ACUTE SEROUS OTITIS MEDIA OF LEFT EAR: Primary | ICD-10-CM

## 2024-05-11 PROCEDURE — G0463 HOSPITAL OUTPT CLINIC VISIT: HCPCS | Performed by: STUDENT IN AN ORGANIZED HEALTH CARE EDUCATION/TRAINING PROGRAM

## 2024-05-11 PROCEDURE — 99213 OFFICE O/P EST LOW 20 MIN: CPT | Performed by: STUDENT IN AN ORGANIZED HEALTH CARE EDUCATION/TRAINING PROGRAM

## 2024-05-11 RX ORDER — CEFDINIR 300 MG/1
300 CAPSULE ORAL 2 TIMES DAILY
Qty: 14 CAPSULE | Refills: 0 | Status: SHIPPED | OUTPATIENT
Start: 2024-05-11 | End: 2024-05-18

## 2024-05-11 ASSESSMENT — PAIN SCALES - GENERAL: PAINLEVEL: MODERATE PAIN (4)

## 2024-05-11 NOTE — PATIENT INSTRUCTIONS
Left Sided Ear Infection    Omnicef twice a day for one week.    Continue prescriptions as prescribed by ENT.    Allergy medications.    Follow up with ENT for persisting symptoms.    Return to rapid clinic or ER for worsening symptoms.

## 2024-05-11 NOTE — NURSING NOTE
"Chief Complaint   Patient presents with    Otalgia    Sinus Problem     Drainage and sore throat       Initial /86   Pulse 88   Temp 98.1  F (36.7  C) (Temporal)   Resp 16   Ht 1.6 m (5' 3\")   Wt 72.6 kg (160 lb)   LMP 05/11/2004 (Approximate)   SpO2 99%   Breastfeeding No   BMI 28.34 kg/m   Estimated body mass index is 28.34 kg/m  as calculated from the following:    Height as of this encounter: 1.6 m (5' 3\").    Weight as of this encounter: 72.6 kg (160 lb).  Medication Review: complete    The next two questions are to help us understand your food security.  If you are feeling you need any assistance in this area, we have resources available to support you today.          1/5/2024   SDOH- Food Insecurity   Within the past 12 months, did you worry that your food would run out before you got money to buy more? N   Within the past 12 months, did the food you bought just not last and you didn t have money to get more? N         Health Care Directive:  Patient does not have a Health Care Directive or Living Will: Discussed advance care planning with patient; however, patient declined at this time.    Norma J. Gosselin, LPN      "

## 2024-05-17 NOTE — TELEPHONE ENCOUNTER
Attempt # 2  Outcome: Talked With Family Member  Comment: Attempted to reach patient to schedule a physical per quality list. Patient family member answered. Asked family to have patient call back in regard to scheduling an appointment.

## 2024-06-23 DIAGNOSIS — E03.9 ACQUIRED HYPOTHYROIDISM: ICD-10-CM

## 2024-06-24 RX ORDER — LEVOTHYROXINE SODIUM 25 UG/1
25 TABLET ORAL DAILY
Qty: 90 TABLET | Refills: 3 | Status: SHIPPED | OUTPATIENT
Start: 2024-06-24

## 2024-06-24 NOTE — TELEPHONE ENCOUNTER
Synthroid      Last Written Prescription Date:  12/29/23  Last Fill Quantity: 90,   # refills: 1  Last Office Visit: 1/9/24  Future Office visit:    Next 5 appointments (look out 90 days)      Aug 29, 2024 1:30 PM  (Arrive by 1:15 PM)  Return Visit with Rocio Tuttle PA-C  [facility] ([facility] ) [address] [place] [address]  [phone]             Routing refill request to provider for review/approval because:

## 2024-07-31 ENCOUNTER — TELEPHONE (OUTPATIENT)
Dept: OTOLARYNGOLOGY | Facility: OTHER | Age: 69
End: 2024-07-31

## 2024-07-31 DIAGNOSIS — R09.81 CONGESTION OF PARANASAL SINUS: Primary | ICD-10-CM

## 2024-07-31 RX ORDER — FLUTICASONE PROPIONATE 50 MCG
2 SPRAY, SUSPENSION (ML) NASAL DAILY
Qty: 16 G | Refills: 11 | Status: SHIPPED | OUTPATIENT
Start: 2024-07-31

## 2024-07-31 NOTE — CONFIDENTIAL NOTE
July 31, 2024    Reason for call:  Medication      Have you contacted your pharmacy? Yes   If patient has contacted Pharmacy and it has been over 72hrs, continue to #2  Medication: Flonase  What Pharmacy do you use? [facility]  Patient requesting call back from ENT nurse regarding refill please      (Please note that the turn-around-time for prescriptions is 72 business hours; I am sending your request at this time. SEND TO appropriate Care Team Pool )

## 2024-08-28 NOTE — PROGRESS NOTES
Chief Complaint   Patient presents with    RECHECK     6 month ear recheck     Patient returns to ENT for follow up exam. She was last seen on 1/23/24 for otorrhea.  She has been doing well.  She reports that there has been no otorrhea or fullness. She reports there is some plugged feeling and soreness at times.   Hearing worse in right ear, but stable. Intermittent right otalgia which is described as a dull ache.   No vertigo/ dizziness.   Left ear infection in May, reports two in the last year. Symptoms clear with abx.   She does clench/ grind and does use dental guard.       Denies facial numbness or weakness.   She has bilateral tinnitus.   She has continued tinnitus in left ear.   No recent otalgia.   She has continued stuffiness in her right ear intermittently.     Audiogram-2/28/2023  Right ear only  Tympanograms  Type B large volume, perforation right.  Thresholds are normal to severe mixed loss, right.  SRT=PTA  WRS-  Right- 100%@70dB       Tympanograms are Type A for left ear suggesting normal eardrum mobility and Type B with large volume right ear-repeatable.  Acoustic Reflex Thresholds at 1000 Hz are present only ipsilateral right.  Thresholds are normal sloping to mild sensorineural left and normal right sloping to severe mixed loss.  Speech reception thresholds are in good agreement with pure tone average.  Word discrimination scores are excellent at supra-thresholds level.          Past Medical History:   Diagnosis Date    Glaucoma     Menopausal or female climacteric states 08/21/2000    Routine general medical examination at [facility] 08/18/2000        Allergies   Allergen Reactions    Amoxicillin-Pot Clavulanate Cramps     Current Outpatient Medications   Medication Sig Dispense Refill    amitriptyline (ELAVIL) 50 MG tablet Take 1 tablet by mouth daily at 2 pm      budesonide (PULMICORT) 0.5 MG/2ML neb solution MAKE 240 ML LUX MED SINUS IRRIGATION MIX 2 ML VIAL OF BUDESONIDE 0.5MG AND RINSE  "TWICE DAILY 120 mL 4    estradiol (VAGIFEM) 10 MCG TABS vaginal tablet Place 1 tablet (10 mcg) vaginally twice a week 24 tablet 3    fluticasone (FLONASE) 50 MCG/ACT nasal spray Spray 2 sprays into both nostrils daily 16 g 11    fluticasone (FLONASE) 50 MCG/ACT nasal spray Spray 2 sprays into both nostrils daily 16 g 11    levothyroxine (SYNTHROID/LEVOTHROID) 25 MCG tablet TAKE 1 TABLET (25 MCG) BY MOUTH DAILY 90 tablet 3    travoprost MICHELLE FREE (TRAVATAN Z) 0.004 % ophthalmic solution Apply 1 drop to eye daily       No current facility-administered medications for this visit.     ROS- SEE HPI  BP (!) 145/83 (BP Location: Right arm, Patient Position: Sitting, Cuff Size: Adult Regular)   Pulse 84   Temp 98.2  F (36.8  C) (Tympanic)   Ht 1.6 m (5' 3\")   Wt 70.3 kg (155 lb)   LMP 05/11/2004 (Approximate)   SpO2 99%   BMI 27.46 kg/m      General - The patient is well nourished and well developed, and appears to have good nutritional status.  Alert and oriented to person and place, answers questions and cooperates with examination appropriately.   Head and Face - Normocephalic and atraumatic, with no gross asymmetry noted.  The facial nerve is intact, with strong symmetric movements.  Voice and Breathing - The patient was breathing comfortably without the use of accessory muscles. There was no wheezing, stridor. The patients voice was clear and strong, and had appropriate pitch and quality.  Ears - External ear normal. The ears were examined under binocular microscopy and with ototsocpe.  Right TM has monomeric inferior area with posterior inferior microperforation.  Retraction central Posterior superior with thick tympanosclerosis.  EAC is greatly improved.  There is no granulation tissue or lesion present.  Left tympanic membrane appears intact without effusion or retraction.  Eyes - Extraocular movements intact, sclera were not icteric or injected.  Mouth - Examination of the oral cavity showed pink, healthy " oral mucosa. Dentition in good condition. No lesions or ulcerations noted. The tongue was mobile and midline.   Throat - The walls of the oropharynx were smooth, pink, moist, symmetric, and had no lesions or ulcerations.  The tonsillar pillars and soft palate were symmetric. The uvula was midline on elevation.    Neck - Normal midline excursion of the laryngotracheal complex during swallowing.  Full range of motion on passive movement.  Palpation of the occipital, submental, submandibular, internal jugular chain, and supraclavicular nodes did not demonstrate any abnormal lymph nodes or masses.  Palpation of the thyroid was soft and smooth, with no nodules or goiter appreciated.  The trachea was mobile and midline.  Nose - External contour is symmetric, no gross deflection or scars.  Nasal mucosa is pink and moist with no abnormal mucus.  The septum and turbinates were evaluated with nasal speculum, the inferior turbinates continue to be enlarged, no polyps, masses, or purulence noted on examination.         ASSESSMENT/ PLAN:    ICD-10-CM    1. Perforation of tympanic membrane, right  H72.91 fluticasone (FLONASE) 50 MCG/ACT nasal spray     CT Temporal Bones wo Contrast      2. Mixed conductive and sensorineural hearing loss of right ear with restricted hearing of left ear  H90.A31 CT Temporal Bones wo Contrast      3. Sensorineural hearing loss (SNHL) of left ear with restricted hearing of right ear  H90.A22 CT Temporal Bones wo Contrast      4. Otalgia, right  H92.01 fluticasone (FLONASE) 50 MCG/ACT nasal spray     CT Temporal Bones wo Contrast      5. Retracted tympanic membrane, right  H73.891             Complete CT of ear, temporal bone.   Use Flonase 2 sprays to each nostril daily.   Continue w/ dental guard at night.     No drainage on exam  If any concerns return to ENT sooner.   Follow up in 6 months

## 2024-08-29 ENCOUNTER — OFFICE VISIT (OUTPATIENT)
Dept: OTOLARYNGOLOGY | Facility: OTHER | Age: 69
End: 2024-08-29
Attending: PHYSICIAN ASSISTANT
Payer: COMMERCIAL

## 2024-08-29 VITALS
BODY MASS INDEX: 27.46 KG/M2 | OXYGEN SATURATION: 99 % | HEART RATE: 84 BPM | TEMPERATURE: 98.2 F | SYSTOLIC BLOOD PRESSURE: 145 MMHG | HEIGHT: 63 IN | WEIGHT: 155 LBS | DIASTOLIC BLOOD PRESSURE: 83 MMHG

## 2024-08-29 DIAGNOSIS — H92.01 OTALGIA, RIGHT: ICD-10-CM

## 2024-08-29 DIAGNOSIS — H90.A22 SENSORINEURAL HEARING LOSS (SNHL) OF LEFT EAR WITH RESTRICTED HEARING OF RIGHT EAR: ICD-10-CM

## 2024-08-29 DIAGNOSIS — H73.891 RETRACTED TYMPANIC MEMBRANE, RIGHT: ICD-10-CM

## 2024-08-29 DIAGNOSIS — H90.A31 MIXED CONDUCTIVE AND SENSORINEURAL HEARING LOSS OF RIGHT EAR WITH RESTRICTED HEARING OF LEFT EAR: ICD-10-CM

## 2024-08-29 DIAGNOSIS — H72.91 PERFORATION OF TYMPANIC MEMBRANE, RIGHT: Primary | ICD-10-CM

## 2024-08-29 PROCEDURE — 92504 EAR MICROSCOPY EXAMINATION: CPT | Performed by: PHYSICIAN ASSISTANT

## 2024-08-29 PROCEDURE — G0463 HOSPITAL OUTPT CLINIC VISIT: HCPCS

## 2024-08-29 PROCEDURE — 99213 OFFICE O/P EST LOW 20 MIN: CPT | Mod: 25 | Performed by: PHYSICIAN ASSISTANT

## 2024-08-29 RX ORDER — FLUTICASONE PROPIONATE 50 MCG
2 SPRAY, SUSPENSION (ML) NASAL DAILY
Qty: 16 G | Refills: 11 | Status: SHIPPED | OUTPATIENT
Start: 2024-08-29

## 2024-08-29 ASSESSMENT — PAIN SCALES - GENERAL: PAINLEVEL: MILD PAIN (2)

## 2024-08-29 NOTE — PATIENT INSTRUCTIONS
Complete CT of ear, temporal bone.   Use Flonase 2 sprays to each nostril daily.   Continue w/ dental guard at night.       Thank you for allowing Rocio Tuttle PA-C and our ENT team to participate in your care.  If your medications are too expensive, please give the nurse a call.  We can possibly change this medication.  If you have a scheduling or an appointment question please contact our Health Unit Coordinator at [phone], Ext. [phone].    ALL nursing questions or concerns can be directed to your ENT nurse at: [phone] Marleen

## 2024-08-29 NOTE — LETTER
8/29/2024      Laney Guzman  [address]  [facility] [address]      Dear Colleague,    Thank you for referring your patient, Laney Guzman, to the [facility]. Please see a copy of my visit note below.    Chief Complaint   Patient presents with     RECHECK     6 month ear recheck     Patient returns to ENT for follow up exam. She was last seen on 1/23/24 for otorrhea.  She has been doing well.  She reports that there has been no otorrhea or fullness. She reports there is some plugged feeling and soreness at times.   Hearing worse in right ear, but stable. Intermittent right otalgia which is described as a dull ache.   No vertigo/ dizziness.   Left ear infection in May, reports two in the last year. Symptoms clear with abx.   She does clench/ grind and does use dental guard.       Denies facial numbness or weakness.   She has bilateral tinnitus.   She has continued tinnitus in left ear.   No recent otalgia.   She has continued stuffiness in her right ear intermittently.     Audiogram-2/28/2023  Right ear only  Tympanograms  Type B large volume, perforation right.  Thresholds are normal to severe mixed loss, right.  SRT=PTA  WRS-  Right- 100%@70dB       Tympanograms are Type A for left ear suggesting normal eardrum mobility and Type B with large volume right ear-repeatable.  Acoustic Reflex Thresholds at 1000 Hz are present only ipsilateral right.  Thresholds are normal sloping to mild sensorineural left and normal right sloping to severe mixed loss.  Speech reception thresholds are in good agreement with pure tone average.  Word discrimination scores are excellent at supra-thresholds level.          Past Medical History:   Diagnosis Date     Glaucoma      Menopausal or female climacteric states 08/21/2000     Routine general medical examination at [facility] 08/18/2000        Allergies   Allergen Reactions     Amoxicillin-Pot Clavulanate Cramps     Current Outpatient  "Medications   Medication Sig Dispense Refill     amitriptyline (ELAVIL) 50 MG tablet Take 1 tablet by mouth daily at 2 pm       budesonide (PULMICORT) 0.5 MG/2ML neb solution MAKE 240 ML LUX MED SINUS IRRIGATION MIX 2 ML VIAL OF BUDESONIDE 0.5MG AND RINSE TWICE DAILY 120 mL 4     estradiol (VAGIFEM) 10 MCG TABS vaginal tablet Place 1 tablet (10 mcg) vaginally twice a week 24 tablet 3     fluticasone (FLONASE) 50 MCG/ACT nasal spray Spray 2 sprays into both nostrils daily 16 g 11     fluticasone (FLONASE) 50 MCG/ACT nasal spray Spray 2 sprays into both nostrils daily 16 g 11     levothyroxine (SYNTHROID/LEVOTHROID) 25 MCG tablet TAKE 1 TABLET (25 MCG) BY MOUTH DAILY 90 tablet 3     travoprost MICHELLE FREE (TRAVATAN Z) 0.004 % ophthalmic solution Apply 1 drop to eye daily       No current facility-administered medications for this visit.     ROS- SEE HPI  BP (!) 145/83 (BP Location: Right arm, Patient Position: Sitting, Cuff Size: Adult Regular)   Pulse 84   Temp 98.2  F (36.8  C) (Tympanic)   Ht 1.6 m (5' 3\")   Wt 70.3 kg (155 lb)   LMP 05/11/2004 (Approximate)   SpO2 99%   BMI 27.46 kg/m      General - The patient is well nourished and well developed, and appears to have good nutritional status.  Alert and oriented to person and place, answers questions and cooperates with examination appropriately.   Head and Face - Normocephalic and atraumatic, with no gross asymmetry noted.  The facial nerve is intact, with strong symmetric movements.  Voice and Breathing - The patient was breathing comfortably without the use of accessory muscles. There was no wheezing, stridor. The patients voice was clear and strong, and had appropriate pitch and quality.  Ears - External ear normal. The ears were examined under binocular microscopy and with ototsocpe.  Right TM has monomeric inferior area with posterior inferior microperforation.  Retraction central Posterior superior with thick tympanosclerosis.  EAC is greatly improved. "  There is no granulation tissue or lesion present.  Left tympanic membrane appears intact without effusion or retraction.  Eyes - Extraocular movements intact, sclera were not icteric or injected.  Mouth - Examination of the oral cavity showed pink, healthy oral mucosa. Dentition in good condition. No lesions or ulcerations noted. The tongue was mobile and midline.   Throat - The walls of the oropharynx were smooth, pink, moist, symmetric, and had no lesions or ulcerations.  The tonsillar pillars and soft palate were symmetric. The uvula was midline on elevation.    Neck - Normal midline excursion of the laryngotracheal complex during swallowing.  Full range of motion on passive movement.  Palpation of the occipital, submental, submandibular, internal jugular chain, and supraclavicular nodes did not demonstrate any abnormal lymph nodes or masses.  Palpation of the thyroid was soft and smooth, with no nodules or goiter appreciated.  The trachea was mobile and midline.  Nose - External contour is symmetric, no gross deflection or scars.  Nasal mucosa is pink and moist with no abnormal mucus.  The septum and turbinates were evaluated with nasal speculum, the inferior turbinates continue to be enlarged, no polyps, masses, or purulence noted on examination.         ASSESSMENT/ PLAN:    ICD-10-CM    1. Perforation of tympanic membrane, right  H72.91 fluticasone (FLONASE) 50 MCG/ACT nasal spray     CT Temporal Bones wo Contrast      2. Mixed conductive and sensorineural hearing loss of right ear with restricted hearing of left ear  H90.A31 CT Temporal Bones wo Contrast      3. Sensorineural hearing loss (SNHL) of left ear with restricted hearing of right ear  H90.A22 CT Temporal Bones wo Contrast      4. Otalgia, right  H92.01 fluticasone (FLONASE) 50 MCG/ACT nasal spray     CT Temporal Bones wo Contrast      5. Retracted tympanic membrane, right  H73.891             Complete CT of ear, temporal bone.   Use Flonase 2  sprays to each nostril daily.   Continue w/ dental guard at night.     No drainage on exam  If any concerns return to ENT sooner.   Follow up in 6 months       Again, thank you for allowing me to participate in the care of your patient.        Sincerely,        Rocio Tuttle PA-C

## 2024-08-30 ENCOUNTER — HOSPITAL ENCOUNTER (OUTPATIENT)
Dept: CT IMAGING | Facility: HOSPITAL | Age: 69
Discharge: HOME | End: 2024-08-30
Attending: PHYSICIAN ASSISTANT | Admitting: PHYSICIAN ASSISTANT
Payer: COMMERCIAL

## 2024-08-30 DIAGNOSIS — H90.A22 SENSORINEURAL HEARING LOSS (SNHL) OF LEFT EAR WITH RESTRICTED HEARING OF RIGHT EAR: ICD-10-CM

## 2024-08-30 DIAGNOSIS — H92.01 OTALGIA, RIGHT: ICD-10-CM

## 2024-08-30 DIAGNOSIS — H72.91 PERFORATION OF TYMPANIC MEMBRANE, RIGHT: ICD-10-CM

## 2024-08-30 DIAGNOSIS — H90.A31 MIXED CONDUCTIVE AND SENSORINEURAL HEARING LOSS OF RIGHT EAR WITH RESTRICTED HEARING OF LEFT EAR: ICD-10-CM

## 2024-08-30 PROCEDURE — 70480 CT ORBIT/EAR/FOSSA W/O DYE: CPT

## 2024-09-06 ENCOUNTER — TELEPHONE (OUTPATIENT)
Dept: OTOLARYNGOLOGY | Facility: OTHER | Age: 69
End: 2024-09-06

## 2024-09-06 DIAGNOSIS — H92.01 OTALGIA, RIGHT: Primary | ICD-10-CM

## 2024-09-06 DIAGNOSIS — H92.11 OTORRHEA, RIGHT: ICD-10-CM

## 2024-09-06 RX ORDER — CEFDINIR 300 MG/1
300 CAPSULE ORAL 2 TIMES DAILY
Qty: 20 CAPSULE | Refills: 0 | Status: SHIPPED | OUTPATIENT
Start: 2024-09-06 | End: 2024-09-16

## 2024-09-06 NOTE — TELEPHONE ENCOUNTER
Spoke to Laney via phone to let her know per Rocio Tuttle: She can try an abx. Cefdinir twice a day for 10 days. She should tolerate this abx, She does have an allergy to Augmentin and stated cramping.   Discussed 2-5% risk of cross-allergy with cephalosporins.   Continue with Flonase, etc.    Make sure no jaw concerns - clenching/ grinding. Patient reported she is trying not to grind/clench as she has been using her mouth guard. Advised she may apply a warm compress above and below her ear, patient verbalized understanding.

## 2024-09-06 NOTE — TELEPHONE ENCOUNTER
She can try an abx.   Continue with Flonase, etc  Cefdinir twice a day for 10 days. She should tolerate this abx, She does have an allergy to Augmentin and stated cramping.   Discussed 2-5% risk of cross-allergy with cephalosporins     Make sure no jaw concerns - clenching/ grinding.     TR

## 2024-09-06 NOTE — TELEPHONE ENCOUNTER
Spoke to Laney via phone to let her know the CT results reviewed by Rocio Tuttle: No erosion, Fluid seen in right mastoid. Will follow clinically. Continue with Flonase. Jarod, reported she has been using her rani pot, mouth guard, and Flonase. She has been getting an earache from time to time in the right ear.  Described as, Intermittent dull pain, started about last week, it is not consistent. No drainage, no fevers/chills, denies worsening hearing. Wondering if she can get antibiotics for right ear sent to:[facility], in [place].  Advised will route message to Provider. Patient would like a call back on her cell phone ([phone]) as she does not check MyChart often.

## 2024-11-13 DIAGNOSIS — R09.81 CONGESTION OF PARANASAL SINUS: ICD-10-CM

## 2024-11-13 NOTE — TELEPHONE ENCOUNTER
Pulmicort       Last Written Prescription Date:  11/13/2024  Last Fill Quantity: 120mL,   # refills: 4  Last Office Visit: 8/29/2024  Future Office visit:    Next 5 appointments (look out 90 days)      Jan 06, 2025 2:00 PM  (Arrive by 1:45 PM)  Adult Preventative Visit with EZEQUIEL Denson  [facility] ([facility] [place] ) [address]  [place] [address]  [phone]

## 2024-11-14 RX ORDER — BUDESONIDE 0.5 MG/2ML
INHALANT ORAL
Qty: 120 ML | Refills: 4 | Status: SHIPPED | OUTPATIENT
Start: 2024-11-14

## 2024-11-14 NOTE — CONFIDENTIAL NOTE
Inhaled Steroids Protocol [id number]/2024 07:38 AM   Protocol Details Medication indicated for associated diagnosis

## 2024-11-25 ENCOUNTER — TELEPHONE (OUTPATIENT)
Dept: MAMMOGRAPHY | Facility: OTHER | Age: 69
End: 2024-11-25

## 2024-11-25 ENCOUNTER — ANCILLARY PROCEDURE (OUTPATIENT)
Dept: MAMMOGRAPHY | Facility: OTHER | Age: 69
End: 2024-11-25
Attending: PHYSICIAN ASSISTANT
Payer: COMMERCIAL

## 2024-11-25 DIAGNOSIS — Z12.31 VISIT FOR SCREENING MAMMOGRAM: ICD-10-CM

## 2024-11-25 PROCEDURE — 77063 BREAST TOMOSYNTHESIS BI: CPT | Mod: TC

## 2024-11-25 PROCEDURE — 77067 SCR MAMMO BI INCL CAD: CPT | Mod: TC

## 2025-01-06 ENCOUNTER — OFFICE VISIT (OUTPATIENT)
Dept: FAMILY MEDICINE | Facility: OTHER | Age: 70
End: 2025-01-06
Attending: PHYSICIAN ASSISTANT
Payer: COMMERCIAL

## 2025-01-06 VITALS
TEMPERATURE: 98.1 F | DIASTOLIC BLOOD PRESSURE: 74 MMHG | OXYGEN SATURATION: 98 % | WEIGHT: 162 LBS | RESPIRATION RATE: 16 BRPM | HEIGHT: 63 IN | BODY MASS INDEX: 28.7 KG/M2 | SYSTOLIC BLOOD PRESSURE: 120 MMHG | HEART RATE: 83 BPM

## 2025-01-06 DIAGNOSIS — H91.91 HIGH-FREQUENCY HEARING LOSS OF RIGHT EAR: ICD-10-CM

## 2025-01-06 DIAGNOSIS — Z23 NEED FOR VACCINATION: ICD-10-CM

## 2025-01-06 DIAGNOSIS — E03.9 ACQUIRED HYPOTHYROIDISM: ICD-10-CM

## 2025-01-06 DIAGNOSIS — Z23 NEED FOR PROPHYLACTIC VACCINATION AND INOCULATION AGAINST INFLUENZA: ICD-10-CM

## 2025-01-06 DIAGNOSIS — E78.00 HIGH CHOLESTEROL: ICD-10-CM

## 2025-01-06 DIAGNOSIS — Z00.00 MEDICARE ANNUAL WELLNESS VISIT, SUBSEQUENT: Primary | ICD-10-CM

## 2025-01-06 DIAGNOSIS — Z78.0 ASYMPTOMATIC POSTMENOPAUSAL ESTROGEN DEFICIENCY: ICD-10-CM

## 2025-01-06 DIAGNOSIS — Z23 HIGH PRIORITY FOR 2019-NCOV VACCINE: ICD-10-CM

## 2025-01-06 DIAGNOSIS — K58.2 IRRITABLE BOWEL SYNDROME WITH BOTH CONSTIPATION AND DIARRHEA: ICD-10-CM

## 2025-01-06 PROCEDURE — G0008 ADMIN INFLUENZA VIRUS VAC: HCPCS

## 2025-01-06 PROCEDURE — 90662 IIV NO PRSV INCREASED AG IM: CPT

## 2025-01-06 PROCEDURE — 90480 ADMN SARSCOV2 VAC 1/ONLY CMP: CPT

## 2025-01-06 SDOH — HEALTH STABILITY: PHYSICAL HEALTH: ON AVERAGE, HOW MANY DAYS PER WEEK DO YOU ENGAGE IN MODERATE TO STRENUOUS EXERCISE (LIKE A BRISK WALK)?: 5 DAYS

## 2025-01-06 ASSESSMENT — PAIN SCALES - GENERAL: PAINLEVEL_OUTOF10: NO PAIN (0)

## 2025-01-06 ASSESSMENT — SOCIAL DETERMINANTS OF HEALTH (SDOH): HOW OFTEN DO YOU GET TOGETHER WITH FRIENDS OR RELATIVES?: MORE THAN THREE TIMES A WEEK

## 2025-01-06 NOTE — PROGRESS NOTES
"Preventive Care Visit  [facility]  EZEQUIEL Luther, Family Medicine  Jan 6, 2025      Assessment & Plan     Medicare annual wellness visit, subsequent  She is doing well . Did retire and then she is now doing a part time job. We fill her thyroid medications and her GI MD does her Elavil medictions.  Eye exams every 6 months due to [place].  She is good at staying busy and exercise.     Need for prophylactic vaccination and inoculation against influenza  Given these today.     High priority for 2019-nCoV vaccine  Given this today.     High cholesterol  Has resisted her use of any type of cholesterol lowing agents.  She is not wanting to start but we will keep watching for a trend.  Lower risk . No hypertension or diabetes.  No Family hx of stroke or heart disease.   - CBC with platelets and differential; Future  - Comprehensive metabolic panel (BMP + Alb, Alk Phos, ALT, AST, Total. Bili, TP); Future  - Lipid Profile (Chol, Trig, HDL, LDL calc); Future    Acquired hypothyroidism  Recheck level.    - CBC with platelets and differential; Future  - TSH with free T4 reflex; Future    Need for vaccination  As above.     Irritable bowel syndrome with both constipation and diarrhea  Chronic issues.  Elavil helps.   - UA Macroscopic with reflex to Microscopic and Culture; Future    High-frequency hearing loss of right ear  She is not needing a hearing aide.      Asymptomatic postmenopausal estrogen deficiency  Due for this not taking calcium or supplements.  Recommended at least vit D for our long dark [place] vera.   - DX Bone Density; Future    Patient has been advised of split billing requirements and indicates understanding: Yes        BMI  Estimated body mass index is 29.16 kg/m  as calculated from the following:    Height as of this encounter: 1.588 m (5' 2.5\").    Weight as of this encounter: 73.5 kg (162 lb).   Weight management plan: Discussed healthy diet and exercise " guidelines    Counseling  Appropriate preventive services were addressed with this patient via screening, questionnaire, or discussion as appropriate for fall prevention, nutrition, physical activity, Tobacco-use cessation, social engagement, weight loss and cognition.  Checklist reviewing preventive services available has been given to the patient.  Reviewed patient's diet, addressing concerns and/or questions.   The patient was provided with written information regarding signs of hearing loss.       See Patient Instructions    No follow-ups on file.    Kayla Davison is a 69 year old, presenting for the following:  Physical, Imm/Inj (Flu Shot), and Imm/Inj (COVID-19 VACCINE)        1/6/2025     1:59 PM   Additional Questions   Roomed by Mark Nichole lpn   Accompanied by self           HPI    Hyperlipidemia Follow-Up    Are you regularly taking any medication or supplement to lower your cholesterol?   No  Are you having muscle aches or other side effects that you think could be caused by your cholesterol lowering medication?  No    Hypothyroidism Follow-up    Since last visit, patient describes the following symptoms: Weight stable, no hair loss, no skin changes, no constipation, no loose stools    Health Care Directive  Patient does not have a Health Care Directive: Discussed advance care planning with patient; however, patient declined at this time.      1/6/2025   General Health   How would you rate your overall physical health? Good   Feel stress (tense, anxious, or unable to sleep) Only a little   (!) STRESS CONCERN      1/6/2025   Nutrition   Diet: Gluten-free/reduced         1/6/2025   Exercise   Days per week of moderate/strenous exercise 5 days         1/6/2025   Social Factors   Frequency of gathering with friends or relatives More than three times a week   Worry food won't last until get money to buy more No   Food not last or not have enough money for food? No   Do you have housing? (Housing is  defined as stable permanent housing and does not include staying ouside in a car, in a tent, in an abandoned building, in an overnight shelter, or couch-surfing.) Yes   Are you worried about losing your housing? No   Lack of transportation? No   Unable to get utilities (heat,electricity)? No         1/6/2025   Fall Risk   Fallen 2 or more times in the past year? No   Trouble with walking or balance? No          1/6/2025   Activities of Daily Living- Home Safety   Needs help with the following daily activites None of the above   Safety concerns in the home None of the above         1/6/2025   Dental   Dentist two times every year? Yes         1/6/2025   Hearing Screening   Hearing concerns? (!) I NEED TO ASK PEOPLE TO SPEAK UP OR REPEAT THEMSELVES.    (!) TROUBLE UNDERSTANDING SOFT OR WHISPERED SPEECH.       Multiple values from one day are sorted in reverse-chronological order         1/6/2025   Driving Risk Screening   Patient/family members have concerns about driving No         1/6/2025   General Alertness/Fatigue Screening   Have you been more tired than usual lately? No         1/6/2025   Urinary Incontinence Screening   Bothered by leaking urine in past 6 months No         1/6/2025   TB Screening   Were you born outside of the [place]? No         Today's PHQ-2 Score:       1/6/2025     1:58 PM   PHQ-2 ( 1999 Pf[employer])   Q1: Little interest or pleasure in doing things 0   Q2: Feeling down, depressed or hopeless 0   PHQ-2 Score 0    Q1: Little interest or pleasure in doing things Not at all   Q2: Feeling down, depressed or hopeless Not at all   PHQ-2 Score 0       Patient-reported           1/6/2025   Substance Use   Alcohol more than 3/day or more than 7/wk No   Do you have a current opioid prescription? No   How severe/bad is pain from 1 to 10? 0/10 (No Pain)   Do you use any other substances recreationally? No     Social History     Tobacco Use    Smoking status: Never     Passive exposure: Never    Smokeless  tobacco: Never    Tobacco comments:     no passive exposure   Vaping Use    Vaping status: Never Used   Substance Use Topics    Alcohol use: Yes     Comment: socially    Drug use: No           11/25/2024   LAST FHS-7 RESULTS   1st degree relative breast or ovarian cancer No   Any relative bilateral breast cancer No   Any male have breast cancer No   Any ONE woman have BOTH breast AND ovarian cancer No   Any woman with breast cancer before 50yrs No   2 or more relatives with breast AND/OR ovarian cancer No   2 or more relatives with breast AND/OR bowel cancer No        Mammogram Screening - Mammogram every 1-2 years updated in Health Maintenance based on mutual decision making      History of abnormal Pap smear: No - age 65 or older with adequate negative prior screening test results (3 consecutive negative cytology results, 2 consecutive negative cotesting results, or 2 consecutive negative HrHPV test results within 10 years, with the most recent test occurring within the recommended screening interval for the test used)        Latest Ref Rng & Units 3/12/2018     1:38 PM 11/25/2014    12:00 AM   PAP / HPV   PAP (Historical)  NIL  NIL    HPV 16 DNA NEG^Negative Negative     HPV 18 DNA NEG^Negative Negative     Other HR HPV NEG^Negative Negative       ASCVD Risk   The 10-year ASCVD risk score (Billy LANIER, et al., 2019) is: 5.9%    Values used to calculate the score:      Age: 69 years      Sex: Female      Is Non- : No      Diabetic: No      Tobacco smoker: No      Systolic Blood Pressure: 120 mmHg      Is BP treated: No      HDL Cholesterol: 87 mg/dL      Total Cholesterol: 264 mg/dL            Reviewed and updated as needed this visit by Provider                    Past Medical History:   Diagnosis Date    Glaucoma     Menopausal or female climacteric states 08/21/2000    Routine general medical examination at  [employer] [facility] ca 08/18/2000     Past Surgical History:   Procedure Laterality  Date    COLONOSCOPY  3-    COLONOSCOPY N/A 5/15/2017    Procedure: COLONOSCOPY;  COLONOSCOPY WITH BIOPSIES;  Surgeon: David Vieira MD;  Location: [facility]    excision of ganglion cyst      phlebectomies x11 R leg,x12 L leg      Bilateral, varicose veins     BP Readings from Last 3 Encounters:   01/06/25 120/74   08/29/24 (!) 145/83   05/11/24 138/86    Wt Readings from Last 3 Encounters:   01/06/25 73.5 kg (162 lb)   08/29/24 70.3 kg (155 lb)   05/11/24 72.6 kg (160 lb)                  Patient Active Problem List   Diagnosis    ACP (advance care planning)    Other specified glaucoma    Irritable bowel syndrome with both constipation and diarrhea    Atrophic vaginitis    High-frequency hearing loss of right ear    Acquired hypothyroidism    High cholesterol     Past Surgical History:   Procedure Laterality Date    COLONOSCOPY  3-    COLONOSCOPY N/A 5/15/2017    Procedure: COLONOSCOPY;  COLONOSCOPY WITH BIOPSIES;  Surgeon: David Vieira MD;  Location: [facility]    excision of ganglion cyst      phlebectomies x11 R leg,x12 L leg      Bilateral, varicose veins       Social History     Tobacco Use    Smoking status: Never     Passive exposure: Never    Smokeless tobacco: Never    Tobacco comments:     no passive exposure   Substance Use Topics    Alcohol use: Yes     Comment: socially     Family History   Problem Relation Age of Onset    Cancer Father     Other - See Comments Father 82        Emphysema, cause of death    Arthritis Mother 62        Rheumatoid Arthritis, cause of death    Other - See Comments Sister         Stomach aneurysm         Current Outpatient Medications   Medication Sig Dispense Refill    amitriptyline (ELAVIL) 50 MG tablet Take 1 tablet by mouth daily at 2 pm      budesonide (PULMICORT) 0.5 MG/2ML neb solution MAKE 240 ML LUX MED SINUS IRRIGATION MIX 2 ML VIAL OFBUDESONIDE 0.5MG AND RINSE TWICE DAILY 120 mL 4    estradiol (VAGIFEM) 10 MCG TABS vaginal tablet Place 1 tablet (10  mcg) vaginally twice a week 24 tablet 3    fluticasone (FLONASE) 50 MCG/ACT nasal spray Spray 2 sprays into both nostrils daily. 16 g 11    fluticasone (FLONASE) 50 MCG/ACT nasal spray Spray 2 sprays into both nostrils daily 16 g 11    fluticasone (FLONASE) 50 MCG/ACT nasal spray Spray 2 sprays into both nostrils daily 16 g 11    levothyroxine (SYNTHROID/LEVOTHROID) 25 MCG tablet TAKE 1 TABLET (25 MCG) BY MOUTH DAILY 90 tablet 3    travoprost MICHELLE FREE (TRAVATAN Z) 0.004 % ophthalmic solution Apply 1 drop to eye daily       Allergies   Allergen Reactions    Amoxicillin-Pot Clavulanate Cramps     Recent Labs   Lab Test 01/08/24  1313 03/31/23  1151 12/09/22  0925 09/17/21  0840 09/17/21  0840 04/06/21  1551 08/20/20  0800 01/13/20  1457 03/28/19  0839   *  --  102*  --  133*  --  140*  --  127*   HDL 87  --  76  --  76  --  70  --  81   TRIG 120  --  315*  --  132  --  183*  --  150*   ALT 24  --  27  --  27  --  30  --  30   CR 0.65  --  0.71  --  0.73   < > 0.70  --  0.69   GFRESTIMATED >90  --  >90  --  87   < > >90  --  >90   GFRESTBLACK  --   --   --   --   --   --  >90  --  >90   POTASSIUM 3.6  --  4.2  --  3.8  --  4.1  --  4.3   TSH 2.81 3.40 5.17*   < >  --    < >  --    < >  --     < > = values in this interval not displayed.      Current providers sharing in care for this patient include:  Patient Care Team:  Lazara Tate PA as PCP - General  Lazara Tate PA as Assigned PCP  Rocio Tuttle PA-C as Assigned Surgical Provider    The following health maintenance items are reviewed in Epic and correct as of today:  Health Maintenance   Topic Date Due    DEXA  09/22/2023    INFLUENZA VACCINE (1) 09/01/2024    COVID-19 Vaccine (7 - 2024-25 season) 09/01/2024    MEDICARE ANNUAL WELLNESS VISIT  01/05/2025    LIPID  01/08/2025    TSH W/FREE T4 REFLEX  01/08/2025    FALL RISK ASSESSMENT  01/06/2026    MAMMO SCREENING  11/25/2026    GLUCOSE  01/08/2027    COLORECTAL CANCER SCREENING  05/15/2027     "ADVANCE CARE PLANNING  05/11/2029    DTAP/TDAP/TD IMMUNIZATION (11 - Td or Tdap) 09/15/2031    RSV VACCINE (1 - 1-dose 75+ series) 03/13/2032    HEPATITIS C SCREENING  Completed    PHQ-2 (once per calendar year)  Completed    Pneumococcal Vaccine: 50+ Years  Completed    ZOSTER IMMUNIZATION  Completed    HPV IMMUNIZATION  Aged Out    MENINGITIS IMMUNIZATION  Aged Out    RSV MONOCLONAL ANTIBODY  Aged Out    PAP  Discontinued         Review of Systems  Constitutional, neuro, ENT, endocrine, pulmonary, cardiac, gastrointestinal, genitourinary, musculoskeletal, integument and psychiatric systems are negative, except as otherwise noted.     Objective    Exam  /74 (BP Location: Left arm, Patient Position: Sitting, Cuff Size: Adult Regular)   Pulse 83   Temp 98.1  F (36.7  C) (Tympanic)   Resp 16   Ht 1.588 m (5' 2.5\")   Wt 73.5 kg (162 lb)   LMP 05/11/2004 (Approximate)   SpO2 98%   BMI 29.16 kg/m     Estimated body mass index is 29.16 kg/m  as calculated from the following:    Height as of this encounter: 1.588 m (5' 2.5\").    Weight as of this encounter: 73.5 kg (162 lb).    Physical Exam  GENERAL: alert and no distress  EYES: Eyes grossly normal to inspection, PERRL and conjunctivae and sclerae normal  HENT: ear canals and TM's normal, nose and mouth without ulcers or lesions  NECK: no adenopathy, no asymmetry, masses, or scars  RESP: lungs clear to auscultation - no rales, rhonchi or wheezes  BREAST: normal without masses, tenderness or nipple discharge and no palpable axillary masses or adenopathy  CV: regular rate and rhythm, normal S1 S2, no S3 or S4, no murmur, click or rub, no peripheral edema  ABDOMEN: soft, nontender, no hepatosplenomegaly, no masses and bowel sounds normal  MS: no gross musculoskeletal defects noted, no edema  SKIN: no suspicious lesions or rashes  NEURO: Normal strength and tone, mentation intact and speech normal  PSYCH: mentation appears normal, affect normal/bright  LYMPH: no " cervical, supraclavicular, axillary, or inguinal adenopathy        1/6/2025   Mini Cog   Clock Draw Score 2 Normal   3 Item Recall 2 objects recalled   Mini Cog Total Score 4              Signed Electronically by: Lazara Tate PA

## 2025-01-08 ENCOUNTER — HOSPITAL ENCOUNTER (OUTPATIENT)
Dept: BONE DENSITY | Facility: HOSPITAL | Age: 70
Discharge: HOME | End: 2025-01-08
Attending: PHYSICIAN ASSISTANT
Payer: COMMERCIAL

## 2025-01-08 DIAGNOSIS — Z78.0 ASYMPTOMATIC POSTMENOPAUSAL ESTROGEN DEFICIENCY: ICD-10-CM

## 2025-01-08 PROCEDURE — 77080 DXA BONE DENSITY AXIAL: CPT

## 2025-01-09 ENCOUNTER — LAB (OUTPATIENT)
Dept: LAB | Facility: OTHER | Age: 70
End: 2025-01-09
Payer: COMMERCIAL

## 2025-01-09 DIAGNOSIS — K58.2 IRRITABLE BOWEL SYNDROME WITH BOTH CONSTIPATION AND DIARRHEA: ICD-10-CM

## 2025-01-09 DIAGNOSIS — E78.00 HIGH CHOLESTEROL: ICD-10-CM

## 2025-01-09 DIAGNOSIS — E03.9 ACQUIRED HYPOTHYROIDISM: ICD-10-CM

## 2025-01-09 LAB
ALBUMIN SERPL BCG-MCNC: 4.1 G/DL (ref 3.5–5.2)
ALBUMIN UR-MCNC: NEGATIVE MG/DL
ALP SERPL-CCNC: 69 U/L (ref 40–150)
ALT SERPL W P-5'-P-CCNC: 20 U/L (ref 0–50)
ANION GAP SERPL CALCULATED.3IONS-SCNC: 12 MMOL/L (ref 7–15)
APPEARANCE UR: CLEAR
AST SERPL W P-5'-P-CCNC: 20 U/L (ref 0–45)
BASOPHILS # BLD AUTO: 0.1 10E3/UL (ref 0–0.2)
BASOPHILS NFR BLD AUTO: 1 %
BILIRUB SERPL-MCNC: 0.5 MG/DL
BILIRUB UR QL STRIP: NEGATIVE
BUN SERPL-MCNC: 9.6 MG/DL (ref 8–23)
CALCIUM SERPL-MCNC: 9 MG/DL (ref 8.8–10.4)
CHLORIDE SERPL-SCNC: 101 MMOL/L (ref 98–107)
CHOLEST SERPL-MCNC: 233 MG/DL
COLOR UR AUTO: NORMAL
CREAT SERPL-MCNC: 0.69 MG/DL (ref 0.51–0.95)
EGFRCR SERPLBLD CKD-EPI 2021: >90 ML/MIN/1.73M2
EOSINOPHIL # BLD AUTO: 0.2 10E3/UL (ref 0–0.7)
EOSINOPHIL NFR BLD AUTO: 3 %
ERYTHROCYTE [DISTWIDTH] IN BLOOD BY AUTOMATED COUNT: 13 % (ref 10–15)
FASTING STATUS PATIENT QL REPORTED: YES
FASTING STATUS PATIENT QL REPORTED: YES
GLUCOSE SERPL-MCNC: 92 MG/DL (ref 70–99)
GLUCOSE UR STRIP-MCNC: NEGATIVE MG/DL
HCO3 SERPL-SCNC: 23 MMOL/L (ref 22–29)
HCT VFR BLD AUTO: 38.1 % (ref 35–47)
HDLC SERPL-MCNC: 74 MG/DL
HGB BLD-MCNC: 12.6 G/DL (ref 11.7–15.7)
HGB UR QL STRIP: NEGATIVE
IMM GRANULOCYTES # BLD: 0 10E3/UL
IMM GRANULOCYTES NFR BLD: 0 %
KETONES UR STRIP-MCNC: NEGATIVE MG/DL
LDLC SERPL CALC-MCNC: 139 MG/DL
LEUKOCYTE ESTERASE UR QL STRIP: NEGATIVE
LYMPHOCYTES # BLD AUTO: 3.1 10E3/UL (ref 0.8–5.3)
LYMPHOCYTES NFR BLD AUTO: 49 %
MCH RBC QN AUTO: 30.4 PG (ref 26.5–33)
MCHC RBC AUTO-ENTMCNC: 33.1 G/DL (ref 31.5–36.5)
MCV RBC AUTO: 92 FL (ref 78–100)
MONOCYTES # BLD AUTO: 0.5 10E3/UL (ref 0–1.3)
MONOCYTES NFR BLD AUTO: 8 %
NEUTROPHILS # BLD AUTO: 2.6 10E3/UL (ref 1.6–8.3)
NEUTROPHILS NFR BLD AUTO: 40 %
NITRATE UR QL: NEGATIVE
NONHDLC SERPL-MCNC: 159 MG/DL
NRBC # BLD AUTO: 0 10E3/UL
NRBC BLD AUTO-RTO: 0 /100
PH UR STRIP: 7 [PH] (ref 4.7–8)
PLATELET # BLD AUTO: 253 10E3/UL (ref 150–450)
POTASSIUM SERPL-SCNC: 4.1 MMOL/L (ref 3.4–5.3)
PROT SERPL-MCNC: 6.9 G/DL (ref 6.4–8.3)
RBC # BLD AUTO: 4.14 10E6/UL (ref 3.8–5.2)
RBC URINE: 0 /HPF
SODIUM SERPL-SCNC: 136 MMOL/L (ref 135–145)
SP GR UR STRIP: 1 (ref 1–1.03)
SQUAMOUS EPITHELIAL: 0 /HPF
TRIGL SERPL-MCNC: 98 MG/DL
TSH SERPL DL<=0.005 MIU/L-ACNC: 3.52 UIU/ML (ref 0.3–4.2)
UROBILINOGEN UR STRIP-MCNC: NORMAL MG/DL
WBC # BLD AUTO: 6.5 10E3/UL (ref 4–11)
WBC URINE: 0 /HPF

## 2025-01-09 PROCEDURE — 85014 HEMATOCRIT: CPT | Mod: ZL

## 2025-01-09 PROCEDURE — 36415 COLL VENOUS BLD VENIPUNCTURE: CPT | Mod: ZL

## 2025-01-09 PROCEDURE — 82435 ASSAY OF BLOOD CHLORIDE: CPT | Mod: ZL

## 2025-01-09 PROCEDURE — 82465 ASSAY BLD/SERUM CHOLESTEROL: CPT | Mod: ZL

## 2025-01-09 PROCEDURE — 84460 ALANINE AMINO (ALT) (SGPT): CPT | Mod: ZL

## 2025-01-09 PROCEDURE — 84443 ASSAY THYROID STIM HORMONE: CPT | Mod: ZL

## 2025-01-09 PROCEDURE — 82947 ASSAY GLUCOSE BLOOD QUANT: CPT | Mod: ZL

## 2025-01-09 PROCEDURE — 81003 URINALYSIS AUTO W/O SCOPE: CPT | Mod: ZL

## 2025-01-09 PROCEDURE — 80061 LIPID PANEL: CPT | Mod: ZL

## 2025-01-09 PROCEDURE — 85004 AUTOMATED DIFF WBC COUNT: CPT | Mod: ZL

## 2025-01-09 NOTE — RESULT ENCOUNTER NOTE
Your bone density is improved on your spine and stable in your hip.  Good job , keep as active as you are!

## 2025-02-24 DIAGNOSIS — H91.93 DECREASED HEARING OF BOTH EARS: Primary | ICD-10-CM

## 2025-05-06 ENCOUNTER — TELEPHONE (OUTPATIENT)
Dept: OTOLARYNGOLOGY | Facility: OTHER | Age: 70
End: 2025-05-06

## 2025-06-23 DIAGNOSIS — E03.9 ACQUIRED HYPOTHYROIDISM: ICD-10-CM

## 2025-06-23 RX ORDER — LEVOTHYROXINE SODIUM 25 UG/1
25 TABLET ORAL DAILY
Qty: 90 TABLET | Refills: 0 | Status: SHIPPED | OUTPATIENT
Start: 2025-06-23

## 2025-06-23 NOTE — PROGRESS NOTES
Levothyroxine  Last signed 6/24  Last office visit 1/6 EZEQUIEL Marie and has upcoming est care with Dr. Paulson 7/14  TSH   Date Value Ref Range Status   01/09/2025 3.52 0.30 - 4.20 uIU/mL Final   06/23/2021 2.45 0.40 - 4.00 mU/L Final     Cat Horvath, RN  Care Coordination

## 2025-07-11 NOTE — PROGRESS NOTES
Assessment & Plan     Encounter to establish care  followed with Lazara Tate with last visit 1/6/2025 for preventive cares    Acquired hypothyroidism  TSH (1/2025) 3.52  - c/w levothyroxine 25mcg    High cholesterol  ASCVD 7.1%  Currently not on lipid medication    Irritable bowel syndrome with both constipation and diarrhea  Follows with JORGE Rosario Ashley with last visit 6/18/2025. Note reviewed. Follow up in 6 months  colonoscopy in 05/2017 which revealed diverticulosis scattered throughout the colon and was otherwise normal   Bx normal  - c/w amitriptyline     Chronic pain of left knee  Rare use of ibuprofen   Okay for trial of voltaren gel  Ice to help with swelling  Will hold on imaging d/t not affecting daily life     The longitudinal plan of care for the diagnosis(es)/condition(s) as documented were addressed during this visit. Due to the added complexity in care, I will continue to support Lanye in the subsequent management and with ongoing continuity of care.    Follow-up  Return in about 6 months (around 1/14/2026) for Physical Exam, Lab Work.    Kayla Davison is a 70 year old, presenting for the following health issues:  Lipids and Thyroid Problem        7/14/2025     2:35 PM   Additional Questions   Roomed by Renuka Ansari     History of Present Illness       Reason for visit:  NA  Symptoms include:  NA  What makes it worse:  NA  What makes it better:  NA   She is taking medications regularly.        Est care: followed with Lazara Tate with last visit 1/6/2025 for preventive cares      Hyperlipidemia Follow-Up    Are you regularly taking any medication or supplement to lower your cholesterol?   No  Are you having muscle aches or other side effects that you think could be caused by your cholesterol lowering medication?  No    - no h/o cholesterol medications     The 10-year ASCVD risk score (Billy LANIER, et al., 2019) is: 7.1%    Values used to calculate the score:      Age: 70 years       "Sex: Female      Is Non- : No      Diabetic: No      Tobacco smoker: No      Systolic Blood Pressure: 124 mmHg      Is BP treated: No      HDL Cholesterol: 74 mg/dL      Total Cholesterol: 233 mg/dL      Hypothyroidism Follow-up    Since last visit, patient describes the following symptoms: Weight stable, no hair loss, no skin changes, no constipation, no loose stools  - no issues     Dexa (1/8/2025) normal    # Left knee   - April 5k and has had issues since   - MRI left knee (2014) degenerative changes  - takes an advil rarely    # IBS  - for GI issues   - uses amitriptyline   - every 5 to 6 days will have diarrhea  Follows with [facility] (6/18/2025)       # social  - [place]   - 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      Objective    /76 (BP Location: Right arm, Patient Position: Sitting, Cuff Size: Adult Regular)   Pulse 93   Temp 98.3  F (36.8  C) (Tympanic)   Resp 16   Ht 1.588 m (5' 2.5\")   Wt 72.1 kg (159 lb)   LMP 05/11/2004 (Approximate)   SpO2 97%   BMI 28.62 kg/m    Body mass index is 28.62 kg/m .  Physical Exam  Constitutional:       General: She is not in acute distress.     Appearance: She is not ill-appearing.   Cardiovascular:      Rate and Rhythm: Normal rate and regular rhythm.      Heart sounds: No murmur heard.  Pulmonary:      Effort: Pulmonary effort is normal. No respiratory distress.      Breath sounds: No wheezing or rales.   Musculoskeletal:      Comments: Left knee: large effusion. Full ROM. Able to ambulate w/o issues    Neurological:      Mental Status: She is alert.            Recent Labs   Lab Test 01/09/25  0746 01/08/24  1313   CHOL 233* 264*   HDL 74 87   * 153*   TRIG " 98 120               The 10-year ASCVD risk score (Billy LANIER, et al., 2019) is: 7.1%    Values used to calculate the score:      Age: 70 years      Sex: Female      Is Non- : No      Diabetic: No      Tobacco smoker: No      Systolic Blood Pressure: 124 mmHg      Is BP treated: No      HDL Cholesterol: 74 mg/dL      Total Cholesterol: 233 mg/dL      Signed Electronically by: Francy Paulson MD

## 2025-07-14 ENCOUNTER — OFFICE VISIT (OUTPATIENT)
Dept: FAMILY MEDICINE | Facility: OTHER | Age: 70
End: 2025-07-14
Attending: FAMILY MEDICINE
Payer: COMMERCIAL

## 2025-07-14 VITALS
TEMPERATURE: 98.3 F | RESPIRATION RATE: 16 BRPM | DIASTOLIC BLOOD PRESSURE: 76 MMHG | WEIGHT: 159 LBS | BODY MASS INDEX: 28.17 KG/M2 | HEIGHT: 63 IN | OXYGEN SATURATION: 97 % | HEART RATE: 93 BPM | SYSTOLIC BLOOD PRESSURE: 124 MMHG

## 2025-07-14 DIAGNOSIS — E03.9 ACQUIRED HYPOTHYROIDISM: ICD-10-CM

## 2025-07-14 DIAGNOSIS — E78.00 HIGH CHOLESTEROL: ICD-10-CM

## 2025-07-14 DIAGNOSIS — Z76.89 ENCOUNTER TO ESTABLISH CARE: Primary | ICD-10-CM

## 2025-07-14 DIAGNOSIS — G89.29 CHRONIC PAIN OF LEFT KNEE: ICD-10-CM

## 2025-07-14 DIAGNOSIS — M25.562 CHRONIC PAIN OF LEFT KNEE: ICD-10-CM

## 2025-07-14 DIAGNOSIS — K58.2 IRRITABLE BOWEL SYNDROME WITH BOTH CONSTIPATION AND DIARRHEA: ICD-10-CM

## 2025-07-14 PROCEDURE — G0463 HOSPITAL OUTPT CLINIC VISIT: HCPCS | Mod: 25

## 2025-07-14 PROCEDURE — G0463 HOSPITAL OUTPT CLINIC VISIT: HCPCS

## 2025-07-14 ASSESSMENT — PAIN SCALES - GENERAL: PAINLEVEL_OUTOF10: MODERATE PAIN (4)

## (undated) DEVICE — IRRIGATION-H2O 1000ML

## (undated) DEVICE — TUBING-SUCTION 20FT

## (undated) DEVICE — FORCEP-COLON BIOPSY LARGE W/NEEDLE 240CM

## (undated) DEVICE — LUBRICANT JELLY 2OZ. TUBE

## (undated) RX ORDER — PROPOFOL 10 MG/ML
INJECTION, EMULSION INTRAVENOUS
Status: DISPENSED
Start: 2017-05-15

## (undated) RX ORDER — GINSENG 100 MG
CAPSULE ORAL
Status: DISPENSED
Start: 2024-01-05

## (undated) RX ORDER — FENTANYL CITRATE 50 UG/ML
INJECTION, SOLUTION INTRAMUSCULAR; INTRAVENOUS
Status: DISPENSED
Start: 2017-05-15